# Patient Record
Sex: FEMALE | Race: WHITE | Employment: OTHER | URBAN - METROPOLITAN AREA
[De-identification: names, ages, dates, MRNs, and addresses within clinical notes are randomized per-mention and may not be internally consistent; named-entity substitution may affect disease eponyms.]

---

## 2018-03-15 ENCOUNTER — PROCEDURE VISIT (OUTPATIENT)
Dept: AUDIOLOGY | Age: 73
End: 2018-03-15

## 2018-03-15 DIAGNOSIS — H90.3 SENSORINEURAL HEARING LOSS, BILATERAL: Primary | ICD-10-CM

## 2018-03-15 PROCEDURE — 99024 POSTOP FOLLOW-UP VISIT: CPT | Performed by: AUDIOLOGIST

## 2018-03-15 NOTE — PROGRESS NOTES
Patient seen for hearing aid fitting. Patient was instructed in the use and care of her hearing aids. She was also instructed in the cleaning of the hearing aids. She will return in one week for a hearing aid check.

## 2018-03-23 ENCOUNTER — PROCEDURE VISIT (OUTPATIENT)
Dept: AUDIOLOGY | Age: 73
End: 2018-03-23

## 2018-03-23 DIAGNOSIS — H90.3 SENSORINEURAL HEARING LOSS, BILATERAL: Primary | ICD-10-CM

## 2018-03-23 PROCEDURE — 99024 POSTOP FOLLOW-UP VISIT: CPT | Performed by: AUDIOLOGIST

## 2018-04-26 ENCOUNTER — PROCEDURE VISIT (OUTPATIENT)
Dept: AUDIOLOGY | Age: 73
End: 2018-04-26

## 2018-04-26 DIAGNOSIS — H90.3 SENSORINEURAL HEARING LOSS, BILATERAL: Primary | ICD-10-CM

## 2018-04-26 PROCEDURE — 99024 POSTOP FOLLOW-UP VISIT: CPT | Performed by: AUDIOLOGIST

## 2018-05-01 ENCOUNTER — PROCEDURE VISIT (OUTPATIENT)
Dept: AUDIOLOGY | Age: 73
End: 2018-05-01

## 2018-05-01 DIAGNOSIS — H90.3 SENSORINEURAL HEARING LOSS, BILATERAL: Primary | ICD-10-CM

## 2018-05-01 PROCEDURE — 99024 POSTOP FOLLOW-UP VISIT: CPT | Performed by: AUDIOLOGIST

## 2020-08-13 ENCOUNTER — PROCEDURE VISIT (OUTPATIENT)
Dept: AUDIOLOGY | Age: 75
End: 2020-08-13

## 2020-08-13 PROCEDURE — 99999 PR OFFICE/OUTPT VISIT,PROCEDURE ONLY: CPT | Performed by: AUDIOLOGIST

## 2022-11-02 ENCOUNTER — APPOINTMENT (OUTPATIENT)
Dept: ULTRASOUND IMAGING | Age: 77
DRG: 177 | End: 2022-11-02
Payer: COMMERCIAL

## 2022-11-02 ENCOUNTER — APPOINTMENT (OUTPATIENT)
Dept: GENERAL RADIOLOGY | Age: 77
DRG: 177 | End: 2022-11-02
Payer: COMMERCIAL

## 2022-11-02 ENCOUNTER — HOSPITAL ENCOUNTER (INPATIENT)
Age: 77
LOS: 8 days | Discharge: NURSING, REHAB OR LONG-TERM CARE | DRG: 177 | End: 2022-11-10
Attending: EMERGENCY MEDICINE | Admitting: STUDENT IN AN ORGANIZED HEALTH CARE EDUCATION/TRAINING PROGRAM
Payer: COMMERCIAL

## 2022-11-02 ENCOUNTER — APPOINTMENT (OUTPATIENT)
Dept: CT IMAGING | Age: 77
DRG: 177 | End: 2022-11-02
Payer: COMMERCIAL

## 2022-11-02 DIAGNOSIS — J98.4 CAVITARY LESION OF LUNG: ICD-10-CM

## 2022-11-02 DIAGNOSIS — U07.1 COVID: ICD-10-CM

## 2022-11-02 DIAGNOSIS — R19.00 PELVIC MASS: ICD-10-CM

## 2022-11-02 DIAGNOSIS — U07.1 COVID-19 VIRUS INFECTION: ICD-10-CM

## 2022-11-02 DIAGNOSIS — J96.01 ACUTE RESPIRATORY FAILURE WITH HYPOXIA (HCC): Primary | ICD-10-CM

## 2022-11-02 PROBLEM — J96.00 ACUTE RESPIRATORY FAILURE (HCC): Status: ACTIVE | Noted: 2022-11-02

## 2022-11-02 LAB
ALBUMIN SERPL-MCNC: 3.2 G/DL (ref 3.5–5.2)
ALP BLD-CCNC: 52 U/L (ref 35–104)
ALT SERPL-CCNC: 26 U/L (ref 0–32)
ANION GAP SERPL CALCULATED.3IONS-SCNC: 12 MMOL/L (ref 7–16)
AST SERPL-CCNC: 69 U/L (ref 0–31)
BACTERIA: ABNORMAL /HPF
BASOPHILS ABSOLUTE: 0 E9/L (ref 0–0.2)
BASOPHILS RELATIVE PERCENT: 0.2 % (ref 0–2)
BILIRUB SERPL-MCNC: 0.5 MG/DL (ref 0–1.2)
BILIRUBIN URINE: NEGATIVE
BLOOD, URINE: ABNORMAL
BUN BLDV-MCNC: 21 MG/DL (ref 6–23)
BURR CELLS: ABNORMAL
C-REACTIVE PROTEIN: 10.8 MG/DL (ref 0–0.4)
CALCIUM SERPL-MCNC: 8 MG/DL (ref 8.6–10.2)
CHLORIDE BLD-SCNC: 99 MMOL/L (ref 98–107)
CLARITY: CLEAR
CO2: 21 MMOL/L (ref 22–29)
COLOR: YELLOW
CREAT SERPL-MCNC: 0.7 MG/DL (ref 0.5–1)
EKG ATRIAL RATE: 84 BPM
EKG P AXIS: 74 DEGREES
EKG P-R INTERVAL: 146 MS
EKG Q-T INTERVAL: 372 MS
EKG QRS DURATION: 94 MS
EKG QTC CALCULATION (BAZETT): 439 MS
EKG R AXIS: 73 DEGREES
EKG T AXIS: 65 DEGREES
EKG VENTRICULAR RATE: 84 BPM
EOSINOPHILS ABSOLUTE: 0 E9/L (ref 0.05–0.5)
EOSINOPHILS RELATIVE PERCENT: 0 % (ref 0–6)
GFR SERPL CREATININE-BSD FRML MDRD: >60 ML/MIN/1.73
GLUCOSE BLD-MCNC: 126 MG/DL (ref 74–99)
GLUCOSE URINE: NEGATIVE MG/DL
HCT VFR BLD CALC: 45.7 % (ref 34–48)
HEMOGLOBIN: 15.6 G/DL (ref 11.5–15.5)
INFLUENZA A BY PCR: NOT DETECTED
INFLUENZA B BY PCR: NOT DETECTED
KETONES, URINE: 15 MG/DL
LEUKOCYTE ESTERASE, URINE: ABNORMAL
LYMPHOCYTES ABSOLUTE: 0.29 E9/L (ref 1.5–4)
LYMPHOCYTES RELATIVE PERCENT: 2.6 % (ref 20–42)
MCH RBC QN AUTO: 29.7 PG (ref 26–35)
MCHC RBC AUTO-ENTMCNC: 34.1 % (ref 32–34.5)
MCV RBC AUTO: 87 FL (ref 80–99.9)
MONOCYTES ABSOLUTE: 0.49 E9/L (ref 0.1–0.95)
MONOCYTES RELATIVE PERCENT: 5.2 % (ref 2–12)
NEUTROPHILS ABSOLUTE: 9.02 E9/L (ref 1.8–7.3)
NEUTROPHILS RELATIVE PERCENT: 92.2 % (ref 43–80)
NITRITE, URINE: NEGATIVE
OVALOCYTES: ABNORMAL
PDW BLD-RTO: 14.8 FL (ref 11.5–15)
PH UA: 6 (ref 5–9)
PLATELET # BLD: 111 E9/L (ref 130–450)
PMV BLD AUTO: 11.5 FL (ref 7–12)
POIKILOCYTES: ABNORMAL
POTASSIUM REFLEX MAGNESIUM: 3.8 MMOL/L (ref 3.5–5)
PRO-BNP: 685 PG/ML (ref 0–450)
PROCALCITONIN: 0.2 NG/ML (ref 0–0.08)
PROTEIN UA: 30 MG/DL
RBC # BLD: 5.25 E12/L (ref 3.5–5.5)
RBC UA: ABNORMAL /HPF (ref 0–2)
REASON FOR REJECTION: NORMAL
REJECTED TEST: NORMAL
SARS-COV-2, NAAT: DETECTED
SEDIMENTATION RATE, ERYTHROCYTE: 6 MM/HR (ref 0–20)
SODIUM BLD-SCNC: 132 MMOL/L (ref 132–146)
SPECIFIC GRAVITY UA: >=1.03 (ref 1–1.03)
TOTAL PROTEIN: 6 G/DL (ref 6.4–8.3)
TROPONIN, HIGH SENSITIVITY: 13 NG/L (ref 0–9)
TROPONIN, HIGH SENSITIVITY: 14 NG/L (ref 0–9)
UROBILINOGEN, URINE: 1 E.U./DL
WBC # BLD: 9.8 E9/L (ref 4.5–11.5)
WBC UA: ABNORMAL /HPF (ref 0–5)

## 2022-11-02 PROCEDURE — 6370000000 HC RX 637 (ALT 250 FOR IP): Performed by: STUDENT IN AN ORGANIZED HEALTH CARE EDUCATION/TRAINING PROGRAM

## 2022-11-02 PROCEDURE — 87635 SARS-COV-2 COVID-19 AMP PRB: CPT

## 2022-11-02 PROCEDURE — 6360000002 HC RX W HCPCS: Performed by: STUDENT IN AN ORGANIZED HEALTH CARE EDUCATION/TRAINING PROGRAM

## 2022-11-02 PROCEDURE — 76856 US EXAM PELVIC COMPLETE: CPT

## 2022-11-02 PROCEDURE — 85025 COMPLETE CBC W/AUTO DIFF WBC: CPT

## 2022-11-02 PROCEDURE — 70450 CT HEAD/BRAIN W/O DYE: CPT

## 2022-11-02 PROCEDURE — 6360000004 HC RX CONTRAST MEDICATION: Performed by: RADIOLOGY

## 2022-11-02 PROCEDURE — 99285 EMERGENCY DEPT VISIT HI MDM: CPT

## 2022-11-02 PROCEDURE — 84145 PROCALCITONIN (PCT): CPT

## 2022-11-02 PROCEDURE — 2140000000 HC CCU INTERMEDIATE R&B

## 2022-11-02 PROCEDURE — 81001 URINALYSIS AUTO W/SCOPE: CPT

## 2022-11-02 PROCEDURE — 71275 CT ANGIOGRAPHY CHEST: CPT

## 2022-11-02 PROCEDURE — 74177 CT ABD & PELVIS W/CONTRAST: CPT

## 2022-11-02 PROCEDURE — 87502 INFLUENZA DNA AMP PROBE: CPT

## 2022-11-02 PROCEDURE — 73090 X-RAY EXAM OF FOREARM: CPT

## 2022-11-02 PROCEDURE — 90471 IMMUNIZATION ADMIN: CPT | Performed by: STUDENT IN AN ORGANIZED HEALTH CARE EDUCATION/TRAINING PROGRAM

## 2022-11-02 PROCEDURE — 71045 X-RAY EXAM CHEST 1 VIEW: CPT

## 2022-11-02 PROCEDURE — 72170 X-RAY EXAM OF PELVIS: CPT

## 2022-11-02 PROCEDURE — 72125 CT NECK SPINE W/O DYE: CPT

## 2022-11-02 PROCEDURE — 90714 TD VACC NO PRESV 7 YRS+ IM: CPT | Performed by: STUDENT IN AN ORGANIZED HEALTH CARE EDUCATION/TRAINING PROGRAM

## 2022-11-02 PROCEDURE — 2580000003 HC RX 258: Performed by: RADIOLOGY

## 2022-11-02 PROCEDURE — 93010 ELECTROCARDIOGRAM REPORT: CPT | Performed by: INTERNAL MEDICINE

## 2022-11-02 PROCEDURE — 96372 THER/PROPH/DIAG INJ SC/IM: CPT

## 2022-11-02 PROCEDURE — 84484 ASSAY OF TROPONIN QUANT: CPT

## 2022-11-02 PROCEDURE — 83880 ASSAY OF NATRIURETIC PEPTIDE: CPT

## 2022-11-02 PROCEDURE — 80053 COMPREHEN METABOLIC PANEL: CPT

## 2022-11-02 PROCEDURE — 93005 ELECTROCARDIOGRAM TRACING: CPT | Performed by: STUDENT IN AN ORGANIZED HEALTH CARE EDUCATION/TRAINING PROGRAM

## 2022-11-02 PROCEDURE — 94664 DEMO&/EVAL PT USE INHALER: CPT

## 2022-11-02 PROCEDURE — 85651 RBC SED RATE NONAUTOMATED: CPT

## 2022-11-02 PROCEDURE — 86140 C-REACTIVE PROTEIN: CPT

## 2022-11-02 RX ORDER — BUDESONIDE 0.25 MG/2ML
250 INHALANT ORAL 2 TIMES DAILY
Status: DISCONTINUED | OUTPATIENT
Start: 2022-11-02 | End: 2022-11-10 | Stop reason: HOSPADM

## 2022-11-02 RX ORDER — ONDANSETRON 2 MG/ML
4 INJECTION INTRAMUSCULAR; INTRAVENOUS EVERY 6 HOURS PRN
Status: DISCONTINUED | OUTPATIENT
Start: 2022-11-02 | End: 2022-11-10 | Stop reason: HOSPADM

## 2022-11-02 RX ORDER — IPRATROPIUM BROMIDE AND ALBUTEROL SULFATE 2.5; .5 MG/3ML; MG/3ML
3 SOLUTION RESPIRATORY (INHALATION) ONCE
Status: COMPLETED | OUTPATIENT
Start: 2022-11-02 | End: 2022-11-02

## 2022-11-02 RX ORDER — ENOXAPARIN SODIUM 100 MG/ML
40 INJECTION SUBCUTANEOUS 2 TIMES DAILY
Status: DISCONTINUED | OUTPATIENT
Start: 2022-11-02 | End: 2022-11-10 | Stop reason: HOSPADM

## 2022-11-02 RX ORDER — ARFORMOTEROL TARTRATE 15 UG/2ML
15 SOLUTION RESPIRATORY (INHALATION) 2 TIMES DAILY
Status: DISCONTINUED | OUTPATIENT
Start: 2022-11-02 | End: 2022-11-10 | Stop reason: HOSPADM

## 2022-11-02 RX ORDER — GUAIFENESIN/DEXTROMETHORPHAN 100-10MG/5
5 SYRUP ORAL EVERY 4 HOURS PRN
Status: DISCONTINUED | OUTPATIENT
Start: 2022-11-02 | End: 2022-11-10 | Stop reason: HOSPADM

## 2022-11-02 RX ORDER — SODIUM CHLORIDE 0.9 % (FLUSH) 0.9 %
5-40 SYRINGE (ML) INJECTION EVERY 12 HOURS SCHEDULED
Status: DISCONTINUED | OUTPATIENT
Start: 2022-11-02 | End: 2022-11-10 | Stop reason: HOSPADM

## 2022-11-02 RX ORDER — ACETAMINOPHEN 650 MG/1
650 SUPPOSITORY RECTAL EVERY 6 HOURS PRN
Status: DISCONTINUED | OUTPATIENT
Start: 2022-11-02 | End: 2022-11-03

## 2022-11-02 RX ORDER — ACETAMINOPHEN 325 MG/1
650 TABLET ORAL EVERY 6 HOURS PRN
Status: DISCONTINUED | OUTPATIENT
Start: 2022-11-02 | End: 2022-11-03

## 2022-11-02 RX ORDER — TETANUS AND DIPHTHERIA TOXOIDS ADSORBED 2; 2 [LF]/.5ML; [LF]/.5ML
0.5 INJECTION INTRAMUSCULAR ONCE
Status: COMPLETED | OUTPATIENT
Start: 2022-11-02 | End: 2022-11-02

## 2022-11-02 RX ORDER — ONDANSETRON 4 MG/1
4 TABLET, ORALLY DISINTEGRATING ORAL EVERY 8 HOURS PRN
Status: DISCONTINUED | OUTPATIENT
Start: 2022-11-02 | End: 2022-11-10 | Stop reason: HOSPADM

## 2022-11-02 RX ORDER — DEXAMETHASONE SODIUM PHOSPHATE 10 MG/ML
6 INJECTION INTRAMUSCULAR; INTRAVENOUS ONCE
Status: COMPLETED | OUTPATIENT
Start: 2022-11-02 | End: 2022-11-02

## 2022-11-02 RX ORDER — POLYETHYLENE GLYCOL 3350 17 G/17G
17 POWDER, FOR SOLUTION ORAL DAILY PRN
Status: DISCONTINUED | OUTPATIENT
Start: 2022-11-02 | End: 2022-11-10 | Stop reason: HOSPADM

## 2022-11-02 RX ORDER — DEXAMETHASONE 4 MG/1
6 TABLET ORAL DAILY
Status: DISCONTINUED | OUTPATIENT
Start: 2022-11-03 | End: 2022-11-03

## 2022-11-02 RX ORDER — SODIUM CHLORIDE 0.9 % (FLUSH) 0.9 %
5-40 SYRINGE (ML) INJECTION PRN
Status: DISCONTINUED | OUTPATIENT
Start: 2022-11-02 | End: 2022-11-10 | Stop reason: HOSPADM

## 2022-11-02 RX ORDER — SODIUM CHLORIDE 9 MG/ML
INJECTION, SOLUTION INTRAVENOUS PRN
Status: DISCONTINUED | OUTPATIENT
Start: 2022-11-02 | End: 2022-11-10 | Stop reason: HOSPADM

## 2022-11-02 RX ORDER — FUROSEMIDE 10 MG/ML
20 INJECTION INTRAMUSCULAR; INTRAVENOUS 2 TIMES DAILY
Status: DISCONTINUED | OUTPATIENT
Start: 2022-11-03 | End: 2022-11-05

## 2022-11-02 RX ORDER — FUROSEMIDE 10 MG/ML
20 INJECTION INTRAMUSCULAR; INTRAVENOUS ONCE
Status: COMPLETED | OUTPATIENT
Start: 2022-11-02 | End: 2022-11-02

## 2022-11-02 RX ORDER — SODIUM CHLORIDE 0.9 % (FLUSH) 0.9 %
10 SYRINGE (ML) INJECTION ONCE
Status: COMPLETED | OUTPATIENT
Start: 2022-11-02 | End: 2022-11-02

## 2022-11-02 RX ADMIN — DEXAMETHASONE SODIUM PHOSPHATE 6 MG: 10 INJECTION INTRAMUSCULAR; INTRAVENOUS at 18:11

## 2022-11-02 RX ADMIN — FUROSEMIDE 20 MG: 10 INJECTION, SOLUTION INTRAMUSCULAR; INTRAVENOUS at 13:09

## 2022-11-02 RX ADMIN — BUDESONIDE 250 MCG: 0.25 SUSPENSION RESPIRATORY (INHALATION) at 20:35

## 2022-11-02 RX ADMIN — IOPAMIDOL 90 ML: 755 INJECTION, SOLUTION INTRAVENOUS at 11:31

## 2022-11-02 RX ADMIN — ARFORMOTEROL TARTRATE 15 MCG: 15 SOLUTION RESPIRATORY (INHALATION) at 20:35

## 2022-11-02 RX ADMIN — IPRATROPIUM BROMIDE AND ALBUTEROL SULFATE 3 AMPULE: .5; 3 SOLUTION RESPIRATORY (INHALATION) at 10:02

## 2022-11-02 RX ADMIN — TETANUS AND DIPHTHERIA TOXOIDS ADSORBED 0.5 ML: 2; 2 INJECTION INTRAMUSCULAR at 12:15

## 2022-11-02 RX ADMIN — Medication 10 ML: at 11:31

## 2022-11-02 RX ADMIN — ENOXAPARIN SODIUM 40 MG: 100 INJECTION SUBCUTANEOUS at 21:17

## 2022-11-02 ASSESSMENT — ENCOUNTER SYMPTOMS
CHEST TIGHTNESS: 0
SHORTNESS OF BREATH: 0
BACK PAIN: 0
DIARRHEA: 0
SORE THROAT: 0
ABDOMINAL PAIN: 0
ABDOMINAL DISTENTION: 0
NAUSEA: 0
VOMITING: 0
COUGH: 0

## 2022-11-02 ASSESSMENT — PAIN - FUNCTIONAL ASSESSMENT: PAIN_FUNCTIONAL_ASSESSMENT: NONE - DENIES PAIN

## 2022-11-02 NOTE — LETTER
[address] Medicaid  CERTIFICATION OF NECESSITY  FOR TRANSPORTATION   BY WHEELCHAIR VAN     Individual Information   1. Name: Susi Gisela 2. [place] Medicaid Billing Number:    3. Address: [facility] [address] [address]      Transportation Provider Information   4. Provider Name:    5. [place] Medicaid Provider Number:  National Provider Identifier (NPI):      Certification  7. Criteria:  By signing this document, the practitioner certifies that two statements are true:  A. This individual must be accompanied by a mobility-related assistive device from the point of pick-up to the point of drop-off. B. Transport of this individual by standard passenger vehicle or common carrier is precluded or contraindicated. 8. Period Beginning Date: 11/10/22   9. Length   [x] Not more than 1 day(s)  [] One Year     Additional Information Relevant to Certification   10. Comments or Explanations, If Necessary or Appropriate   Acute respiratory failure ([address] Pelvic mass, Cavitary lesion of lung, Acute respiratory failure with hypoxia ([address] COVID, COVID-19 virus infection, on [facility]     Certifying Practitioner Information   11. Name of Practitioner: Chen Hewitt MD   12. [place] Medicaid Provider Number, If Applicable:  [address] Provider Identifier (NPI):      Signature Information   14. Date of Signature: 11/10/22 15. Name of Person Signing: Anil Soler   12. Signature and Professional Designation: Electronically signed by HEMALATHA Gutierres on 11/10/2022 at 8:36 AM     OD 54408  Rev. 7/2015  [facility] Encounter Date/Time: 11/2/2022 [address] Account: [de-identified]    MRN: [id number]    Patient: Susi Gisela    Contact Serial #: [id number]      ENCOUNTER          Patient Class: I Private Enc?   [facility]   Hospital Service: MED   Encounter DX: Acute respiratory failur*   ADM Provider:     Procedure:     ATT Provider: Chen Hewitt MD   REF Provider:    Admission DX: Acute respiratory failure ([facility] Pelvic mass, Cavitary lesion of lung, Acute respiratory failure with hypoxia ([facility] [address] [facility] COVID, COVID-19 virus infection and DX codes: J96.00, R19.00, J98.4, J96.01, U07.1, U07.1      PATIENT                 Name: Ivett Flores : 1945 (77 yrs)   Address: [place] [address] Sex: Female   City: [place] [address]         Marital Status:    Employer: RETIRED         [employer]: [employer]   Primary Care Provider:           Primary Phone: [phone]   EMERGENCY CONTACT   Contact Name Legal Guardian? Relationship to Patient Home Phone Work Phone   1. John Tavarez  2. aJda Lew No    Grandchild    [phone]              GUARANTOR            Guarantor: Ivett Flores     : 1945   Address: [address] Sex: Female     [place] [address]     Relation to Patient: Self       Home Phone: [phone]   Guarantor ID: [id number]       Work Phone:     Guarantor Employer: RETIRED         Status: RETIRED      COVERAGE        PRIMARY INSURANCE   Payor: Kim Monroe* Plan: Harlan MICHAEL*   Payor Address: [facility]  [facility], [facility]       Group Number[id number] Type: INDEMNITY   Subscriber Name: Winter Dietrich : 1945   Subscriber ID: [id number] Pat. Rel. to Sub: Self   SECONDARY INSURANCE   Payor:   Plan:     Payor Address:  ,           Group Number:   Insurance Type:     Subscriber Name:   Subscriber :     Subscriber ID:   Pat.  Rel. to Sub:           CSN: [id number]

## 2022-11-02 NOTE — ED NOTES
Patient from home via EMS, frequent falls x 1 week, fell 4 days ago and she hit her head on the dresser, laceration lt side forehead and skin tear left forearm. Diarrhea x 4 days. Denies SOB and CP. Spo2 low 80s RA. Placed on 5L NC.       Odalis De RN  11/02/22 3235

## 2022-11-02 NOTE — PROGRESS NOTES
Called er department 2x's to request pt drink for their ultrasound. No one answered the phone. Eugenia Jorgensen  keep trying.

## 2022-11-02 NOTE — ED PROVIDER NOTES
ATTENDING PROVIDER ATTESTATION:     Christopher Jiang presented to the emergency department for evaluation of Fall (Dizziness with several falls over last week, diarrhea, hypoxic, denies sob)   and was initially evaluated by the Medical Resident. See Original ED Note for H&P and ED course above. I have reviewed and discussed the case, including pertinent history (medical, surgical, family and social) and exam findings with the Medical Resident assigned to Christopher Jiang. I have personally performed and/or participated in the history, exam, medical decision making, and procedures and agree with all pertinent clinical information and any additional changes or corrections are noted below in my assessment and plan. I have discussed this patient in detail with the resident, and provided the instruction and education,       I have reviewed my findings and recommendations with the assigned Medical Resident, Christopher Jiang and members of family present at the time of disposition. I have performed a history and physical examination of this patient and directly supervised the resident caring for this patient      History of Present Illness:    Presents to the ED for frequent falls, beginning a few days ago. The complaint has been constant, moderate in severity, and worsened by nothing. Frequent falls for the last 4 days. Getting worse. Confused. Hypoxemic on arrival. She says she fell again today. No fever or chills. No chest pain. No back pain. No neck pain. Review of Systems:   A complete review of systems was performed and pertinent positives and negatives are stated within HPI, all other systems reviewed and are negative.    --------------------------------------------- PAST HISTORY ---------------------------------------------  Past Medical History:  has a past medical history of Fluid retention in legs.     Past Surgical History:  has a past surgical history that includes Tubal ligation; Tonsillectomy; Dental surgery; and Tubal ligation. Social History:  reports that she has been smoking. She has a 17.50 pack-year smoking history. She has never used smokeless tobacco. She reports that she does not drink alcohol and does not use drugs. Family History: family history is not on file. Unless otherwise noted, family history is non contributory    The patients home medications have been reviewed. Allergies: Patient has no known allergies. EKG Interpretation  Interpreted by emergency department physician, Dr. Evette Ortiz     11/2/22  Time: 2920    Rhythm: normal sinus   Rate: normal  Axis: normal  Conduction: normal  ST Segments: no acute change  T Waves: no acute change    Clinical Impression: Sinus rhythm, no acute ischemic changes  Comparison to Old EKG  no old EKG        Physical Exam:  Constitutional/General: Alert and oriented x3  Head: Normocephalic and atraumatic  Eyes: PERRL, EOMI, sclera non icteric  ENT: Oropharynx clear, handling secretions  Neck: Supple, full ROM, no stridor, no meningeal signs  Respiratory: Lungs with rales bilaterally. Decreased BS bilaterally. Not in respiratory distress  Cardiovascular:  Regular rate. Regular rhythm. No murmurs, no gallops, no rubs. 2+ distal pulses. Equal extremity pulses. GI:  Abdomen Soft, Non tender, Non distended. No rebound, guarding, or rigidity. No pulsatile masses. Musculoskeletal: Moves all extremities x 4. Warm and well perfused,  no clubbing, no cyanosis, no edema. Palpable peripheral pulses  Integument: skin warm and dry. Multiple healing bruises   Neurologic: GCS 15, no focal deficits  Psychiatric: Normal Affect      I directly supervised any procedures performed by the resident and was present for the procedure including all critical portions of the procedure      The cardiac monitor revealed sinus rhythm with a heart rate in the 70s as interpreted by me.  The cardiac monitor was ordered secondary to the patient's hypoxemia and to monitor the patient for dysrhythmia. CPT [id number]        Oxygen Saturation Interpretation: 87 % on RA. I, Dr. Winter Sadler, am the primary provider of record      Medical Decision [address] infection  Hypoxemic  Needs admission  Not in distress      Name and Route of medications administered in the ED:  Medications   sodium chloride flush 0.9 % injection 5-40 mL (has no administration in time range)   sodium chloride flush 0.9 % injection 5-40 mL (has no administration in time range)   0.9 % sodium chloride infusion (has no administration in time range)   enoxaparin (LOVENOX) injection 40 mg (has no administration in time range)   ondansetron (ZOFRAN-ODT) disintegrating tablet 4 mg (has no administration in time range)     Or   ondansetron (ZOFRAN) injection 4 mg (has no administration in time range)   polyethylene glycol (GLYCOLAX) packet 17 g (has no administration in time range)   acetaminophen (TYLENOL) tablet 650 mg (has no administration in time range)     Or   acetaminophen (TYLENOL) suppository 650 mg (has no administration in time range)   guaiFENesin-dextromethorphan (ROBITUSSIN DM) 100-10 MG/5ML syrup 5 mL (has no administration in time range)   dexamethasone (DECADRON) injection 6 mg (has no administration in time range)   ipratropium-albuterol (DUONEB) nebulizer solution 3 ampule (3 ampules Inhalation Given 22 1002)   diptheria-tetanus toxoids (DECAVAC) 2-2 LF/0.5ML injection 0.5 mL (0.5 mLs IntraMUSCular Given 22 1215)   sodium chloride flush 0.9 % injection 10 mL (10 mLs IntraVENous Given 22 1131)   iopamidol (ISOVUE-370) 76 % injection 90 mL (90 mLs IntraVENous Given 22 1131)   furosemide (LASIX) injection 20 mg (20 mg IntraVENous Given 22 1309)               Re-Evaluations:  ED Course as of 22 1606   Wed 2022   1033 Patient had monitor for headache urinary normal saline flushes.  [JM]   0421 CT Head WO Contrast [JM]      ED Course User Index  [JM] Ruperto Schneider MD             This patient's ED course included: a personal history and physicial examination, re-evaluation prior to disposition, multiple bedside re-evaluations, IV medications, cardiac monitoring, continuous pulse oximetry, and complex medical decision making and emergency management    This patient has remained hemodynamically stable during their ED course. Consultations:  [facility]        1. Acute respiratory failure with hypoxia (HCC)    2. Pelvic mass    3. Cavitary lesion of lung    4.  COVID    5. COVID-19 virus infection            Logan Humphrey MD  11/02/22 7410

## 2022-11-02 NOTE — ED PROVIDER NOTES
HPI     Patient is a 78 y.o. female presents with a chief complaint of frequents falls  This has been occurring for 5 days. Patient states that it gets better with nothing. Patient states that it gets worse with nothing. Patient states that it is moderate in severity. Patient states it was acute in onset. Patient presents with multiple falls. Patient states that she has no specific complaints at this time. Patient denies any chest pain or shortness of breath. Denies any fevers or chills. Patient is unable to articulate why she fell. Patient denies any changes in urinary or bowel habits. Patient did state that she fell and hit her head on her dresser approximately 5 days ago. Review of Systems   Constitutional:  Negative for activity change, appetite change, chills, fatigue and fever. HENT:  Negative for congestion, drooling and sore throat. Respiratory:  Negative for cough, chest tightness and shortness of breath. Cardiovascular:  Negative for chest pain and palpitations. Gastrointestinal:  Negative for abdominal distention, abdominal pain, diarrhea, nausea and vomiting. Genitourinary:  Negative for decreased urine volume, difficulty urinating, enuresis, flank pain, frequency and hematuria. Musculoskeletal:  Negative for arthralgias, back pain and neck stiffness. Skin:  Positive for wound. Negative for rash. Neurological:  Negative for dizziness, facial asymmetry, light-headedness and headaches. Psychiatric/Behavioral:  Negative for agitation, confusion and decreased concentration. Physical Exam  Vitals and nursing note reviewed. Constitutional:       Appearance: She is well-developed. HENT:      Head: Normocephalic. Mouth/Throat:      Pharynx: No oropharyngeal exudate or posterior oropharyngeal erythema. Eyes:      Extraocular Movements: Extraocular movements intact.       Conjunctiva/sclera: Conjunctivae normal.      Pupils: Pupils are equal, round, and reactive to light. Cardiovascular:      Rate and Rhythm: Normal rate and regular rhythm. Heart sounds: Normal heart sounds. No murmur heard. Pulmonary:      Effort: Pulmonary effort is normal. No respiratory distress. Breath sounds: Wheezing present. No rales. Comments: End expiratory wheezes bilaterally. Abdominal:      General: Bowel sounds are normal.      Palpations: Abdomen is soft. Tenderness: There is no abdominal tenderness. There is no guarding or rebound. Musculoskeletal:      Cervical back: Normal range of motion and neck supple. Skin:     General: Skin is warm and dry. Comments: Laceration over the left thigh. Skin abrasion over the left forearm. No active bleeding. Laceration appears old with granulation tissue around the edges of the wound. Neurological:      Mental Status: She is alert and oriented to person, place, and time. Cranial Nerves: No cranial nerve deficit. Coordination: Coordination normal.      Comments: Alert and oriented. Slow to answer questions. Moving all 4 extremities. No focal neurological deficits. Procedures     MDM       ED Course as of 11/02/22 1245   Wed Nov 02, 2022   1033 Patient had monitor for headache urinary normal saline flushes. [JM]   1246 CT Head WO Contrast [JM]      ED Course User Index  [JM] Brian Johnson MD      Patient is a 78 y.o. female presenting with frequent falls. Patient was quiring 5 L of oxygen. Patient was initially hypoxic. Patient admitted chest x-ray. CT of the chest was also ordered to rule out pulmonary embolism. CT of the chest was concerning for pneumonia versus fluid overload. Patient was given a low-dose of Lasix. Patient did have COVID on test.  Patient was given Decadron. Patient was hemodynamically stable. Patient CT scan of the abdomen showed a mass. Patient was then admitted to internal medicine. Discussed CT results with internal medicine.   Patient was able to admitted to telemetry. Patient was seen and evaluated by myself and my attending Clara Velázquez MD. Assessment and Plan discussed with attending provider, please see attestation for final plan of care. This note was done using dictation software and there may be some grammatical errors associated with this. Kavitha Kahn MD    ED Course as of 11/02/22 1708   Wed Nov 02, 2022   1033 Patient had monitor for headache urinary normal saline flushes. [MANSI]   4354 CT Head WO Contrast []      ED Course User Index  [MANSI] Kavitha Kahn MD       --------------------------------------------- PAST HISTORY ---------------------------------------------  Past Medical History:  has a past medical history of Fluid retention in legs. Past Surgical History:  has a past surgical history that includes Tubal ligation; Tonsillectomy; Dental surgery; and Tubal ligation. Social History:  reports that she has been smoking. She has a 17.50 pack-year smoking history. She has never used smokeless tobacco. She reports that she does not drink alcohol and does not use drugs. Family History: family history is not on file. The patients home medications have been reviewed. Allergies: Patient has no known allergies.     -------------------------------------------------- RESULTS -------------------------------------------------    LABS:  Results for orders placed or performed during the hospital encounter of 11/02/22   COVID-19, Rapid    Specimen: Nasopharyngeal Swab   Result Value Ref Range    SARS-CoV-2, NAAT DETECTED (A) Not Detected   RAPID INFLUENZA A/B ANTIGENS    Specimen: Nasopharyngeal   Result Value Ref Range    Influenza A by PCR Not Detected Not Detected    Influenza B by PCR Not Detected Not Detected   CBC with Auto Differential   Result Value Ref Range    WBC 9.8 4.5 - 11.5 E9/L    RBC 5.25 3.50 - 5.50 E12/L    Hemoglobin 15.6 (H) 11.5 - 15.5 g/dL    Hematocrit 45.7 34.0 - 48.0 %    MCV 87.0 80.0 - 99.9 fL    MCH 29.7 26.0 - 35.0 pg    MCHC 34.1 32.0 - 34.5 %    RDW 14.8 11.5 - 15.0 fL    Platelets 723 (L) 844 - 450 E9/L    MPV 11.5 7.0 - 12.0 fL    Neutrophils % 92.2 (H) 43.0 - 80.0 %    Lymphocytes % 2.6 (L) 20.0 - 42.0 %    Monocytes % 5.2 2.0 - 12.0 %    Eosinophils % 0.0 0.0 - 6.0 %    Basophils % 0.2 0.0 - 2.0 %    Neutrophils Absolute 9.02 (H) 1.80 - 7.30 E9/L    Lymphocytes Absolute 0.29 (L) 1.50 - 4.00 E9/L    Monocytes Absolute 0.49 0.10 - 0.95 E9/L    Eosinophils Absolute 0.00 (L) 0.05 - 0.50 E9/L    Basophils Absolute 0.00 0.00 - 0.20 E9/L    Poikilocytes 1+     Reginaldo Cells 1+     Ovalocytes 1+    Comprehensive Metabolic Panel w/ Reflex to MG   Result Value Ref Range    Sodium 132 132 - 146 mmol/L    Potassium reflex Magnesium 3.8 3.5 - 5.0 mmol/L    Chloride 99 98 - 107 mmol/L    CO2 21 (L) 22 - 29 mmol/L    Anion Gap 12 7 - 16 mmol/L    Glucose 126 (H) 74 - 99 mg/dL    BUN 21 6 - 23 mg/dL    Creatinine 0.7 0.5 - 1.0 mg/dL    Est, Glom Filt Rate >60 >=60 mL/min/1.73    Calcium 8.0 (L) 8.6 - 10.2 mg/dL    Total Protein 6.0 (L) 6.4 - 8.3 g/dL    Albumin 3.2 (L) 3.5 - 5.2 g/dL    Total Bilirubin 0.5 0.0 - 1.2 mg/dL    Alkaline Phosphatase 52 35 - 104 U/L    ALT 26 0 - 32 U/L    AST 69 (H) 0 - 31 U/L   Troponin   Result Value Ref Range    Troponin, High Sensitivity 14 (H) 0 - 9 ng/L   Brain Natriuretic Peptide   Result Value Ref Range    Pro- (H) 0 - 450 pg/mL   Urinalysis with Microscopic   Result Value Ref Range    Color, UA Yellow Straw/Yellow    Clarity, UA Clear Clear    Glucose, Ur Negative Negative mg/dL    Bilirubin Urine Negative Negative    Ketones, Urine 15 (A) Negative mg/dL    Specific Gravity, UA >=1.030 1.005 - 1.030    Blood, Urine SMALL (A) Negative    pH, UA 6.0 5.0 - 9.0    Protein, UA 30 (A) Negative mg/dL    Urobilinogen, Urine 1.0 <2.0 E.U./dL    Nitrite, Urine Negative Negative    Leukocyte Esterase, Urine SMALL (A) Negative    WBC, UA 2-5 0 - 5 /HPF    RBC, UA 2-5 0 - 2 /HPF    Bacteria, UA FEW (A) None Seen /HPF   SPECIMEN REJECTION   Result Value Ref Range    Rejected Test DIMER     Reason for Rejection see below    Troponin   Result Value Ref Range    Troponin, High Sensitivity 13 (H) 0 - 9 ng/L   EKG 12 Lead   Result Value Ref Range    Ventricular Rate 84 BPM    Atrial Rate 84 BPM    P-R Interval 146 ms    QRS Duration 94 ms    Q-T Interval 372 ms    QTc Calculation (Bazett) 439 ms    P Axis 74 degrees    R Axis 73 degrees    T Axis 65 degrees       RADIOLOGY:  CT Head WO Contrast   Final Result   1. No acute intracranial abnormality. 2. Chronic small vessel ischemic disease. CT Cervical Spine WO Contrast   Final Result   1. There is no acute compression fracture or subluxation of the cervical   spine. 2. Multilevel degenerative disc and degenerative joint disease. CTA PULMONARY W CONTRAST   Final Result   1. No PE.      2.  Bilateral widespread airspace opacities compatible pulmonary edema, or   less likely pneumonia. Left atrial enlargement. 3.  Mediastinal and bilateral hilar lymph node enlargement, nonspecific. RECOMMENDATIONS:   Unavailable         CT ABDOMEN PELVIS W IV CONTRAST Additional Contrast? None   Final Result   Enhancing area visualized within the endometrial cavity with surrounding low   attenuation worrisome for a mass would recommend further evaluation with   transvaginal pelvic ultrasound. Focal aneurysmal dilatation of the distal abdominal aorta and common iliac   artery. Abdominal aorta maximal transverse diameters of 3.5 x 3.5 cm. Nodularity visualized in the left adrenal gland, differential diagnosis would   include adrenal adenoma, adrenal hyperplasia less likely adrenal hemorrhage. Extensive degenerative bone changes are seen. Small type 1 hiatus hernia. 3.8 cm cavitary lesion visualized in the inferolateral left lower lobe. Prominent chronic interstitial and bronchovascular lung changes is seen.          XR RADIUS ULNA LEFT (2 VIEWS)   Final Result   No acute osseous abnormality. XR PELVIS (1-2 VIEWS)   Final Result   No acute abnormality of the pelvis. XR CHEST PORTABLE   Final Result   Mild bibasilar infiltrates slightly worse on the left. US NON OB TRANSVAGINAL    (Results Pending)           ------------------------- NURSING NOTES AND VITALS REVIEWED ---------------------------  Date / Time Roomed:  11/2/2022  9:38 AM  ED Bed Assignment:  24/24    The nursing notes within the ED encounter and vital signs as below have been reviewed. Patient Vitals for the past 24 hrs:   BP Temp Pulse Resp SpO2 Weight   11/02/22 1644 (!) 103/54 -- 77 20 93 % --   11/02/22 1240 -- -- -- 20 94 % --   11/02/22 1213 (!) 113/56 -- 76 20 92 % --   11/02/22 1047 102/60 -- 78 16 91 % --   11/02/22 0930 (!) 101/57 98.9 °F (37.2 °C) 90 18 (!) 80 % 235 lb (106.6 kg)       Oxygen Saturation Interpretation: Abnormal and Improved after treatment    ------------------------------------------ PROGRESS NOTES ------------------------------------------  Re-evaluation(s):   Patients symptoms are improving  Repeat physical examination is improved    Counseling:  I have spoken with the patient and discussed todays results, in addition to providing specific details for the plan of care and counseling regarding the diagnosis and prognosis. Their questions are answered at this time and they are agreeable with the plan of admission.    --------------------------------- ADDITIONAL PROVIDER NOTES ---------------------------------  Consultations:  Spoke with Dr. Alla Guerra. Discussed case. They will admit the patient. This patient's ED course included: a personal history and physicial examination, re-evaluation prior to disposition, multiple bedside re-evaluations, IV medications, cardiac monitoring, and continuous pulse oximetry    This patient has remained hemodynamically stable during their ED course. Diagnosis:  1.  Acute respiratory failure with hypoxia (HCC)    2. Pelvic mass    3. Cavitary lesion of lung    4.  COVID    5. COVID-19 virus infection        Disposition:  Patient's disposition: Admit to telemetry  Patient's condition is Stable           Lisa Escobar MD  Resident  11/02/22 7705

## 2022-11-02 NOTE — H&P
Hospitalist History & Physical      PCP: No primary care provider on file. Date of Admission: 11/2/2022    Date of Service: Pt seen/examined on 11/2/2022 and is admitted to Inpatient with expected LOS greater than two midnights due to medical therapy. Chief Complaint:  had concerns including Fall (Dizziness with several falls over last week, diarrhea, hypoxic, denies sob). History Of Present Illness:    Ms. Joseline Bueno, a 78y.o. year old female  who  has a past medical history of Fluid retention in legs. Patient presented to the emergency department for evaluation of frequent falls for about a week. Patient has been feeling weak, she fell 4 days ago and hit her head with the dresser and has a laceration on the left-sided forehead and on the left arm. Patient is chronic smoker. She has no PCP and has not been evaluated by a doctor for long time. Patient is hard of hearing and her son is at bedside assisting with communication. Also talked to her niece over the phone who is in the process to her the POA. Patient denies sob at home, fevers, chills, chest pain, abdominal pain. She urinates frequently. Her niece does not report other issues aside for the falls. ED has documented diarrhea for 4 days. Upon ED arrival patient had significant respiratory distress. Spo2 low 80s RA. Placed on 5L NC.   VS T 98.9 /57 HR 90 RR 18 Sat 80% improved to 92-93 % on %L/min  Labs: Positive COVID-19. Negative influenza. Wbc 9.8 Hb 15.6/ Ht 45.7 CMP NAGMA, bicarb 21, BUN/Cr 21/0.7 proBNP 685  Trop 14>> 13     Imaging: CTA negative for PE. Bilateral widespread airspace opacities compatible pulmonary edema, or less likely pneumonia. Left atrial enlargement.  3. Mediastinal and bilateral hilar lymph node enlargement  CT abdomen and pelvis: Enhancing area visualized within the endometrial cavity with surrounding low attenuation worrisome for a mass would recommend further evaluation with transvaginal pelvic ultrasound. Focal aneurysmal dilation up to 3.5 cm. Possible adrenal adenoma, adrenal hyperplasia less likely adrenal hemorrhage  CT head: no acute intracranial process. CT cervical spine and no acute compression fraction of subluxation. X-ray radius ulna, left no acute osseous abnormality. X-ray pelvis no abnormality  Bedside bladder ultrasound performed by me. The bladder appears distended. Patient had a external female catheter that is apparently not working  Spoke to bedside nurse and asked to give her bedside plan or start a Ball if no urine output    Received: Tetanus vaccine, DuoNebs, Lasix 20 mg once    Patient will be admitted for further management or acute hypoxic respiratory failure, COVID-19 infection, possible heart failure       Past Medical History:   Diagnosis Date    Fluid retention in legs        Past Surgical History:   Procedure Laterality Date    DENTAL SURGERY      full mouth with dentures    TONSILLECTOMY      age 13     TUBAL LIGATION      45 years ago     TUBAL LIGATION         Prior to Admission medications    Not on File         Allergies:  Patient has no known allergies. Social History:    TOBACCO:   reports that she has been smoking. She has a 17.50 pack-year smoking history. She has never used smokeless tobacco.  ETOH:   reports no history of alcohol use. Family History:    Reviewed in detail and negative for DM, CAD, Cancer, CVA. Positive as follows\"  History reviewed. No pertinent family history. REVIEW OF SYSTEMS:   Pertinent positives as noted in the HPI. All other systems reviewed and negative. PHYSICAL EXAM:  BP (!) 113/56   Pulse 76   Temp 98.9 °F (37.2 °C)   Resp 20   Wt 235 lb (106.6 kg)   SpO2 94%   BMI 36.81 kg/m²   General appearance: No apparent distress, appears stated age and cooperative. HEENT: Normal cephalic, without obvious deformity. Left-sided forehead laceration , pupils equal, round, and reactive to light.   Extra ocular muscles intact. Conjunctivae/corneas clear. Neck: Supple, with full range of motion. No jugular venous distention. Trachea midline. Respiratory: Moist cough, on 5 L/min nasal cannula, increased respiratory effort, bilateral crackles up to 2/3   Cardiovascular: RRR, no MGR   Abdomen: globular, soft, scar in lower abdomen (old), suprapubic fullness and tenderness. No other tender areas. No rebound tenderness  Musculoskeletal: No clubbing, cyanosis, 1+ edema of bilateral lower extremities. Brisk capillary refill. Skin: Normal skin color. No rashes or lesions. Neurologic:  Neurovascularly intact without any focal sensory/motor deficits. Cranial nerves: II-XII intact, grossly non-focal.  Except for hard of hearing. All extremities  Psychiatric: Alert and oriented, thought content appropriate, normal insight    Reviewed EKG and CXR personally      CBC:   Recent Labs     11/02/22  1012   WBC 9.8   RBC 5.25   HGB 15.6*   HCT 45.7   MCV 87.0   RDW 14.8   *     BMP:   Recent Labs     11/02/22  1012      K 3.8   CL 99   CO2 21*   BUN 21   CREATININE 0.7     LFT:  Recent Labs     11/02/22  1012   PROT 6.0*   ALKPHOS 52   ALT 26   AST 69*   BILITOT 0.5     CE:  No results for input(s): Otjanessaia Husseinley in the last 72 hours. PT/INR: No results for input(s): INR, APTT in the last 72 hours. BNP: No results for input(s): BNP in the last 72 hours.   ESR: No results found for: SEDRATE  CRP: No results found for: CRP  D Dimer: No results found for: DDIMER   Folate and B12: No results found for: TEXHMGNA13, No results found for: FOLATE  Lactic Acid: No results found for: LACTA  Thyroid Studies:   Lab Results   Component Value Date    TSH 2.040 09/13/2017    U4UUMWM 7.5 09/13/2017       Oupatient labs:  Lab Results   Component Value Date    CHOL 230 (H) 09/13/2017    TRIG 273 (H) 09/13/2017    HDL 37 09/13/2017    LDLCALC 138 (H) 09/13/2017    TSH 2.040 09/13/2017       Urinalysis:    Lab Results   Component Value Date/Time    NITRU Negative 11/02/2022 10:12 AM    WBCUA 2-5 11/02/2022 10:12 AM    BACTERIA FEW 11/02/2022 10:12 AM    RBCUA 2-5 11/02/2022 10:12 AM    BLOODU SMALL 11/02/2022 10:12 AM    SPECGRAV >=1.030 11/02/2022 10:12 AM    GLUCOSEU Negative 11/02/2022 10:12 AM       Imaging:  XR PELVIS (1-2 VIEWS)    Result Date: 11/2/2022  EXAMINATION: ONE XRAY VIEW OF THE PELVIS 11/2/2022 11:15 am COMPARISON: None. HISTORY: ORDERING SYSTEM PROVIDED HISTORY: frequent falls TECHNOLOGIST PROVIDED HISTORY: Reason for exam:->frequent falls What reading provider will be dictating this exam?->CRC FINDINGS: No evidence of pelvic fracture. Bilateral hips demonstrate normal alignment. No focal osseous lesion. SI joints are symmetric. No acute abnormality of the pelvis. XR RADIUS ULNA LEFT (2 VIEWS)    Result Date: 11/2/2022  EXAMINATION: TWO XRAY VIEWS OF THE LEFT FOREARM 11/2/2022 11:16 am COMPARISON: None. HISTORY: ORDERING SYSTEM PROVIDED HISTORY: fall hit arm TECHNOLOGIST PROVIDED HISTORY: Reason for exam:->fall hit arm What reading provider will be dictating this exam?->CRC FINDINGS: There is no evidence of acute fracture. There is normal alignment. No acute joint abnormality. No focal osseous lesion. No focal soft tissue abnormality. No acute osseous abnormality. CT Head WO Contrast    Result Date: 11/2/2022  EXAMINATION: CT OF THE HEAD WITHOUT CONTRAST  11/2/2022 11:31 am TECHNIQUE: CT of the head was performed without the administration of intravenous contrast. Automated exposure control, iterative reconstruction, and/or weight based adjustment of the mA/kV was utilized to reduce the radiation dose to as low as reasonably achievable. COMPARISON: None. HISTORY: ORDERING SYSTEM PROVIDED HISTORY: fall, hit head TECHNOLOGIST PROVIDED HISTORY: Reason for exam:->fall, hit head Has a \"code stroke\" or \"stroke alert\" been called? ->No Decision Support Exception - unselect if not a suspected or confirmed emergency medical condition->Emergency Medical Condition (MA) What reading provider will be dictating this exam?->CRC FINDINGS: There is patchy hypoattenuation within the white matter of the bilateral cerebral hemispheres. There is a hypodensity at the left lentiform nucleus. There is no evidence of mass, mass effect, or midline shift. The ventricles are normal size and configuration. There is mild enlargement of the cerebral sulci. No extra-axial fluid collections or acute hemorrhage. The gray-white differentiation appears preserved without evidence of acute cortical ischemia. The calvarium is intact. There is scattered mucosal thickening within the bilateral ethmoid and left frontal sinuses. There are bilateral mastoid effusions. 1. No acute intracranial abnormality. 2. Chronic small vessel ischemic disease. CT Cervical Spine WO Contrast    Result Date: 11/2/2022  EXAMINATION: CT OF THE CERVICAL SPINE WITHOUT CONTRAST 11/2/2022 11:31 am TECHNIQUE: CT of the cervical spine was performed without the administration of intravenous contrast. Multiplanar reformatted images are provided for review. Automated exposure control, iterative reconstruction, and/or weight based adjustment of the mA/kV was utilized to reduce the radiation dose to as low as reasonably achievable. COMPARISON: None. HISTORY: ORDERING SYSTEM PROVIDED HISTORY: fall hit head TECHNOLOGIST PROVIDED HISTORY: Reason for exam:->fall hit head Decision Support Exception - unselect if not a suspected or confirmed emergency medical condition->Emergency Medical Condition (MA) What reading provider will be dictating this exam?->CRC FINDINGS: The ring of C1 is intact as is the dense. There is no compression fracture of the cervical spine. No jumped or perched facet is noted. Multilevel degenerative disc and degenerative joint disease is noted. The prevertebral soft tissues are unremarkable. The airway is widely patent.  Images through the lung apices are negative for a pneumothorax. 1. There is no acute compression fracture or subluxation of the cervical spine. 2. Multilevel degenerative disc and degenerative joint disease. CT ABDOMEN PELVIS W IV CONTRAST Additional Contrast? None    Result Date: 11/2/2022  EXAMINATION: CT OF THE ABDOMEN AND PELVIS WITH CONTRAST 11/2/2022 11:31 am TECHNIQUE: CT of the abdomen and pelvis was performed with the administration of intravenous contrast. Multiplanar reformatted images are provided for review. Automated exposure control, iterative reconstruction, and/or weight based adjustment of the mA/kV was utilized to reduce the radiation dose to as low as reasonably achievable. COMPARISON: None. HISTORY: ORDERING SYSTEM PROVIDED HISTORY: multiple falls TECHNOLOGIST PROVIDED HISTORY: Reason for exam:->multiple falls Additional Contrast?->None Decision Support Exception - unselect if not a suspected or confirmed emergency medical condition->Emergency Medical Condition (MA) What reading provider will be dictating this exam?->CRC FINDINGS: Lower Chest: Limited evaluation of the lower lung fields demonstrates mild prominence of the interstitial and bronchovascular markings with no evidence of focal consolidations, a 3.5 x 3.8 x 2.6 cm cavitated lesion is visualized along the inferolateral aspect of the left lower lobe with irregular wall thickening visualized but measuring up to 1.1  cm posteriorly this is seen on axial series 03/15 image 23. 1.5 cm right lower lung field pleural based density seen on axial series 315, image 14. No evidence of acute cardiopulmonary disease is seen. Organs: The liver demonstrates unremarkable attenuation, no evidence of masses no evidence of intrahepatic biliary dilatation is seen. The gallbladder is unremarkable, no evidence of gallbladder wall thickening or pericholecystic  stranding. The common bile duct is unremarkable. The pancreas and spleen demonstrate no evidence of masses.  Prominence of the left adrenal gland is visualized with nodularity differential diagnosis would include adrenal adenoma, hyperplasia no evidence of stranding of the adjacent fat planes to suggest adrenal hemorrhage the right adrenal gland demonstrates subtle nodularity. The kidneys demonstrate no evidence of stones no evidence of renal masses. No evidence of hydronephrosis or hydroureter is seen. GI/Bowel: The stomach is unremarkable, no evidence of masses. No significant distention of the small and large bowel loops is visualized. The appendix is visualized and is unremarkable. Abundance of stool is visualized in the large bowel. Scattered diverticular disease visualized but no evidence of acute diverticulitis. Vessels: Atherosclerotic calcifications visualized in the abdominal aorta with focal aneurysmal dilatation visualized in the distal abdominal aorta and in the right common iliac artery with maximal transverse diameters of 3.5 x 3.5 cm and 1.9 by 2 point 3 cm respectively. .  No evidence of arterial dissection is seen. Pelvis: The urinary bladder is not optimally distended. Anteverted uterus demonstrates prominence of the endometrial cavity with the low-attenuation seen and 1.1 cm area of increased attenuation visualized within the fundus of the uterus that could represent a mass would recommend further evaluation with transvaginal pelvic ultrasound. No evidence of free fluid in the pelvis. No evidence of pelvic mass is seen. Peritoneum/Retroperitoneum: No evidence of free fluid or air within the peritoneal cavity. Bones/Soft Tissues: Abdominal wall soft tissues are unremarkable. Extensive degenerative changes of the lumbar spine visualized. Enhancing area visualized within the endometrial cavity with surrounding low attenuation worrisome for a mass would recommend further evaluation with transvaginal pelvic ultrasound. Focal aneurysmal dilatation of the distal abdominal aorta and common iliac artery.   Abdominal aorta maximal transverse diameters of 3.5 x 3.5 cm. Nodularity visualized in the left adrenal gland, differential diagnosis would include adrenal adenoma, adrenal hyperplasia less likely adrenal hemorrhage. Extensive degenerative bone changes are seen. Small type 1 hiatus hernia. 3.8 cm cavitary lesion visualized in the inferolateral left lower lobe. Prominent chronic interstitial and bronchovascular lung changes is seen. XR CHEST PORTABLE    Result Date: 11/2/2022  EXAMINATION: ONE XRAY VIEW OF THE CHEST 11/2/2022 11:14 am COMPARISON: None. HISTORY: ORDERING SYSTEM PROVIDED HISTORY: shortness of breath TECHNOLOGIST PROVIDED HISTORY: Reason for exam:->shortness of breath What reading provider will be dictating this exam?->CRC FINDINGS: Mild hazy opacity in the lower lungs. The heart is not enlarged. No pneumothorax. Minimal blunting of the left costophrenic angle. Mild bibasilar infiltrates slightly worse on the left. CTA PULMONARY W CONTRAST    Result Date: 11/2/2022  EXAMINATION: CTA OF THE CHEST 11/2/2022 11:31 am TECHNIQUE: CTA of the chest was performed after the administration of intravenous contrast.  Multiplanar reformatted images are provided for review. MIP images are provided for review. Automated exposure control, iterative reconstruction, and/or weight based adjustment of the mA/kV was utilized to reduce the radiation dose to as low as reasonably achievable. COMPARISON: None. HISTORY: ORDERING SYSTEM PROVIDED HISTORY: shortness of breath, multiple falls TECHNOLOGIST PROVIDED HISTORY: Reason for exam:->shortness of breath, multiple falls Decision Support Exception - unselect if not a suspected or confirmed emergency medical condition->Emergency Medical Condition (MA) What reading provider will be dictating this exam?->CRC FINDINGS: Pulmonary Arteries: Upper normal in caliber. No PE.  Lungs and pleura: Bilateral lower lobe and perihilar airspace opacities compatible with pulmonary edema, or less likely, pneumonia. Bilateral upper lobe scattered ground-glass opacities. No pleural effusion or pneumothorax. Heart and mediastinum: Borderline cardiomegaly and with mild left atrial enlargement. No pericardial effusion. Clustered subcarinal lymph nodes measuring 1.7 cm in AP dimension. Multiple additional mildly enlarged mediastinal lymph nodes and bilateral hilar nodes. Left axillary 1.9 cm node versus epithelial inclusion cyst.  Normal right axilla. Atherosclerotic thoracic aorta which is normal in caliber. Upper abdomen: Please see separate report Bones: No acute osseous abnormality. 1.  No PE. 2.  Bilateral widespread airspace opacities compatible pulmonary edema, or less likely pneumonia. Left atrial enlargement. 3.  Mediastinal and bilateral hilar lymph node enlargement, nonspecific. RECOMMENDATIONS: Unavailable       ASSESSMENT:    Acute hypoxic respiratory failure in setting of COVID-19 in pneumonia, suspected CHF. Unknown if underlying COPD. Longstanding current smoking  Obesity  Generalized weakness/deconditioning. In the setting of above  Erythrocytosis  Number cytopenia  Elevated troponin in the setting of problem #1. No chest pain. Unknown if underlying CAD  Microscopic hematuria, proteinuria  History of urinary incontinence  Left forehead and left upper extremity laceration. Received tetanus vaccine  Suspected endometrial mass. Seen on CT of pelvis with  Possible adrenal adenoma versus adrenal hyperplasia versus adrenal hemorrhage. 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 Has not have follow-up in few years  Hypoacusia. She has right-sided hearing aid  Patient is full code. Discussed with patient and son at bedside     PLAN:    -Continue cardiac monitoring and pulse oximeter monitoring  -Oxygen supplementation as needed to maintain O2 to sats of 90%.   Wean as able  -COVID-19 isolation-Radha Louis and budesonide inhalers  -Dexamethasone 6 mg daily for 10 doses  -Procalcitonin, D-dimer, Legionella and strep urinary antigens  -Out of bed, ambulate in room with assistance  - Pulmonary consult   - TV US to assess endometrium   - Hormonal adrenal test   - Insert Ball if urinary retention. Monitor I/O  - Lasix 20 mg IV BID and reevaluate   - Lovenox for DVT prophylaxis          Diet: No diet orders on file  Code Status: No Order  Surrogate decision maker confirmed with patient:   Extended Emergency Contact Information  Primary Emergency Contact: Jada Lew  Address: [address] [place] [address] Phone: [phone]  Relation: Brother/Sister    DVT Prophylaxis: []Lovenox []Heparin []PCD [] [address]  []Encouraged ambulation  Disposition: []Med/Surg [] Intermediate [] ICU/CCU  Admit status: [] Observation [] Inpatient     +++++++++++++++++++++++++++++++++++++++++++++++++  MD Edilson Pearce [facility], [address]  +++++++++++++++++++++++++++++++++++++++++++++++++  NOTE: This report was transcribed using voice recognition software. Every effort was made to ensure accuracy; however, inadvertent computerized transcription errors may be present.

## 2022-11-03 PROBLEM — Z91.199 NONCOMPLIANCE: Status: ACTIVE | Noted: 2022-11-03

## 2022-11-03 PROBLEM — Z72.0 TOBACCO ABUSE: Status: ACTIVE | Noted: 2022-11-03

## 2022-11-03 PROBLEM — J12.82 PNEUMONIA DUE TO COVID-19 VIRUS: Status: ACTIVE | Noted: 2022-11-03

## 2022-11-03 PROBLEM — H91.90 HOH (HARD OF HEARING): Status: ACTIVE | Noted: 2022-11-03

## 2022-11-03 PROBLEM — E55.9 VITAMIN D DEFICIENCY: Status: ACTIVE | Noted: 2022-11-03

## 2022-11-03 PROBLEM — U07.1 PNEUMONIA DUE TO COVID-19 VIRUS: Status: ACTIVE | Noted: 2022-11-03

## 2022-11-03 LAB
ALBUMIN SERPL-MCNC: 2.9 G/DL (ref 3.5–5.2)
ALP BLD-CCNC: 48 U/L (ref 35–104)
ALT SERPL-CCNC: 22 U/L (ref 0–32)
ANION GAP SERPL CALCULATED.3IONS-SCNC: 12 MMOL/L (ref 7–16)
AST SERPL-CCNC: 56 U/L (ref 0–31)
BASOPHILS ABSOLUTE: 0.01 E9/L (ref 0–0.2)
BASOPHILS RELATIVE PERCENT: 0.2 % (ref 0–2)
BILIRUB SERPL-MCNC: 0.3 MG/DL (ref 0–1.2)
BUN BLDV-MCNC: 28 MG/DL (ref 6–23)
CALCIUM SERPL-MCNC: 8.1 MG/DL (ref 8.6–10.2)
CHLORIDE BLD-SCNC: 101 MMOL/L (ref 98–107)
CO2: 22 MMOL/L (ref 22–29)
CREAT SERPL-MCNC: 0.7 MG/DL (ref 0.5–1)
D DIMER: 674 NG/ML DDU
EOSINOPHILS ABSOLUTE: 0 E9/L (ref 0.05–0.5)
EOSINOPHILS RELATIVE PERCENT: 0 % (ref 0–6)
GFR SERPL CREATININE-BSD FRML MDRD: >60 ML/MIN/1.73
GLUCOSE BLD-MCNC: 152 MG/DL (ref 74–99)
HCT VFR BLD CALC: 44.8 % (ref 34–48)
HEMOGLOBIN: 14.6 G/DL (ref 11.5–15.5)
IMMATURE GRANULOCYTES #: 0.04 E9/L
IMMATURE GRANULOCYTES %: 0.7 % (ref 0–5)
LYMPHOCYTES ABSOLUTE: 0.69 E9/L (ref 1.5–4)
LYMPHOCYTES RELATIVE PERCENT: 12.6 % (ref 20–42)
MCH RBC QN AUTO: 28.4 PG (ref 26–35)
MCHC RBC AUTO-ENTMCNC: 32.6 % (ref 32–34.5)
MCV RBC AUTO: 87.2 FL (ref 80–99.9)
MONOCYTES ABSOLUTE: 0.36 E9/L (ref 0.1–0.95)
MONOCYTES RELATIVE PERCENT: 6.6 % (ref 2–12)
NEUTROPHILS ABSOLUTE: 4.37 E9/L (ref 1.8–7.3)
NEUTROPHILS RELATIVE PERCENT: 79.9 % (ref 43–80)
PDW BLD-RTO: 14.9 FL (ref 11.5–15)
PLATELET # BLD: 114 E9/L (ref 130–450)
PMV BLD AUTO: 11.5 FL (ref 7–12)
POTASSIUM REFLEX MAGNESIUM: 4.2 MMOL/L (ref 3.5–5)
RBC # BLD: 5.14 E12/L (ref 3.5–5.5)
SODIUM BLD-SCNC: 135 MMOL/L (ref 132–146)
TOTAL PROTEIN: 5.8 G/DL (ref 6.4–8.3)
VITAMIN D 25-HYDROXY: 11 NG/ML (ref 30–100)
WBC # BLD: 5.5 E9/L (ref 4.5–11.5)

## 2022-11-03 PROCEDURE — 36415 COLL VENOUS BLD VENIPUNCTURE: CPT

## 2022-11-03 PROCEDURE — 94640 AIRWAY INHALATION TREATMENT: CPT

## 2022-11-03 PROCEDURE — 85378 FIBRIN DEGRADE SEMIQUANT: CPT

## 2022-11-03 PROCEDURE — 85025 COMPLETE CBC W/AUTO DIFF WBC: CPT

## 2022-11-03 PROCEDURE — 2700000000 HC OXYGEN THERAPY PER DAY

## 2022-11-03 PROCEDURE — 80053 COMPREHEN METABOLIC PANEL: CPT

## 2022-11-03 PROCEDURE — 6370000000 HC RX 637 (ALT 250 FOR IP): Performed by: INTERNAL MEDICINE

## 2022-11-03 PROCEDURE — 6360000002 HC RX W HCPCS: Performed by: STUDENT IN AN ORGANIZED HEALTH CARE EDUCATION/TRAINING PROGRAM

## 2022-11-03 PROCEDURE — 87081 CULTURE SCREEN ONLY: CPT

## 2022-11-03 PROCEDURE — 6360000002 HC RX W HCPCS

## 2022-11-03 PROCEDURE — 82306 VITAMIN D 25 HYDROXY: CPT

## 2022-11-03 PROCEDURE — 97530 THERAPEUTIC ACTIVITIES: CPT

## 2022-11-03 PROCEDURE — 2580000003 HC RX 258: Performed by: STUDENT IN AN ORGANIZED HEALTH CARE EDUCATION/TRAINING PROGRAM

## 2022-11-03 PROCEDURE — 2580000003 HC RX 258

## 2022-11-03 PROCEDURE — 2140000000 HC CCU INTERMEDIATE R&B

## 2022-11-03 PROCEDURE — XW033H5 INTRODUCTION OF TOCILIZUMAB INTO PERIPHERAL VEIN, PERCUTANEOUS APPROACH, NEW TECHNOLOGY GROUP 5: ICD-10-PCS | Performed by: STUDENT IN AN ORGANIZED HEALTH CARE EDUCATION/TRAINING PROGRAM

## 2022-11-03 PROCEDURE — 97161 PT EVAL LOW COMPLEX 20 MIN: CPT

## 2022-11-03 PROCEDURE — 6370000000 HC RX 637 (ALT 250 FOR IP): Performed by: NURSE PRACTITIONER

## 2022-11-03 PROCEDURE — 6360000002 HC RX W HCPCS: Performed by: INTERNAL MEDICINE

## 2022-11-03 RX ORDER — DEXAMETHASONE SODIUM PHOSPHATE 4 MG/ML
10 INJECTION, SOLUTION INTRA-ARTICULAR; INTRALESIONAL; INTRAMUSCULAR; INTRAVENOUS; SOFT TISSUE EVERY 24 HOURS
Status: DISCONTINUED | OUTPATIENT
Start: 2022-11-04 | End: 2022-11-03

## 2022-11-03 RX ORDER — MECOBALAMIN 5000 MCG
5 TABLET,DISINTEGRATING ORAL NIGHTLY
Status: DISCONTINUED | OUTPATIENT
Start: 2022-11-03 | End: 2022-11-10 | Stop reason: HOSPADM

## 2022-11-03 RX ORDER — MECOBALAMIN 5000 MCG
5 TABLET,DISINTEGRATING ORAL NIGHTLY PRN
Status: DISCONTINUED | OUTPATIENT
Start: 2022-11-03 | End: 2022-11-10 | Stop reason: HOSPADM

## 2022-11-03 RX ORDER — ERGOCALCIFEROL 1.25 MG/1
50000 CAPSULE ORAL WEEKLY
Status: DISCONTINUED | OUTPATIENT
Start: 2022-11-03 | End: 2022-11-10 | Stop reason: HOSPADM

## 2022-11-03 RX ORDER — CHOLECALCIFEROL (VITAMIN D3) 50 MCG
2000 TABLET ORAL DAILY
Status: DISCONTINUED | OUTPATIENT
Start: 2022-11-03 | End: 2022-11-10 | Stop reason: HOSPADM

## 2022-11-03 RX ORDER — ZINC SULFATE 50(220)MG
50 CAPSULE ORAL DAILY
Status: DISCONTINUED | OUTPATIENT
Start: 2022-11-03 | End: 2022-11-10 | Stop reason: HOSPADM

## 2022-11-03 RX ORDER — IPRATROPIUM BROMIDE AND ALBUTEROL SULFATE 2.5; .5 MG/3ML; MG/3ML
1 SOLUTION RESPIRATORY (INHALATION)
Status: DISCONTINUED | OUTPATIENT
Start: 2022-11-03 | End: 2022-11-10 | Stop reason: HOSPADM

## 2022-11-03 RX ORDER — DEXAMETHASONE SODIUM PHOSPHATE 4 MG/ML
10 INJECTION, SOLUTION INTRA-ARTICULAR; INTRALESIONAL; INTRAMUSCULAR; INTRAVENOUS; SOFT TISSUE EVERY 12 HOURS
Status: DISCONTINUED | OUTPATIENT
Start: 2022-11-03 | End: 2022-11-06

## 2022-11-03 RX ORDER — IPRATROPIUM BROMIDE AND ALBUTEROL SULFATE 2.5; .5 MG/3ML; MG/3ML
1 SOLUTION RESPIRATORY (INHALATION)
Status: DISCONTINUED | OUTPATIENT
Start: 2022-11-03 | End: 2022-11-03

## 2022-11-03 RX ORDER — ALBUTEROL SULFATE 90 UG/1
2 AEROSOL, METERED RESPIRATORY (INHALATION) EVERY 4 HOURS PRN
Status: DISCONTINUED | OUTPATIENT
Start: 2022-11-03 | End: 2022-11-10 | Stop reason: HOSPADM

## 2022-11-03 RX ORDER — BENZONATATE 100 MG/1
100 CAPSULE ORAL 3 TIMES DAILY
Status: DISCONTINUED | OUTPATIENT
Start: 2022-11-03 | End: 2022-11-10 | Stop reason: HOSPADM

## 2022-11-03 RX ORDER — ACETAMINOPHEN 500 MG
1000 TABLET ORAL EVERY 6 HOURS PRN
Status: DISCONTINUED | OUTPATIENT
Start: 2022-11-03 | End: 2022-11-10 | Stop reason: HOSPADM

## 2022-11-03 RX ORDER — ASCORBIC ACID 500 MG
1000 TABLET ORAL DAILY
Status: DISCONTINUED | OUTPATIENT
Start: 2022-11-03 | End: 2022-11-10 | Stop reason: HOSPADM

## 2022-11-03 RX ORDER — GUAIFENESIN 400 MG/1
400 TABLET ORAL 3 TIMES DAILY
Status: DISCONTINUED | OUTPATIENT
Start: 2022-11-03 | End: 2022-11-10 | Stop reason: HOSPADM

## 2022-11-03 RX ADMIN — ENOXAPARIN SODIUM 40 MG: 100 INJECTION SUBCUTANEOUS at 22:00

## 2022-11-03 RX ADMIN — GUAIFENESIN 400 MG: 400 TABLET ORAL at 15:34

## 2022-11-03 RX ADMIN — BENZONATATE 100 MG: 100 CAPSULE ORAL at 15:33

## 2022-11-03 RX ADMIN — GUAIFENESIN 400 MG: 400 TABLET ORAL at 22:00

## 2022-11-03 RX ADMIN — ERGOCALCIFEROL 50000 UNITS: 1.25 CAPSULE ORAL at 15:34

## 2022-11-03 RX ADMIN — BUDESONIDE 250 MCG: 0.25 SUSPENSION RESPIRATORY (INHALATION) at 20:29

## 2022-11-03 RX ADMIN — OXYCODONE HYDROCHLORIDE AND ACETAMINOPHEN 1000 MG: 500 TABLET ORAL at 15:32

## 2022-11-03 RX ADMIN — ARFORMOTEROL TARTRATE 15 MCG: 15 SOLUTION RESPIRATORY (INHALATION) at 20:27

## 2022-11-03 RX ADMIN — DEXAMETHASONE SODIUM PHOSPHATE 10 MG: 4 INJECTION, SOLUTION INTRAMUSCULAR; INTRAVENOUS at 22:01

## 2022-11-03 RX ADMIN — GUAIFENESIN 400 MG: 400 TABLET ORAL at 09:42

## 2022-11-03 RX ADMIN — IPRATROPIUM BROMIDE AND ALBUTEROL SULFATE 1 AMPULE: .5; 2.5 SOLUTION RESPIRATORY (INHALATION) at 17:08

## 2022-11-03 RX ADMIN — Medication 2000 UNITS: at 09:42

## 2022-11-03 RX ADMIN — IPRATROPIUM BROMIDE AND ALBUTEROL SULFATE 1 AMPULE: .5; 2.5 SOLUTION RESPIRATORY (INHALATION) at 20:28

## 2022-11-03 RX ADMIN — TOCILIZUMAB 600 MG: 20 INJECTION, SOLUTION, CONCENTRATE INTRAVENOUS at 18:48

## 2022-11-03 RX ADMIN — FUROSEMIDE 20 MG: 10 INJECTION, SOLUTION INTRAMUSCULAR; INTRAVENOUS at 09:33

## 2022-11-03 RX ADMIN — DEXAMETHASONE 6 MG: 4 TABLET ORAL at 09:32

## 2022-11-03 RX ADMIN — BENZONATATE 100 MG: 100 CAPSULE ORAL at 22:00

## 2022-11-03 RX ADMIN — FUROSEMIDE 20 MG: 10 INJECTION, SOLUTION INTRAMUSCULAR; INTRAVENOUS at 18:45

## 2022-11-03 RX ADMIN — Medication 5 MG: at 22:00

## 2022-11-03 RX ADMIN — Medication 10 ML: at 09:33

## 2022-11-03 RX ADMIN — ENOXAPARIN SODIUM 40 MG: 100 INJECTION SUBCUTANEOUS at 09:33

## 2022-11-03 RX ADMIN — BENZONATATE 100 MG: 100 CAPSULE ORAL at 09:42

## 2022-11-03 RX ADMIN — Medication 50 MG: at 15:32

## 2022-11-03 RX ADMIN — Medication 10 ML: at 22:21

## 2022-11-03 NOTE — PROGRESS NOTES
Hospitalist Progress Note            Patient: Lauri Mcneil Age: 78 y.o.   DOA: 11/2/2022 Admit Dx / CC: Acute respiratory failure (HCC) [J96.00]  Pelvic mass [R19.00]  Cavitary lesion of lung [J98.4]  Acute respiratory failure with hypoxia ([address] [J96.01]  COVID [U07.1]  COVID-19 virus infection [U07.1]   LOS:  LOS: 1 day      Assessment/ Plan:     Generalized weakness with falls 2/2 Acute hypoxic respiratory failure 2/2 COVID-19 in pneumonia, r/o CHF. Also suspect underlying undiagnosed COPD/Longstanding active smoker  Counseld on smoking cessation  Cont O2 supplement and wean off as tolerated, down to 7 L, Keep SpO2 >=88%  Cont decadron  Nebs  Bronchial hygiene  IV lasix  Echo pend  Recommend OP PFTs  Pulmo consulted  PT/OT    Obesity, BMI 29    Mildly elevated troponin suspect 2/2 demand  No chest pain. Trend flat and not consistent with NSTEMI    Urinary incontinence with retention ? overflow incontinence   Bladder scans    Left forehead and left upper extremity laceration 2/2 fall  Received tetanus vaccine  Wound care    Possible adrenal adenoma versus adrenal hyperplasia versus adrenal hemorrhage by CT scan report  Recommend OP w/u for Cushings syndrome    Very [place]  She has right-sided hearing aid    Abnormally thickened endometrium  OP f/u with GYN    Vitamin D deficiency  Start replacement    DVT ppx:  Lovenox  Code status: Full code    Plan discharge pend coarse    Plan of care discussed with: patient and bedside RN    CT:  Impression   Enhancing area visualized within the endometrial cavity with surrounding low   attenuation worrisome for a mass would recommend further evaluation with   transvaginal pelvic ultrasound. Focal aneurysmal dilatation of the distal abdominal aorta and common iliac   artery. Abdominal aorta maximal transverse diameters of 3.5 x 3.5 cm.        Nodularity visualized in the left adrenal gland, differential diagnosis would   include adrenal adenoma, adrenal hyperplasia less likely adrenal hemorrhage. Extensive degenerative bone changes are seen. Small type 1 hiatus hernia. 3.8 cm cavitary lesion visualized in the inferolateral left lower lobe. Prominent chronic interstitial and bronchovascular lung changes is seen. TV US:  Impression   Abnormally thickened endometrium. The apparent enhancing intracavitary mass,   as seen on the prior CT, is not evident by transabdominal sonography. .  Ob   consultation and histologic sampling are recommended.      Patient Active Problem List   Diagnosis    Acute respiratory failure (HCC)    Pneumonia due to COVID-19 virus    Tobacco abuse    Vitamin D deficiency    [place] (hard of hearing)    Noncompliance        Medications:  Reviewed    Infusion Medications    sodium chloride       Scheduled Medications    guaiFENesin  400 mg Oral TID    benzonatate  100 mg Oral TID    melatonin  5 mg Oral Nightly    vitamin D  50,000 Units Oral Weekly    vitamin D  2,000 Units Oral Daily    [START ON 11/4/2022] dexamethasone  10 mg IntraVENous Q24H    ascorbic acid  1,000 mg Oral Daily    zinc sulfate  50 mg Oral Daily    sodium chloride flush  5-40 mL IntraVENous 2 times per day    enoxaparin  40 mg SubCUTAneous BID    furosemide  20 mg IntraVENous BID    Arformoterol Tartrate  15 mcg Nebulization BID    budesonide  250 mcg Nebulization BID     PRN Meds: acetaminophen **OR** [DISCONTINUED] acetaminophen, melatonin, albuterol sulfate HFA, sodium chloride flush, sodium chloride, ondansetron **OR** ondansetron, polyethylene glycol, guaiFENesin-dextromethorphan    I/O    Intake/Output Summary (Last 24 hours) at 11/3/2022 1221  Last data filed at 11/3/2022 1009  Gross per 24 hour   Intake 300 ml   Output --   Net 300 ml       Labs:   Recent Labs     11/02/22  1012 11/03/22  0536   WBC 9.8 5.5   HGB 15.6* 14.6   HCT 45.7 44.8   * 114*       Recent Labs     11/02/22  1012 11/03/22  0536    135   K 3.8 4.2   CL 99 101 CO2 21* 22   BUN 21 28*   CREATININE 0.7 0.7   CALCIUM 8.0* 8.1*       Recent Labs     11/02/22  1012 11/03/22  0536   PROT 6.0* 5.8*   ALKPHOS 52 48   ALT 26 22   AST 69* 56*   BILITOT 0.5 0.3       No results for input(s): INR in the last 72 hours. No results for input(s): [place] in the last 72 hours. Chronic labs:  Lab Results   Component Value Date    CHOL 230 (H) 09/13/2017    TRIG 273 (H) 09/13/2017    HDL 37 09/13/2017    LDLCALC 138 (H) 09/13/2017    TSH 2.040 09/13/2017       Radiology:  Imaging studies reviewed today. Subjective:     Fozia Hemphill says feeling much better but wants to get out of bed    Objective:     Physical Exam:   /71   Pulse 64   Temp 97.9 °F (36.6 °C) (Oral)   Resp 16   Wt 187 lb 8 oz (85 kg)   SpO2 94%   BMI 29.37 kg/m²     General appearance:in no distress, semisupine  Lungs: diminished in bases bilaterally, no wheezing or crackles   Heart: Regular rate and rhythm, S1, S2 normal   Abdomen: Soft, non-tender and not-distended.  Bowel sounds normal.   Extremities: no edema   Neurologic: Grossly normal and non focal, CN II-XII sig for [facility]      Salvador Kraft MD   Hospitalist Service   11/03/22 12:21 PM

## 2022-11-03 NOTE — CARE COORDINATION
11/3 Care Coordination. Pt was admit from ED yesterday for frequent falls associated with dizziness, diarrhea, and hypoxia. COVID positive. Currently on 12L HFNC. CTA pulmonary negative for PE, but revealed bilateral widespread opacities compatible with pulmonary edema or less likely PNA. US Pelvis completed due to findings on CT. Abnormally thickened endometrium noted, recommended OB consultation. Pulmonology consulted. ECHO pending. PT/OT evals pending. Receiving Decadron IV Q12H, Lasix IV BID, and breathing treatments. Attempted to meet with pt at bedside to discuss transition of care planning but bedside RN was changing pt and when second attempt was made, PT was working with pt. No previous CM/SW documentation to review.     Komal Lea, ABHIJEETN, RN  [facility] Case Management   Cell: [phone]

## 2022-11-03 NOTE — PLAN OF CARE
Patient's chart updated to reflect:      . - HF care plan, HF education points and HF discharge instructions.  -Orders: 2 gram sodium diet, daily weights, I/O.  -PCP and/or Cardiologist appointment to be scheduled within 7 days of hospital discharge.  -History of HF, not primary admission Dx.   Patient admitted for treatment of   Acute hypoxic respiratory failure in setting of COVID-1  Sharon Wolfe RN RN, BSN  Heart Failure Navigator

## 2022-11-03 NOTE — PROGRESS NOTES
The patient is complaining of increased abd pressure and pain, patient bladder scanned at this time after she urinated and bladder scan results showed 680 ml. Ball placed at this time and there was a urinary output of 1350 of clear yellow urine. Patient states she feels so much better and the pain is no longer there.

## 2022-11-03 NOTE — CONSULTS
Marcio Nathan M.D.,[facility]  Shyanne Pacheco D.O., F.A.ESLENA.OMANDY., Yamilka Mckeon M.D. Genoveva Piper M.D. Stephanie Trent D.O. Patient:  Brigida Bradshaw 78 y.o. female MRN: [id number]     Date of Service: 11/3/2022      PULMONARY CONSULTATION    Reason for Consultation: Covid Pneumonia  Referring Physician:  Dr Jb Rutherford MD    Communication with the referring physician will be sent via the electronic medical record. Chief Complaint:  fall    CODE STATUS: Full    SUBJECTIVE:  HPI:  Brigida Bradshaw is a 78 y.o. who we are asked to evaluate for covid pneumonia. No significant past medical history. She has not been seen in the past by a pulmonologist.    She presented to the ED at [facility] on 11/2/2022 after suffering a fall at home. She hit her head on dresser, laceration on her arm. Felt weakness and fatigue. Denies sick contacts at home. No recent travel. + fever, 101 at home. + cough, scant mucus. White, clear in color. No nausea or vomiting. No abdominal pain. No chest pain. No formal diagnosis of copd or asthma. +active smoker since age 17, for 63 yrs up to 1 ppd. Admits to unintentional weight loss 68 lbs over 1 year. Tested positive for covid 19 on admission. Has not received any vaccines. She is now requiring 7 L high flow nasal cannula oxygen, down from 13 L. Radiology reviewed-CTA chest no pulmonary embolism. Bilateral wide airspace with opacities compatible with pulmonary edema. Mediastinal and bilateral hilar lymph node enlargement. Clustered subcarinal lymph nodes measuring 1.7 cm in AP dimension. Left axillary node 1.9 cm.  3.5 x 3.8 x 2.6 cavitary lesion visualized along the inferior lateral aspect of the left lower lobe with irregular wall and thickening. The CT abdomen and pelvis with enhancing area visualized within the endometrial cavity with surrounding low-attenuation worrisome for a mass. Small type I hiatal hernia.     Lab testing-sodium 135, potassium 4.2, CO2 22, BUN 28, creatinine 0.7, total protein 5.8, procalcitonin 0.20, CRP 10.8, high-sensitivity troponin 13, AST 56, proBNP 685 , ALT 22, vitamin D11, WBCs 5.5, hemoglobin 14.6, D-dimer 674, rapid COVID test positive, influenza negative. Strep pneumo Legionella urine antigen-pending. MRSA nasal swab pending. Is lying in bed in no acute distress. Has a moist hacking cough not expectorating. Past Medical History:   Diagnosis Date    Fluid retention in legs        Past Surgical History:   Procedure Laterality Date    DENTAL SURGERY      full mouth with dentures    TONSILLECTOMY      age 13     TUBAL LIGATION      45 years ago     TUBAL LIGATION         History reviewed. No pertinent family history. Father  age 58 cerebral brain hemorrhage  Mother  age 80 CABG x 3 pacemaker, CAD    Social History:   Social History     Socioeconomic History    Marital status:      Spouse name: Not on file    Number of children: Not on file    Years of education: Not on file    Highest education level: Not on file   Occupational History    Not on file   Tobacco Use    Smoking status: Every Day     Packs/day: 0.50     Years: 35.00     Pack years: 17.50     Types: Cigarettes    Smokeless tobacco: Never   Substance and Sexual Activity    Alcohol use: No    Drug use: No    Sexual activity: Not on file   Other Topics Concern    Not on file   Social History Narrative    Not on file     Social Determinants of Health     Financial Resource Strain: Not on file   Food Insecurity: Not on file   Transportation Needs: Not on file   Physical Activity: Not on file   Stress: Not on file   Social Connections: Not on file   Intimate Partner Violence: Not on file   Housing Stability: Not on file     Smoking history: The patient is an active smoker 61 yrs 1 ppd    ETOH:   reports no history of alcohol use. Exposures:  There  are no known exposures to asbestos or TB    Vaccines:     Needs covid and flu vaccines  Immunization History   Administered Date(s) Administered    Td (Adult), 2 Lf Tetanus Toxoid, Pf (Td, Absorbed) 11/02/2022        Home Meds: No medications prior to admission. CURRENT MEDS :  Scheduled Meds:   guaiFENesin  400 mg Oral TID    benzonatate  100 mg Oral TID    melatonin  5 mg Oral Nightly    vitamin D  50,000 Units Oral Weekly    vitamin D  2,000 Units Oral Daily    ascorbic acid  1,000 mg Oral Daily    zinc sulfate  50 mg Oral Daily    dexamethasone  10 mg IntraVENous Q12H    ipratropium-albuterol  1 ampule Inhalation Q4H WA    tocilizumab (ACTEMRA) IVPB  600 mg IntraVENous Once    sodium chloride flush  5-40 mL IntraVENous 2 times per day    enoxaparin  40 mg SubCUTAneous BID    furosemide  20 mg IntraVENous BID    Arformoterol Tartrate  15 mcg Nebulization BID    budesonide  250 mcg Nebulization BID       Continuous Infusions:   sodium chloride         No Known Allergies    REVIEW OF SYSTEMS:  Constitutional: Denies fever, weight loss, night sweats, and fatigue, fevers at home, fatigue  Skin: Denies pigmentation, dark lesions, and rashes   HEENT: Denies hearing loss, tinnitus, ear drainage, epistaxis, sore throat, and hoarseness. Cardiovascular: Denies palpitations, chest pain, and chest pressure. Respiratory: as above.   Hacking cough  Gastrointestinal: Denies nausea, vomiting, poor appetite, diarrhea, heartburn or reflux unintentional weight loss  Genitourinary: Denies dysuria, frequency, urgency or hematuria  Musculoskeletal: Denies myalgias, muscle weakness, and bone pain prior fall at home laceration to arm and forehead  Neurological: Denies dizziness, vertigo, headache, and focal weakness  Psychological: Denies anxiety and depression  Endocrine: Denies heat intolerance and cold intolerance  Hematopoietic/Lymphatic: Denies bleeding problems and blood transfusions    OBJECTIVE:   /71   Pulse 64   Temp 97.9 °F (36.6 °C) (Oral)   Resp 16   Wt 187 lb 8 oz (85 kg)   SpO2 94% BMI 29.37 kg/m²   SpO2 Readings from Last 1 Encounters:   11/03/22 94%        I/O:    Intake/Output Summary (Last 24 hours) at 11/3/2022 1531  Last data filed at 11/3/2022 1009  Gross per 24 hour   Intake 300 ml   Output --   Net 300 ml          Physical Exam:  General: The patient is lying in bed comfortably without any distress. Breathing is not labored  HEENT: Pupils are equal round and reactive to light, there are no oral lesions and no post-nasal drip   Neck: supple without adenopathy  Cardiovascular: regular rate and rhythm without murmur or gallop  Respiratory: Few crackles bilaterally to auscultation bilaterally without wheezing or crackles. Air entry is symmetric  Abdomen: soft, non-tender, non-distended, normal bowel sounds  Extremities: warm, no edema, no clubbing  Skin: no rash or lesion  Neurologic: CN II-XII grossly intact, no focal deficits    Pulmonary Function Testing   Not on file    Imaging personally reviewed:  CTA chest  FINDINGS:   Pulmonary Arteries: Upper normal in caliber. No PE. Lungs and pleura: Bilateral lower lobe and perihilar airspace opacities   compatible with pulmonary edema, or less likely, pneumonia. Bilateral upper   lobe scattered ground-glass opacities. No pleural effusion or pneumothorax. Heart and mediastinum: Borderline cardiomegaly and with mild left atrial   enlargement. No pericardial effusion. Clustered subcarinal lymph nodes   measuring 1.7 cm in AP dimension. Multiple additional mildly enlarged   mediastinal lymph nodes and bilateral hilar nodes. Left axillary 1.9 cm node   versus epithelial inclusion cyst.  Normal right axilla. Atherosclerotic   thoracic aorta which is normal in caliber. Upper abdomen: Please see separate report       Bones: No acute osseous abnormality. Impression   1. No PE.       2.  Bilateral widespread airspace opacities compatible pulmonary edema, or   less likely pneumonia. Left atrial enlargement. 3.  Mediastinal and bilateral hilar lymph node enlargement, nonspecific. RECOMMENDATIONS:   Unavailable               Echo:  Pending     Labs:  Lab Results   Component Value Date/Time    WBC 5.5 11/03/2022 05:36 AM    HGB 14.6 11/03/2022 05:36 AM    HCT 44.8 11/03/2022 05:36 AM    MCV 87.2 11/03/2022 05:36 AM    MCH 28.4 11/03/2022 05:36 AM    MCHC 32.6 11/03/2022 05:36 AM    RDW 14.9 11/03/2022 05:36 AM     11/03/2022 05:36 AM    MPV 11.5 11/03/2022 05:36 AM     Lab Results   Component Value Date/Time     11/03/2022 05:36 AM    K 4.2 11/03/2022 05:36 AM     11/03/2022 05:36 AM    CO2 22 11/03/2022 05:36 AM    BUN 28 11/03/2022 05:36 AM    CREATININE 0.7 11/03/2022 05:36 AM    LABALBU 2.9 11/03/2022 05:36 AM    CALCIUM 8.1 11/03/2022 05:36 AM    GFRAA >60 09/13/2017 03:04 PM    LABGLOM >60 11/03/2022 05:36 AM     No results found for: PROTIME, INR  Recent Labs     11/02/22  1012   PROBNP 685*     No results for input(s): TROPONINI in the last 72 hours. Recent Labs     11/02/22  1800   PROCAL 0.20*     This SmartLink has not been configured with any valid records. Micro:  No results for input(s): CULTRESP in the last 72 hours. No results for input(s): LABGRAM in the last 72 hours. No results for input(s): LEGUR in the last 72 hours. No results for input(s): STREPNEUMAGU in the last 72 hours. No results for input(s): LP1UAG in the last 72 hours.      SARS-COV-2 biomarkers    Recent Labs     11/02/22  1012 11/02/22  1800 11/03/22  0536   CRP  --  10.8*  --    PROCAL  --  0.20*  --    DDIMER  --   --  674   AST 69*  --  56*   ALT 26  --  22      Lab Results   Component Value Date/Time    VITD25 11 (L) 11/03/2022 05:36 AM         Assessment:  COVID-19 pneumonia, unvaccinated  Acute respiratory failure with hypoxia  Community-acquired pneumonia  Pelvic mass  Unintentional weight loss 68 pounds over 1 year  Ongoing nicotine dependence up to 1 pack/day for 63 years  Fall at home hit head, laceration left side forehead and left arm    Plan:  Oxygen therapy 7 L high flow wean to keep greater than 92%  Elevated inflammatory markers, fever at home. We will ask pharmacy to dose tocilizumab. Does not meet criteria for remdesivir. Increase dexamethasone 10 mg IV twice daily. Add vitamins-C, D, and zinc  Bronchodilators via nebulizer with Respirgard to prevent droplet spread. Brovana and budesonide, add duo nebs. Chest imaging reviewed  Incentive spirometer, lung recruitment maneuvers  2D echo pending  Await final cultures, trend inflammatory markers-pending. Monitor off antibiotics. PT/OT  DVT, GI prophylaxis  Tobacco cessation counseling      Thank you for allowing me to participate in the care of Ivett Flores. Please feel free to call with questions. This plan of care was reviewed in collaboration with Dr. Faye Pelayo    Electronically signed by CHRISTINA Baer CNP on 11/3/2022 at 3:31 PM      Note: This report was completed utilizing computer voice recognition software. Every effort has been made to ensure accuracy, however; inadvertent computerized transcription errors may be present        I personally saw, examined and provided care for the patient. Radiographs, labs and medication list were reviewed by me independently. I spoke with bedside nursing, therapists and consultants. The case was discussed in detail and plans for care were established. Review of CNP documentation was conducted and revisions were made as appropriate. I agree with the above documented exam, problem list and plan of care.    Bakari Kahn MD

## 2022-11-03 NOTE — PROGRESS NOTES
Detail Level: Detailed Physical Therapy   Initial Assessment     Name: Tabitha Rivera  : 1945  MRN: [id number]      Date of Service: 11/3/2022    Evaluating PT:  Brice Fernandez. Jenni Fajardo, [facility], [id number]    Room #:  2876/9175-G  Diagnosis:  Acute respiratory failure ([facility] [address] [J96.00]  Pelvic mass [R19.00]  Cavitary lesion of lung [J98.4]  Acute respiratory failure with hypoxia ([facility] [address] [facility] [J96.01]  COVID [U07.1]  COVID-19 virus infection [U07.1]  PMHx/PSHx:     has a past medical history of Fluid retention in legs. has a past surgical history that includes Tubal ligation; Tonsillectomy; Dental surgery; and Tubal ligation. Precautions:  Fall risk, droplet+ (COVID)  Equipment Needs:  TBD, pt would likely benefit from a walker    SUBJECTIVE:    Pt lives with her granddaughter in a 1 story home with 3 stairs to enter and 1 rail. Pt ambulated with no AD PTA. OBJECTIVE:   Initial Evaluation  Date: 11/3/22 Treatment Short Term/ Long Term   Goals   AM-PAC 6 Clicks      Was pt agreeable to Eval/treatment? Yes     Does pt have pain?  Denied     Bed Mobility  Rolling: Elma  Supine to sit: ModA  Sit to supine: SBA  Scooting: MaxA  Modified Independent    Transfers Sit to stand: ModA  Stand to sit: ModA  Stand pivot: NT  SBA    Ambulation    5 feet forward and backward with Elma  >50 feet with AAD with SBA   Stair negotiation: ascended and descended  NT  >4 steps with 1 rail with SBA   BLE ROM WFL     BLE Strength Grossly 4/5     Balance Sitting: Supervision  Standing: Min/ModA  Sitting: Independent   Standing: SBA     Pt is A & O x 4  Sensation:  Denied numbness/tingling  Edema:  None noted in BLE    Therapeutic Exercises:    Ambulation: 5 feet forward, 5 feet laterally each way with no AD with Elma  Standing balance activities: performed static and dynamic standing balance activities with single and BUE support with reaches within and outside YAS with Elma     Patient education  Pt educated on [facility], bed mobility, transfer technique, benefits of Add 43227 Cpt? (Important Note: In 2017 The Use Of 08139 Is Being Tracked By Cms To Determine Future Global Period Reimbursement For Global Periods): yes mobilization. Patient response to education:   Pt verbalized understanding Pt demonstrated skill Pt requires further education in this area   Yes Yes Reinforce     ASSESSMENT:    Conditions Requiring Skilled Therapeutic Intervention:    [x]Decreased strength     []Decreased ROM  [x]Decreased functional mobility  [x]Decreased balance   [x]Decreased endurance   []Decreased posture  []Decreased sensation  []Decreased coordination   []Decreased vision  [x]Decreased safety awareness   []Increased pain       Comments: The pt's SpO2 was 89% at rest on 7 lpm, desaturated at times to 85% on 7 lpm O2 with activity, recovered to 89%-90% within 1 minute of rest after activity, the pt required cues and increased time to perform activity as well as cues to breathe through her nose to benefit from HFNC. The pt was left resting comfortably in supine at the end of the session with call light in reach and all needs met. Treatment:  Patient practiced and was instructed in the following treatment:    Bed mobility training - pt given verbal and tactile cues to facilitate proper sequencing and safety during rolling and supine>sit as well as provided with physical assistance to complete task   Transfer training: verbal cues for hand placement during sit to stand transfer and assistance for anterior weight shift in order to facilitate initiation of the stand. Standing balance activities: performed static and dynamic standing balance activities with single and BUE support in order to progress safety with standing activities as well as improve independence with standing ADLs. Spirometer: cues for technique and education on importance of incentive spirometry to improve the pt's Sutter with functional mobility. Energy conservation: given cues for increased rest phase between activity and allowing for longer periods of time to complete a task.    Gait training: cues to observe energy conservation techniques previously Wound Evaluated By (Optional): Madhu Wade discussed as well as properly manage O2 line to increase independence and safety with task. Pt's/ family goals   1. Return home    Prognosis is good for reaching above PT goals. Patient and or family understand(s) diagnosis, prognosis, and plan of care. Yes    PHYSICAL THERAPY PLAN OF CARE:    PT POC is established based on physician order and patient diagnosis     Referring provider/PT Order:  Mona Rogel MD  Diagnosis:  Acute respiratory failure ([address] [J96.00]  Pelvic mass [R19.00]  Cavitary lesion of lung [J98.4]  Acute respiratory failure with hypoxia ([address] [J96.01]  COVID [U07.1]  COVID-19 virus infection [U07.1]  Specific instructions for next treatment:  Progress ambulation as able    Current Treatment Recommendations:     [x] Strengthening to improve independence with functional mobility   [] ROM to improve independence with functional mobility   [x] Balance Training to improve static/dynamic balance and to reduce fall risk  [x] Endurance Training to improve activity tolerance during functional mobility   [x] Transfer Training to improve safety and independence with all functional transfers   [x] Gait Training to improve gait mechanics, endurance and assess need for appropriate assistive device  [x] Stair Training in preparation for safe discharge home and/or into the community   [] Positioning to prevent skin breakdown and contractures  [x] Safety and Education Training   [] Patient/Caregiver Education   [] HEP  [] Other     PT long term treatment goals are located in above grid    Frequency of treatments: 2-5x/week x 1-2 weeks.     Time in  1535  Time out  1600    Total Treatment Time  10 minutes     Evaluation Time includes thorough review of current medical information, gathering information on past medical history/social history and prior level of function, completion of standardized testing/informal observation of tasks, assessment of data and education on plan of care and Wound Diameter In Cm(Optional): 0 goals.    CPT codes:  [x] Low Complexity PT evaluation 65672  [] Moderate Complexity PT evaluation 43598  [] High Complexity PT evaluation 12034  [] PT Re-evaluation 70506  [] Gait training 60365 0 minutes  [] Manual therapy 46239 0 minutes  [x] Therapeutic activities 99732 10 minutes  [] Therapeutic exercises 96730 0 minutes  [] Neuromuscular reeducation 50727 0 minutes     Rogelio Medina.  [id number] Wound Crusting?: clean Sutures?: intact

## 2022-11-04 LAB
ALBUMIN SERPL-MCNC: 2.9 G/DL (ref 3.5–5.2)
ALP BLD-CCNC: 54 U/L (ref 35–104)
ALT SERPL-CCNC: 21 U/L (ref 0–32)
ANION GAP SERPL CALCULATED.3IONS-SCNC: 9 MMOL/L (ref 7–16)
ANISOCYTOSIS: ABNORMAL
AST SERPL-CCNC: 46 U/L (ref 0–31)
ATYPICAL LYMPHOCYTE RELATIVE PERCENT: 2.6 % (ref 0–4)
BASOPHILS ABSOLUTE: 0 E9/L (ref 0–0.2)
BASOPHILS RELATIVE PERCENT: 0.2 % (ref 0–2)
BILIRUB SERPL-MCNC: 0.3 MG/DL (ref 0–1.2)
BUN BLDV-MCNC: 31 MG/DL (ref 6–23)
BURR CELLS: ABNORMAL
C-REACTIVE PROTEIN: 4.7 MG/DL (ref 0–0.4)
CALCIUM SERPL-MCNC: 8.1 MG/DL (ref 8.6–10.2)
CHLORIDE BLD-SCNC: 100 MMOL/L (ref 98–107)
CO2: 23 MMOL/L (ref 22–29)
CREAT SERPL-MCNC: 0.7 MG/DL (ref 0.5–1)
EOSINOPHILS ABSOLUTE: 0 E9/L (ref 0.05–0.5)
EOSINOPHILS RELATIVE PERCENT: 0 % (ref 0–6)
FOLATE: 17.9 NG/ML (ref 4.8–24.2)
GFR SERPL CREATININE-BSD FRML MDRD: >60 ML/MIN/1.73
GLUCOSE BLD-MCNC: 166 MG/DL (ref 74–99)
HBA1C MFR BLD: 6.7 % (ref 4–5.6)
HCT VFR BLD CALC: 44.1 % (ref 34–48)
HEMOGLOBIN: 14.9 G/DL (ref 11.5–15.5)
HYPOCHROMIA: ABNORMAL
IRON SATURATION: 31 % (ref 15–50)
IRON: 50 MCG/DL (ref 37–145)
LYMPHOCYTES ABSOLUTE: 0.35 E9/L (ref 1.5–4)
LYMPHOCYTES RELATIVE PERCENT: 4.3 % (ref 20–42)
MAGNESIUM: 1.9 MG/DL (ref 1.6–2.6)
MCH RBC QN AUTO: 29.6 PG (ref 26–35)
MCHC RBC AUTO-ENTMCNC: 33.8 % (ref 32–34.5)
MCV RBC AUTO: 87.7 FL (ref 80–99.9)
MONOCYTES ABSOLUTE: 0.3 E9/L (ref 0.1–0.95)
MONOCYTES RELATIVE PERCENT: 6.1 % (ref 2–12)
NEUTROPHILS ABSOLUTE: 4.35 E9/L (ref 1.8–7.3)
NEUTROPHILS RELATIVE PERCENT: 87 % (ref 43–80)
OVALOCYTES: ABNORMAL
PDW BLD-RTO: 14.9 FL (ref 11.5–15)
PLATELET # BLD: 154 E9/L (ref 130–450)
PMV BLD AUTO: 11.6 FL (ref 7–12)
POIKILOCYTES: ABNORMAL
POLYCHROMASIA: ABNORMAL
POTASSIUM SERPL-SCNC: 4 MMOL/L (ref 3.5–5)
PROCALCITONIN: 0.12 NG/ML (ref 0–0.08)
RBC # BLD: 5.03 E12/L (ref 3.5–5.5)
SCHISTOCYTES: ABNORMAL
SODIUM BLD-SCNC: 132 MMOL/L (ref 132–146)
SPHEROCYTES: ABNORMAL
TEAR DROP CELLS: ABNORMAL
TOTAL IRON BINDING CAPACITY: 163 MCG/DL (ref 250–450)
TOTAL PROTEIN: 5.8 G/DL (ref 6.4–8.3)
TSH SERPL DL<=0.05 MIU/L-ACNC: 0.36 UIU/ML (ref 0.27–4.2)
VITAMIN B-12: 371 PG/ML (ref 211–946)
WBC # BLD: 5 E9/L (ref 4.5–11.5)

## 2022-11-04 PROCEDURE — 6360000002 HC RX W HCPCS: Performed by: STUDENT IN AN ORGANIZED HEALTH CARE EDUCATION/TRAINING PROGRAM

## 2022-11-04 PROCEDURE — 6370000000 HC RX 637 (ALT 250 FOR IP): Performed by: NURSE PRACTITIONER

## 2022-11-04 PROCEDURE — 6360000002 HC RX W HCPCS: Performed by: NURSE PRACTITIONER

## 2022-11-04 PROCEDURE — 6360000002 HC RX W HCPCS: Performed by: INTERNAL MEDICINE

## 2022-11-04 PROCEDURE — 2140000000 HC CCU INTERMEDIATE R&B

## 2022-11-04 PROCEDURE — 80053 COMPREHEN METABOLIC PANEL: CPT

## 2022-11-04 PROCEDURE — 36415 COLL VENOUS BLD VENIPUNCTURE: CPT

## 2022-11-04 PROCEDURE — 82607 VITAMIN B-12: CPT

## 2022-11-04 PROCEDURE — 6370000000 HC RX 637 (ALT 250 FOR IP): Performed by: INTERNAL MEDICINE

## 2022-11-04 PROCEDURE — 83540 ASSAY OF IRON: CPT

## 2022-11-04 PROCEDURE — 83735 ASSAY OF MAGNESIUM: CPT

## 2022-11-04 PROCEDURE — 2580000003 HC RX 258: Performed by: STUDENT IN AN ORGANIZED HEALTH CARE EDUCATION/TRAINING PROGRAM

## 2022-11-04 PROCEDURE — 83550 IRON BINDING TEST: CPT

## 2022-11-04 PROCEDURE — 94640 AIRWAY INHALATION TREATMENT: CPT

## 2022-11-04 PROCEDURE — 2700000000 HC OXYGEN THERAPY PER DAY

## 2022-11-04 PROCEDURE — 86140 C-REACTIVE PROTEIN: CPT

## 2022-11-04 PROCEDURE — 83036 HEMOGLOBIN GLYCOSYLATED A1C: CPT

## 2022-11-04 PROCEDURE — 84145 PROCALCITONIN (PCT): CPT

## 2022-11-04 PROCEDURE — 84443 ASSAY THYROID STIM HORMONE: CPT

## 2022-11-04 PROCEDURE — 85025 COMPLETE CBC W/AUTO DIFF WBC: CPT

## 2022-11-04 PROCEDURE — 82746 ASSAY OF FOLIC ACID SERUM: CPT

## 2022-11-04 RX ORDER — LEVOFLOXACIN 5 MG/ML
750 INJECTION, SOLUTION INTRAVENOUS EVERY 24 HOURS
Status: DISCONTINUED | OUTPATIENT
Start: 2022-11-04 | End: 2022-11-07

## 2022-11-04 RX ORDER — MAGNESIUM SULFATE IN WATER 40 MG/ML
2000 INJECTION, SOLUTION INTRAVENOUS ONCE
Status: COMPLETED | OUTPATIENT
Start: 2022-11-04 | End: 2022-11-04

## 2022-11-04 RX ORDER — CYANOCOBALAMIN 1000 UG/ML
1000 INJECTION, SOLUTION INTRAMUSCULAR; SUBCUTANEOUS DAILY
Status: COMPLETED | OUTPATIENT
Start: 2022-11-04 | End: 2022-11-06

## 2022-11-04 RX ADMIN — DEXAMETHASONE SODIUM PHOSPHATE 10 MG: 4 INJECTION, SOLUTION INTRAMUSCULAR; INTRAVENOUS at 08:10

## 2022-11-04 RX ADMIN — BUDESONIDE 250 MCG: 0.25 SUSPENSION RESPIRATORY (INHALATION) at 08:53

## 2022-11-04 RX ADMIN — BENZONATATE 100 MG: 100 CAPSULE ORAL at 13:11

## 2022-11-04 RX ADMIN — GUAIFENESIN 400 MG: 400 TABLET ORAL at 08:10

## 2022-11-04 RX ADMIN — Medication 50 MG: at 08:10

## 2022-11-04 RX ADMIN — IPRATROPIUM BROMIDE AND ALBUTEROL SULFATE 1 AMPULE: .5; 2.5 SOLUTION RESPIRATORY (INHALATION) at 19:58

## 2022-11-04 RX ADMIN — IPRATROPIUM BROMIDE AND ALBUTEROL SULFATE 1 AMPULE: .5; 2.5 SOLUTION RESPIRATORY (INHALATION) at 16:33

## 2022-11-04 RX ADMIN — ENOXAPARIN SODIUM 40 MG: 100 INJECTION SUBCUTANEOUS at 21:00

## 2022-11-04 RX ADMIN — IPRATROPIUM BROMIDE AND ALBUTEROL SULFATE 1 AMPULE: .5; 2.5 SOLUTION RESPIRATORY (INHALATION) at 08:53

## 2022-11-04 RX ADMIN — OXYCODONE HYDROCHLORIDE AND ACETAMINOPHEN 1000 MG: 500 TABLET ORAL at 08:10

## 2022-11-04 RX ADMIN — ENOXAPARIN SODIUM 40 MG: 100 INJECTION SUBCUTANEOUS at 08:09

## 2022-11-04 RX ADMIN — LEVOFLOXACIN 750 MG: 5 INJECTION, SOLUTION INTRAVENOUS at 16:14

## 2022-11-04 RX ADMIN — DEXAMETHASONE SODIUM PHOSPHATE 10 MG: 4 INJECTION, SOLUTION INTRAMUSCULAR; INTRAVENOUS at 21:00

## 2022-11-04 RX ADMIN — Medication 10 ML: at 08:10

## 2022-11-04 RX ADMIN — Medication 10 ML: at 21:37

## 2022-11-04 RX ADMIN — GUAIFENESIN 400 MG: 400 TABLET ORAL at 13:11

## 2022-11-04 RX ADMIN — ARFORMOTEROL TARTRATE 15 MCG: 15 SOLUTION RESPIRATORY (INHALATION) at 19:58

## 2022-11-04 RX ADMIN — ARFORMOTEROL TARTRATE 15 MCG: 15 SOLUTION RESPIRATORY (INHALATION) at 08:53

## 2022-11-04 RX ADMIN — MAGNESIUM SULFATE HEPTAHYDRATE 2000 MG: 40 INJECTION, SOLUTION INTRAVENOUS at 11:01

## 2022-11-04 RX ADMIN — Medication 5 MG: at 20:59

## 2022-11-04 RX ADMIN — BUDESONIDE 250 MCG: 0.25 SUSPENSION RESPIRATORY (INHALATION) at 19:58

## 2022-11-04 RX ADMIN — GUAIFENESIN 400 MG: 400 TABLET ORAL at 20:59

## 2022-11-04 RX ADMIN — CYANOCOBALAMIN 1000 MCG: 1000 INJECTION, SOLUTION INTRAMUSCULAR; SUBCUTANEOUS at 13:11

## 2022-11-04 RX ADMIN — BENZONATATE 100 MG: 100 CAPSULE ORAL at 21:00

## 2022-11-04 RX ADMIN — Medication 2000 UNITS: at 08:10

## 2022-11-04 RX ADMIN — BENZONATATE 100 MG: 100 CAPSULE ORAL at 08:10

## 2022-11-04 RX ADMIN — FUROSEMIDE 20 MG: 10 INJECTION, SOLUTION INTRAMUSCULAR; INTRAVENOUS at 17:14

## 2022-11-04 RX ADMIN — FUROSEMIDE 20 MG: 10 INJECTION, SOLUTION INTRAMUSCULAR; INTRAVENOUS at 08:10

## 2022-11-04 NOTE — PROGRESS NOTES
Heike Coles M.D.,[facility]  Mahsa Bennett D.O., F.A.C.O.I., Rony Moraes M.D. Michael Haywood M.D. Karthikeyan Saini D.O. Daily Pulmonary Progress Note    Patient:  Liz Varelaw 78 y.o. female MRN: [id number]     Date of Service: 11/4/2022      Synopsis     We are following patient for covid pneumonia    \"CC\" fall     Code status: full       Subjective      Patient was seen and examined. feeling ok. Continues with hacking moist cough. Not expectorating much. No fevers. Received tocilizumab yesterday x 1. Oxygen 8 L HF. Review of Systems:  Constitutional: Denies fever, weight loss, night sweats, and fatigue  Skin: Denies pigmentation, dark lesions, and rashes   HEENT: Denies hearing loss, tinnitus, ear drainage, epistaxis, sore throat, and hoarseness. Cardiovascular: Denies palpitations, chest pain, and chest pressure. Respiratory:  hacking cough  Gastrointestinal: Denies nausea, vomiting, poor appetite, diarrhea, heartburn or reflux  Genitourinary: Denies dysuria, frequency, urgency or hematuria  Musculoskeletal: Denies myalgias, muscle weakness, and bone pain  Neurological: Denies dizziness, vertigo, headache, and focal weakness  Psychological: Denies anxiety and depression  Endocrine: Denies heat intolerance and cold intolerance  Hematopoietic/Lymphatic: Denies bleeding problems and blood transfusions    24-hour events:   Toci x 1    Objective   Vitals: /67   Pulse 60   Temp 97.9 °F (36.6 °C) (Oral)   Resp 18   Ht 5' 7\" (1.702 m)   Wt 188 lb 4.8 oz (85.4 kg)   SpO2 93%   BMI 29.49 kg/m²     I/O:    Intake/Output Summary (Last 24 hours) at 11/4/2022 1315  Last data filed at 11/4/2022 5205  Gross per 24 hour   Intake --   Output 1100 ml   Net -1100 ml                        CURRENT MEDS :  Scheduled Meds:   cyanocobalamin  1,000 mcg IntraMUSCular Daily    guaiFENesin  400 mg Oral TID    benzonatate  100 mg Oral TID    melatonin  5 mg Oral Nightly    vitamin D  50,000 Units Oral Weekly    vitamin D  2,000 Units Oral Daily    ascorbic acid  1,000 mg Oral Daily    zinc sulfate  50 mg Oral Daily    dexamethasone  10 mg IntraVENous Q12H    ipratropium-albuterol  1 ampule Inhalation Q4H WA    sodium chloride flush  5-40 mL IntraVENous 2 times per day    enoxaparin  40 mg SubCUTAneous BID    furosemide  20 mg IntraVENous BID    Arformoterol Tartrate  15 mcg Nebulization BID    budesonide  250 mcg Nebulization BID       Physical Exam:  General Appearance: appears comfortable in no acute distress. HEENT: Normocephalic atraumatic without obvious abnormality   Neck: Lips, mucosa, and tongue normal.  Supple, symmetrical, trachea midline, no adenopathy;thyroid:  no enlargement/tenderness/nodules or JVD. Lung: Breath sounds  few crackles bibasilar  Respirations   unlabored. Symmetrical expansion. Heart: RRR, normal S1, S2. No MRG  Abdomen: Soft, NT, ND. BS present x 4 quadrants. No bruit or organomegaly. Extremities: Pedal pulses 2+ symmetric b/l. Extremities normal, no cyanosis, clubbing, or edema. Musculokeletal: No joint swelling, no muscle tenderness. ROM normal in all joints of extremities. Neurologic: Mental status: Alert and Oriented X3 . Pertinent/ New Labs and Imaging Studies     Imaging Personally Reviewed:  11/2/22 cxr      FINDINGS:   Mild hazy opacity in the lower lungs. The heart is not enlarged. No   pneumothorax. Minimal blunting of the left costophrenic angle. Impression   Mild bibasilar infiltrates slightly worse on the left. CTA chest  FINDINGS:   Pulmonary Arteries: Upper normal in caliber. No PE. Lungs and pleura: Bilateral lower lobe and perihilar airspace opacities   compatible with pulmonary edema, or less likely, pneumonia. Bilateral upper   lobe scattered ground-glass opacities. No pleural effusion or pneumothorax. Heart and mediastinum: Borderline cardiomegaly and with mild left atrial   enlargement.   No pericardial effusion. Clustered subcarinal lymph nodes   measuring 1.7 cm in AP dimension. Multiple additional mildly enlarged   mediastinal lymph nodes and bilateral hilar nodes. Left axillary 1.9 cm node   versus epithelial inclusion cyst.  Normal right axilla. Atherosclerotic   thoracic aorta which is normal in caliber. Upper abdomen: Please see separate report       Bones: No acute osseous abnormality. Impression   1. No PE.       2.  Bilateral widespread airspace opacities compatible pulmonary edema, or   less likely pneumonia. Left atrial enlargement. 3.  Mediastinal and bilateral hilar lymph node enlargement, nonspecific. RECOMMENDATIONS:   Unavailable                  Echo:  Pending            Labs:  Lab Results   Component Value Date/Time    WBC 5.0 11/04/2022 05:03 AM    HGB 14.9 11/04/2022 05:03 AM    HCT 44.1 11/04/2022 05:03 AM    MCV 87.7 11/04/2022 05:03 AM    MCH 29.6 11/04/2022 05:03 AM    MCHC 33.8 11/04/2022 05:03 AM    RDW 14.9 11/04/2022 05:03 AM     11/04/2022 05:03 AM    MPV 11.6 11/04/2022 05:03 AM     Lab Results   Component Value Date/Time     11/04/2022 05:03 AM    K 4.0 11/04/2022 05:03 AM    K 4.2 11/03/2022 05:36 AM     11/04/2022 05:03 AM    CO2 23 11/04/2022 05:03 AM    BUN 31 11/04/2022 05:03 AM    CREATININE 0.7 11/04/2022 05:03 AM    LABALBU 2.9 11/04/2022 05:03 AM    CALCIUM 8.1 11/04/2022 05:03 AM    GFRAA >60 09/13/2017 03:04 PM    LABGLOM >60 11/04/2022 05:03 AM     No results found for: PROTIME, INR  Recent Labs     11/02/22  1012   PROBNP 685*     Recent Labs     11/04/22  0503   PROCAL 0.12*     This SmartLink has not been configured with any valid records. Micro:  No results for input(s): CULTRESP in the last 72 hours. No results for input(s): LABGRAM in the last 72 hours. No results for input(s): LEGUR in the last 72 hours. No results for input(s): STREPNEUMAGU in the last 72 hours.   No results for input(s): LP1UAG in the last 72 hours. SARS-COV-2 biomarkers    Recent Labs     11/02/22  1012 11/02/22  1800 11/03/22  0536 11/04/22  0503   CRP  --  10.8*  --  4.7*   PROCAL  --  0.20*  --  0.12*   DDIMER  --   --  674  --    AST 69*  --  56* 46*   ALT 26  --  22 21      Lab Results   Component Value Date/Time    VITD25 11 (L) 11/03/2022 05:36 AM        Assessment:    COVID-19 pneumonia, unvaccinated  Acute respiratory failure with hypoxia  Community-acquired pneumonia  Pelvic mass  Unintentional weight loss 68 pounds over 1 year  Ongoing nicotine dependence up to 1 pack/day for 63 years  Fall at home hit head, laceration left side forehead and left arm      Plan:   Oxygen therapy 8L high flow wean to keep greater than 92%  Elevated inflammatory markers, fever at home. received tocilizumab x 1, does not meet criteria for remdesivir. Increase dexamethasone 10 mg IV twice daily. vitamins-C, D, and zinc  Levaquin 750 mg daily x 7 days for CAP coverage  Bronchodilators via nebulizer with Respirgard to prevent droplet spread. Brovana and budesonide,  duo nebs. Continue guaifenesin TID  Chest imaging reviewed 11/2/22  Incentive spirometer, lung recruitment maneuvers  2D echo pending  Await final cultures, trend inflammatory markers. Monitor off antibiotics. PT/OT  DVT, GI prophylaxis  Tobacco cessation counseling     This plan of care was reviewed in collaboration with Dr. Clance Mcburney  Electronically signed by CHRISTINA Barajas CNP on 11/4/2022 at 1:15 PM    Addendum:      I personally saw, examined and provided care for the patient. Radiographs, labs and medication list were reviewed by me independently. Patient has a productive cough with thick dark yellow phlegm. On exam has bilateral coarse rhonchi currently on 8 L high flow nasal cannula saturating between 88 to 94%. In addition to her current regimen for her COVID I will go ahead and start her on Levaquin for at least 7 days.   Patient is very hard of hearing but was able to communicate an emphasis on the importance of her using the incentive spirometry. I spoke with bedside nursing, therapists and consultants. The case was discussed in detail and plans for care were established. Review of CNP documentation was conducted and revisions were made as appropriate. I agree with the above documented exam, problem list and plan of care.      Anita Sewell MD

## 2022-11-04 NOTE — PROGRESS NOTES
Occupational Therapy  OT SESSION ATTEMPT     Date:2022  Patient Name: Becky Bryant  MRN: [id number]  : 1945  Room: [address] [place] [address]     Attempted OT session this date:    [x] unavailable due to other medical staff currently with pt   [] on hold per nursing staff   [] on hold per nursing staff secondary to lab / radiology results    [] declined Occupational Therapy  this date due to ___. Benefits of participation in therapy reviewed with pt.    [] off unit   [] Other:     Will reattempt OT evaluation at a later time.     Lizeth Simms OTR/L #[id number]

## 2022-11-04 NOTE — PROGRESS NOTES
Hospitalist Progress Note            Patient: Lauri Mcneil Age: 78 y.o.   DOA: 11/2/2022 Admit Dx / CC: Acute respiratory failure (HCC) [J96.00]  Pelvic mass [R19.00]  Cavitary lesion of lung [J98.4]  Acute respiratory failure with hypoxia ([address] [J96.01]  COVID [U07.1]  COVID-19 virus infection [U07.1]   LOS:  LOS: 2 days      Assessment/ Plan:     Generalized weakness/Physical deconditioning with falls 2/2 Acute hypoxic respiratory failure 2/2 COVID-19 in pneumonia, r/o CHF. Also suspect underlying undiagnosed COPD/Longstanding active smoker  Counseld on smoking cessation  Cont O2 supplement and wean off as tolerated, down to 6 L, Keep SpO2 >=88%  Cont IV decadron  S/p dose of Toci 11/3  On IV lasix  Nebs  Bronchial hygiene  IV lasix  Echo pend  Encouraged IS and doing well  Recommend OP PFTs  Pulmo following  PT/OT/Increase acitivty    Obesity, BMI 29    Mildly elevated troponin suspect 2/2 demand  No chest pain. Trend flat and not consistent with NSTEMI    Urinary incontinence with retention ? overflow incontinence   Bladder scans    Left forehead and left upper extremity laceration 2/2 fall  Received tetanus vaccine  Wound care    Possible adrenal adenoma versus adrenal hyperplasia versus adrenal hemorrhage by CT scan report  Recommend OP w/u for Cushings syndrome    Very [place]  She has right-sided hearing aid    Abnormally thickened endometrium  OP f/u with GYN    Vitamin D deficiency  Started replacement    DVT ppx:  Lovenox  Code status: Full code    Plan discharge to SNF pend coarse    Plan of care discussed with: patient and bedside RN, JOSE    CT:  Impression   Enhancing area visualized within the endometrial cavity with surrounding low   attenuation worrisome for a mass would recommend further evaluation with   transvaginal pelvic ultrasound. Focal aneurysmal dilatation of the distal abdominal aorta and common iliac   artery.   Abdominal aorta maximal transverse diameters of 3.5 x 3. 5 cm. Nodularity visualized in the left adrenal gland, differential diagnosis would   include adrenal adenoma, adrenal hyperplasia less likely adrenal hemorrhage. Extensive degenerative bone changes are seen. Small type 1 hiatus hernia. 3.8 cm cavitary lesion visualized in the inferolateral left lower lobe. Prominent chronic interstitial and bronchovascular lung changes is seen. TV US:  Impression   Abnormally thickened endometrium. The apparent enhancing intracavitary mass,   as seen on the prior CT, is not evident by transabdominal sonography. .  Ob   consultation and histologic sampling are recommended.      Patient Active Problem List   Diagnosis    Acute respiratory failure (HCC)    Pneumonia due to COVID-19 virus    Tobacco abuse    Vitamin D deficiency    [place] (hard of hearing)    Noncompliance        Medications:  Reviewed    Infusion Medications    sodium chloride       Scheduled Medications    cyanocobalamin  1,000 mcg IntraMUSCular Daily    magnesium sulfate  2,000 mg IntraVENous Once    guaiFENesin  400 mg Oral TID    benzonatate  100 mg Oral TID    melatonin  5 mg Oral Nightly    vitamin D  50,000 Units Oral Weekly    vitamin D  2,000 Units Oral Daily    ascorbic acid  1,000 mg Oral Daily    zinc sulfate  50 mg Oral Daily    dexamethasone  10 mg IntraVENous Q12H    ipratropium-albuterol  1 ampule Inhalation Q4H WA    sodium chloride flush  5-40 mL IntraVENous 2 times per day    enoxaparin  40 mg SubCUTAneous BID    furosemide  20 mg IntraVENous BID    Arformoterol Tartrate  15 mcg Nebulization BID    budesonide  250 mcg Nebulization BID     PRN Meds: acetaminophen **OR** [DISCONTINUED] acetaminophen, melatonin, albuterol sulfate HFA, sodium chloride flush, sodium chloride, ondansetron **OR** ondansetron, polyethylene glycol, guaiFENesin-dextromethorphan    I/O    Intake/Output Summary (Last 24 hours) at 11/4/2022 1256  Last data filed at 11/4/2022 0508  Gross per 24 hour   Intake --   Output 1100 ml   Net -1100 ml       Labs:   Recent Labs     11/02/22  1012 11/03/22  0536 11/04/22  0503   WBC 9.8 5.5 5.0   HGB 15.6* 14.6 14.9   HCT 45.7 44.8 44.1   * 114* 154       Recent Labs     11/02/22  1012 11/03/22  0536 11/04/22  0503    135 132   K 3.8 4.2 4.0   CL 99 101 100   CO2 21* 22 23   BUN 21 28* 31*   CREATININE 0.7 0.7 0.7   CALCIUM 8.0* 8.1* 8.1*       Recent Labs     11/02/22  1012 11/03/22  0536 11/04/22  0503   PROT 6.0* 5.8* 5.8*   ALKPHOS 52 48 54   ALT 26 22 21   AST 69* 56* 46*   BILITOT 0.5 0.3 0.3       No results for input(s): INR in the last 72 hours. No results for input(s): Bull Gastelum in the last 72 hours. Chronic labs:  Lab Results   Component Value Date    CHOL 230 (H) 09/13/2017    TRIG 273 (H) 09/13/2017    HDL 37 09/13/2017    LDLCALC 138 (H) 09/13/2017    TSH 0.361 11/04/2022    LABA1C 6.7 (H) 11/04/2022       Radiology:  Imaging studies reviewed today. Subjective:     Lauri Safer feeling better, but her bottom hurts from being in bed and wants to get up in chair    Objective:     Physical Exam:   /67   Pulse 60   Temp 97.9 °F (36.6 °C) (Oral)   Resp 18   Ht 5' 7\" (1.702 m)   Wt 188 lb 4.8 oz (85.4 kg)   SpO2 93%   BMI 29.49 kg/m²     General appearance:in no distress, up in bed  Lungs: diminished in bases bilaterally, no wheezing or crackles   Heart: Regular rate and rhythm, S1, S2 normal   Abdomen: Soft, non-tender and not-distended.  Bowel sounds normal.   Extremities: no edema   Neurologic: Grossly normal and non focal, CN II-XII sig for [facility]      Figueroa Berry MD   Hospitalist Service   11/04/22 12:56 PM

## 2022-11-05 ENCOUNTER — APPOINTMENT (OUTPATIENT)
Dept: GENERAL RADIOLOGY | Age: 77
DRG: 177 | End: 2022-11-05
Payer: COMMERCIAL

## 2022-11-05 LAB
ALBUMIN SERPL-MCNC: 3 G/DL (ref 3.5–5.2)
ALP BLD-CCNC: 58 U/L (ref 35–104)
ALT SERPL-CCNC: 21 U/L (ref 0–32)
ANION GAP SERPL CALCULATED.3IONS-SCNC: 13 MMOL/L (ref 7–16)
AST SERPL-CCNC: 37 U/L (ref 0–31)
BASOPHILS ABSOLUTE: 0 E9/L (ref 0–0.2)
BASOPHILS RELATIVE PERCENT: 0.2 % (ref 0–2)
BILIRUB SERPL-MCNC: 0.3 MG/DL (ref 0–1.2)
BUN BLDV-MCNC: 32 MG/DL (ref 6–23)
CALCIUM SERPL-MCNC: 8.4 MG/DL (ref 8.6–10.2)
CHLORIDE BLD-SCNC: 101 MMOL/L (ref 98–107)
CO2: 22 MMOL/L (ref 22–29)
CREAT SERPL-MCNC: 0.7 MG/DL (ref 0.5–1)
EOSINOPHILS ABSOLUTE: 0 E9/L (ref 0.05–0.5)
EOSINOPHILS RELATIVE PERCENT: 0.2 % (ref 0–6)
GFR SERPL CREATININE-BSD FRML MDRD: >60 ML/MIN/1.73
GLUCOSE BLD-MCNC: 169 MG/DL (ref 74–99)
HCT VFR BLD CALC: 45 % (ref 34–48)
HEMOGLOBIN: 14.8 G/DL (ref 11.5–15.5)
LYMPHOCYTES ABSOLUTE: 0.35 E9/L (ref 1.5–4)
LYMPHOCYTES RELATIVE PERCENT: 6.1 % (ref 20–42)
MCH RBC QN AUTO: 29.3 PG (ref 26–35)
MCHC RBC AUTO-ENTMCNC: 32.9 % (ref 32–34.5)
MCV RBC AUTO: 89.1 FL (ref 80–99.9)
MONOCYTES ABSOLUTE: 0.3 E9/L (ref 0.1–0.95)
MONOCYTES RELATIVE PERCENT: 5.2 % (ref 2–12)
MRSA CULTURE ONLY: NORMAL
NEUTROPHILS ABSOLUTE: 5.25 E9/L (ref 1.8–7.3)
NEUTROPHILS RELATIVE PERCENT: 88.7 % (ref 43–80)
OVALOCYTES: ABNORMAL
PDW BLD-RTO: 14.8 FL (ref 11.5–15)
PLATELET # BLD: 182 E9/L (ref 130–450)
PMV BLD AUTO: 11 FL (ref 7–12)
POIKILOCYTES: ABNORMAL
POTASSIUM SERPL-SCNC: 4.2 MMOL/L (ref 3.5–5)
RBC # BLD: 5.05 E12/L (ref 3.5–5.5)
SMUDGE CELLS: ABNORMAL
SODIUM BLD-SCNC: 136 MMOL/L (ref 132–146)
TOTAL PROTEIN: 5.6 G/DL (ref 6.4–8.3)
WBC # BLD: 5.9 E9/L (ref 4.5–11.5)

## 2022-11-05 PROCEDURE — 6360000002 HC RX W HCPCS: Performed by: NURSE PRACTITIONER

## 2022-11-05 PROCEDURE — 6370000000 HC RX 637 (ALT 250 FOR IP): Performed by: NURSE PRACTITIONER

## 2022-11-05 PROCEDURE — 6360000002 HC RX W HCPCS: Performed by: STUDENT IN AN ORGANIZED HEALTH CARE EDUCATION/TRAINING PROGRAM

## 2022-11-05 PROCEDURE — 2580000003 HC RX 258: Performed by: STUDENT IN AN ORGANIZED HEALTH CARE EDUCATION/TRAINING PROGRAM

## 2022-11-05 PROCEDURE — 2700000000 HC OXYGEN THERAPY PER DAY

## 2022-11-05 PROCEDURE — 71045 X-RAY EXAM CHEST 1 VIEW: CPT

## 2022-11-05 PROCEDURE — 6360000002 HC RX W HCPCS: Performed by: INTERNAL MEDICINE

## 2022-11-05 PROCEDURE — 94640 AIRWAY INHALATION TREATMENT: CPT

## 2022-11-05 PROCEDURE — 2140000000 HC CCU INTERMEDIATE R&B

## 2022-11-05 PROCEDURE — 6370000000 HC RX 637 (ALT 250 FOR IP): Performed by: INTERNAL MEDICINE

## 2022-11-05 PROCEDURE — 80053 COMPREHEN METABOLIC PANEL: CPT

## 2022-11-05 PROCEDURE — 85025 COMPLETE CBC W/AUTO DIFF WBC: CPT

## 2022-11-05 PROCEDURE — 36415 COLL VENOUS BLD VENIPUNCTURE: CPT

## 2022-11-05 RX ORDER — FUROSEMIDE 20 MG/1
20 TABLET ORAL DAILY
Status: DISCONTINUED | OUTPATIENT
Start: 2022-11-05 | End: 2022-11-10 | Stop reason: HOSPADM

## 2022-11-05 RX ORDER — MULTIVITAMIN WITH IRON
1 TABLET ORAL DAILY
Status: DISCONTINUED | OUTPATIENT
Start: 2022-11-05 | End: 2022-11-10 | Stop reason: HOSPADM

## 2022-11-05 RX ORDER — LACTOBACILLUS RHAMNOSUS GG 10B CELL
1 CAPSULE ORAL 2 TIMES DAILY
Status: DISCONTINUED | OUTPATIENT
Start: 2022-11-05 | End: 2022-11-05

## 2022-11-05 RX ORDER — LACTOBACILLUS RHAMNOSUS GG 10B CELL
2 CAPSULE ORAL 2 TIMES DAILY
Status: DISCONTINUED | OUTPATIENT
Start: 2022-11-05 | End: 2022-11-10 | Stop reason: HOSPADM

## 2022-11-05 RX ADMIN — LEVOFLOXACIN 750 MG: 5 INJECTION, SOLUTION INTRAVENOUS at 17:18

## 2022-11-05 RX ADMIN — BUDESONIDE 250 MCG: 0.25 SUSPENSION RESPIRATORY (INHALATION) at 21:32

## 2022-11-05 RX ADMIN — FUROSEMIDE 20 MG: 20 TABLET ORAL at 10:56

## 2022-11-05 RX ADMIN — Medication 50 MG: at 10:56

## 2022-11-05 RX ADMIN — GUAIFENESIN 400 MG: 400 TABLET ORAL at 10:57

## 2022-11-05 RX ADMIN — BENZONATATE 100 MG: 100 CAPSULE ORAL at 22:06

## 2022-11-05 RX ADMIN — CYANOCOBALAMIN 1000 MCG: 1000 INJECTION, SOLUTION INTRAMUSCULAR; SUBCUTANEOUS at 10:57

## 2022-11-05 RX ADMIN — Medication 2000 UNITS: at 10:56

## 2022-11-05 RX ADMIN — Medication 10 ML: at 21:00

## 2022-11-05 RX ADMIN — ARFORMOTEROL TARTRATE 15 MCG: 15 SOLUTION RESPIRATORY (INHALATION) at 21:33

## 2022-11-05 RX ADMIN — IPRATROPIUM BROMIDE AND ALBUTEROL SULFATE 1 AMPULE: .5; 2.5 SOLUTION RESPIRATORY (INHALATION) at 21:32

## 2022-11-05 RX ADMIN — MULTIVITAMIN TABLET 1 TABLET: TABLET at 10:57

## 2022-11-05 RX ADMIN — ENOXAPARIN SODIUM 40 MG: 100 INJECTION SUBCUTANEOUS at 10:53

## 2022-11-05 RX ADMIN — OXYCODONE HYDROCHLORIDE AND ACETAMINOPHEN 1000 MG: 500 TABLET ORAL at 10:57

## 2022-11-05 RX ADMIN — ARFORMOTEROL TARTRATE 15 MCG: 15 SOLUTION RESPIRATORY (INHALATION) at 08:48

## 2022-11-05 RX ADMIN — Medication 2 CAPSULE: at 22:06

## 2022-11-05 RX ADMIN — GUAIFENESIN 400 MG: 400 TABLET ORAL at 22:06

## 2022-11-05 RX ADMIN — IPRATROPIUM BROMIDE AND ALBUTEROL SULFATE 1 AMPULE: .5; 2.5 SOLUTION RESPIRATORY (INHALATION) at 12:53

## 2022-11-05 RX ADMIN — DEXAMETHASONE SODIUM PHOSPHATE 10 MG: 4 INJECTION, SOLUTION INTRAMUSCULAR; INTRAVENOUS at 10:54

## 2022-11-05 RX ADMIN — GUAIFENESIN 400 MG: 400 TABLET ORAL at 14:47

## 2022-11-05 RX ADMIN — Medication 5 MG: at 22:05

## 2022-11-05 RX ADMIN — DEXAMETHASONE SODIUM PHOSPHATE 10 MG: 4 INJECTION, SOLUTION INTRAMUSCULAR; INTRAVENOUS at 22:05

## 2022-11-05 RX ADMIN — IPRATROPIUM BROMIDE AND ALBUTEROL SULFATE 1 AMPULE: .5; 2.5 SOLUTION RESPIRATORY (INHALATION) at 08:48

## 2022-11-05 RX ADMIN — ENOXAPARIN SODIUM 40 MG: 100 INJECTION SUBCUTANEOUS at 22:05

## 2022-11-05 RX ADMIN — Medication 10 ML: at 10:57

## 2022-11-05 RX ADMIN — BENZONATATE 100 MG: 100 CAPSULE ORAL at 10:57

## 2022-11-05 RX ADMIN — BUDESONIDE 250 MCG: 0.25 SUSPENSION RESPIRATORY (INHALATION) at 08:48

## 2022-11-05 RX ADMIN — BENZONATATE 100 MG: 100 CAPSULE ORAL at 14:47

## 2022-11-05 NOTE — PROGRESS NOTES
Hospitalist Progress Note            Patient: Liliana Dsouza Age: 78 y.o.   DOA: 11/2/2022 Admit Dx / CC: Acute respiratory failure (HCC) [J96.00]  Pelvic mass [R19.00]  Cavitary lesion of lung [J98.4]  Acute respiratory failure with hypoxia (HCC) [J96.01]  COVID [U07.1]  COVID-19 virus infection [U07.1]   LOS:  LOS: 3 days      Assessment/ Plan:     Ms. Liliana Dsouza, a 78y.o. year old female  who  has a past medical history of Fluid retention in legs. Patient presented to ED for evaluation of frequent falls for about a week, found to have COVID 19 requiring O2 supplementation and is being followed by PCCM. Generalized weakness/Physical deconditioning with falls 2/2 Acute hypoxic respiratory failure 2/2 COVID-19 in pneumonia, r/o CHF. Also suspect underlying undiagnosed COPD/Longstanding active smoker, improving  Counseld on smoking cessation  Cont O2 supplement and wean off as tolerated, down to 4 L, Keep SpO2 >=88% (d/w nursing to f/u and recheck SpO2 after dialing down to 4 L)  Cont IV decadron  Started on IV levoquin  Add probiotic  S/p dose of Toci 11/3  Change IV lasix to 20 mg daily  Nebs  Bronchial hygiene  IV lasix  Echo pend (can be completed as OP)  Encouraged IS and doing well  Recommend OP PFTs  Pulmo following  PT/OT/Increase acitivty    Obesity, BMI 29    Mildly elevated troponin suspect 2/2 demand  No chest pain. Trend flat and not consistent with NSTEMI    Urinary incontinence with retention ? overflow incontinence   Bladder scans    Left forehead and left upper extremity laceration 2/2 fall  Received tetanus vaccine  Wound care    Possible adrenal adenoma versus adrenal hyperplasia versus adrenal hemorrhage by CT scan report  Recommend OP w/u for Cushings syndrome    Very [place]  She has right-sided hearing aid    Abnormally thickened endometrium  OP f/u with GYN    Vitamin D deficiency  Started replacement    Suspect ROSE  SpO2 drops during sleep  Recommend OP sleep study (ordered)    DVT ppx:  Lovenox  Code status: Full code    Plan discharge to SNF in ~2 days    Plan of care discussed with: patient and bedside RNJOSE    CT:  Impression   Enhancing area visualized within the endometrial cavity with surrounding low   attenuation worrisome for a mass would recommend further evaluation with   transvaginal pelvic ultrasound. Focal aneurysmal dilatation of the distal abdominal aorta and common iliac   artery. Abdominal aorta maximal transverse diameters of 3.5 x 3.5 cm. Nodularity visualized in the left adrenal gland, differential diagnosis would   include adrenal adenoma, adrenal hyperplasia less likely adrenal hemorrhage. Extensive degenerative bone changes are seen. Small type 1 hiatus hernia. 3.8 cm cavitary lesion visualized in the inferolateral left lower lobe. Prominent chronic interstitial and bronchovascular lung changes is seen. TV US:  Impression   Abnormally thickened endometrium. The apparent enhancing intracavitary mass,   as seen on the prior CT, is not evident by transabdominal sonography. .  Ob   consultation and histologic sampling are recommended.      Patient Active Problem List   Diagnosis    Acute respiratory failure (HCC)    Pneumonia due to COVID-19 virus    Tobacco abuse    Vitamin D deficiency    [place] (hard of hearing)    Noncompliance        Medications:  Reviewed    Infusion Medications    sodium chloride       Scheduled Medications    furosemide  20 mg Oral Daily    multivitamin  1 tablet Oral Daily    lactobacillus  2 capsule Oral BID    cyanocobalamin  1,000 mcg IntraMUSCular Daily    levofloxacin  750 mg IntraVENous Q24H    guaiFENesin  400 mg Oral TID    benzonatate  100 mg Oral TID    melatonin  5 mg Oral Nightly    vitamin D  50,000 Units Oral Weekly    vitamin D  2,000 Units Oral Daily    ascorbic acid  1,000 mg Oral Daily    zinc sulfate  50 mg Oral Daily    dexamethasone  10 mg IntraVENous Q12H ipratropium-albuterol  1 ampule Inhalation Q4H WA    sodium chloride flush  5-40 mL IntraVENous 2 times per day    enoxaparin  40 mg SubCUTAneous BID    Arformoterol Tartrate  15 mcg Nebulization BID    budesonide  250 mcg Nebulization BID     PRN Meds: acetaminophen **OR** [DISCONTINUED] acetaminophen, melatonin, albuterol sulfate HFA, sodium chloride flush, sodium chloride, ondansetron **OR** ondansetron, polyethylene glycol, guaiFENesin-dextromethorphan    I/O    Intake/Output Summary (Last 24 hours) at 11/5/2022 1431  Last data filed at 11/5/2022 1007  Gross per 24 hour   Intake 240 ml   Output 2750 ml   Net -2510 ml       Labs:   Recent Labs     11/03/22  0536 11/04/22  0503 11/05/22  0548   WBC 5.5 5.0 5.9   HGB 14.6 14.9 14.8   HCT 44.8 44.1 45.0   * 154 182       Recent Labs     11/03/22  0536 11/04/22  0503 11/05/22  0548    132 136   K 4.2 4.0 4.2    100 101   CO2 22 23 22   BUN 28* 31* 32*   CREATININE 0.7 0.7 0.7   CALCIUM 8.1* 8.1* 8.4*       Recent Labs     11/03/22  0536 11/04/22  0503 11/05/22  0548   PROT 5.8* 5.8* 5.6*   ALKPHOS 48 54 58   ALT 22 21 21   AST 56* 46* 37*   BILITOT 0.3 0.3 0.3       No results for input(s): INR in the last 72 hours. No results for input(s): Bull Gastelum in the last 72 hours. Chronic labs:  Lab Results   Component Value Date    CHOL 230 (H) 09/13/2017    TRIG 273 (H) 09/13/2017    HDL 37 09/13/2017    LDLCALC 138 (H) 09/13/2017    TSH 0.361 11/04/2022    LABA1C 6.7 (H) 11/04/2022       Radiology:  Imaging studies reviewed today. Subjective:     Lauri Safer feeling better.  Watched her while sleeping and sats upper 80s low 90s on 8 L and when woke her up SpO2 >97% when dialed down to 4 L    Objective:     Physical Exam:   /61   Pulse 58   Temp 98 °F (36.7 °C)   Resp 18   Ht 5' 7\" (1.702 m)   Wt 187 lb 3.2 oz (84.9 kg)   SpO2 96%   BMI 29.32 kg/m²     General appearance:in no distress, up in bed  Lungs: diminished in bases bilaterally, no wheezing or crackles   Heart: Regular rate and rhythm, S1, S2 normal   Abdomen: Soft, non-tender and not-distended.  Bowel sounds normal.   Extremities: no edema   Neurologic: Grossly normal and non focal, CN II-XII sig for very [facility]      Clearance MD Arvind   Hospitalist Service   11/05/22 2:31 PM

## 2022-11-05 NOTE — PLAN OF CARE
Problem: Discharge Planning  Goal: Discharge to home or other facility with appropriate resources  Outcome: Progressing     Problem: Cardiovascular - Adult  Goal: Maintains optimal cardiac output and hemodynamic stability  Outcome: Progressing     Problem: Metabolic/Fluid and Electrolytes - Adult  Goal: Hemodynamic stability and optimal renal function maintained  Outcome: Progressing     Problem: Skin/Tissue Integrity  Goal: Absence of new skin breakdown  Description: 1. Monitor for areas of redness and/or skin breakdown  2. Assess vascular access sites hourly  3. Every 4-6 hours minimum:  Change oxygen saturation probe site  4. Every 4-6 hours:  If on nasal continuous positive airway pressure, respiratory therapy assess nares and determine need for appliance change or resting period.   Outcome: Progressing     Problem: Safety - Adult  Goal: Free from fall injury  Outcome: Progressing

## 2022-11-06 LAB
ANION GAP SERPL CALCULATED.3IONS-SCNC: 13 MMOL/L (ref 7–16)
BUN BLDV-MCNC: 24 MG/DL (ref 6–23)
C-REACTIVE PROTEIN: 1.1 MG/DL (ref 0–0.4)
CALCIUM SERPL-MCNC: 8.7 MG/DL (ref 8.6–10.2)
CHLORIDE BLD-SCNC: 101 MMOL/L (ref 98–107)
CO2: 25 MMOL/L (ref 22–29)
CREAT SERPL-MCNC: 0.6 MG/DL (ref 0.5–1)
GFR SERPL CREATININE-BSD FRML MDRD: >60 ML/MIN/1.73
GLUCOSE BLD-MCNC: 148 MG/DL (ref 74–99)
MAGNESIUM: 1.9 MG/DL (ref 1.6–2.6)
POTASSIUM SERPL-SCNC: 4.2 MMOL/L (ref 3.5–5)
PROCALCITONIN: 0.06 NG/ML (ref 0–0.08)
SODIUM BLD-SCNC: 139 MMOL/L (ref 132–146)

## 2022-11-06 PROCEDURE — 6360000002 HC RX W HCPCS: Performed by: STUDENT IN AN ORGANIZED HEALTH CARE EDUCATION/TRAINING PROGRAM

## 2022-11-06 PROCEDURE — 94640 AIRWAY INHALATION TREATMENT: CPT

## 2022-11-06 PROCEDURE — 97165 OT EVAL LOW COMPLEX 30 MIN: CPT

## 2022-11-06 PROCEDURE — 6370000000 HC RX 637 (ALT 250 FOR IP): Performed by: INTERNAL MEDICINE

## 2022-11-06 PROCEDURE — 97535 SELF CARE MNGMENT TRAINING: CPT

## 2022-11-06 PROCEDURE — 83735 ASSAY OF MAGNESIUM: CPT

## 2022-11-06 PROCEDURE — 6360000002 HC RX W HCPCS: Performed by: INTERNAL MEDICINE

## 2022-11-06 PROCEDURE — 2140000000 HC CCU INTERMEDIATE R&B

## 2022-11-06 PROCEDURE — 6360000002 HC RX W HCPCS: Performed by: NURSE PRACTITIONER

## 2022-11-06 PROCEDURE — 86140 C-REACTIVE PROTEIN: CPT

## 2022-11-06 PROCEDURE — 2580000003 HC RX 258: Performed by: STUDENT IN AN ORGANIZED HEALTH CARE EDUCATION/TRAINING PROGRAM

## 2022-11-06 PROCEDURE — 6370000000 HC RX 637 (ALT 250 FOR IP): Performed by: NURSE PRACTITIONER

## 2022-11-06 PROCEDURE — 97530 THERAPEUTIC ACTIVITIES: CPT

## 2022-11-06 PROCEDURE — 80048 BASIC METABOLIC PNL TOTAL CA: CPT

## 2022-11-06 PROCEDURE — 84145 PROCALCITONIN (PCT): CPT

## 2022-11-06 PROCEDURE — 36415 COLL VENOUS BLD VENIPUNCTURE: CPT

## 2022-11-06 PROCEDURE — 2700000000 HC OXYGEN THERAPY PER DAY

## 2022-11-06 RX ORDER — DEXAMETHASONE SODIUM PHOSPHATE 4 MG/ML
10 INJECTION, SOLUTION INTRA-ARTICULAR; INTRALESIONAL; INTRAMUSCULAR; INTRAVENOUS; SOFT TISSUE EVERY 12 HOURS
Status: DISCONTINUED | OUTPATIENT
Start: 2022-11-06 | End: 2022-11-07

## 2022-11-06 RX ADMIN — Medication 10 ML: at 22:13

## 2022-11-06 RX ADMIN — IPRATROPIUM BROMIDE AND ALBUTEROL SULFATE 1 AMPULE: .5; 2.5 SOLUTION RESPIRATORY (INHALATION) at 16:57

## 2022-11-06 RX ADMIN — IPRATROPIUM BROMIDE AND ALBUTEROL SULFATE 1 AMPULE: .5; 2.5 SOLUTION RESPIRATORY (INHALATION) at 10:40

## 2022-11-06 RX ADMIN — MULTIVITAMIN TABLET 1 TABLET: TABLET at 10:15

## 2022-11-06 RX ADMIN — Medication 50 MG: at 10:15

## 2022-11-06 RX ADMIN — BUDESONIDE 250 MCG: 0.25 SUSPENSION RESPIRATORY (INHALATION) at 20:36

## 2022-11-06 RX ADMIN — GUAIFENESIN 400 MG: 400 TABLET ORAL at 10:14

## 2022-11-06 RX ADMIN — Medication 10 ML: at 10:17

## 2022-11-06 RX ADMIN — Medication 2000 UNITS: at 10:15

## 2022-11-06 RX ADMIN — DEXAMETHASONE SODIUM PHOSPHATE 10 MG: 4 INJECTION, SOLUTION INTRAMUSCULAR; INTRAVENOUS at 22:12

## 2022-11-06 RX ADMIN — BENZONATATE 100 MG: 100 CAPSULE ORAL at 15:31

## 2022-11-06 RX ADMIN — Medication 2 CAPSULE: at 10:14

## 2022-11-06 RX ADMIN — GUAIFENESIN 400 MG: 400 TABLET ORAL at 22:12

## 2022-11-06 RX ADMIN — BENZONATATE 100 MG: 100 CAPSULE ORAL at 22:12

## 2022-11-06 RX ADMIN — IPRATROPIUM BROMIDE AND ALBUTEROL SULFATE 1 AMPULE: .5; 2.5 SOLUTION RESPIRATORY (INHALATION) at 20:36

## 2022-11-06 RX ADMIN — CYANOCOBALAMIN 1000 MCG: 1000 INJECTION, SOLUTION INTRAMUSCULAR; SUBCUTANEOUS at 10:15

## 2022-11-06 RX ADMIN — GUAIFENESIN 400 MG: 400 TABLET ORAL at 15:31

## 2022-11-06 RX ADMIN — ARFORMOTEROL TARTRATE 15 MCG: 15 SOLUTION RESPIRATORY (INHALATION) at 10:40

## 2022-11-06 RX ADMIN — FUROSEMIDE 20 MG: 20 TABLET ORAL at 10:15

## 2022-11-06 RX ADMIN — Medication 2 CAPSULE: at 22:13

## 2022-11-06 RX ADMIN — ENOXAPARIN SODIUM 40 MG: 100 INJECTION SUBCUTANEOUS at 10:15

## 2022-11-06 RX ADMIN — OXYCODONE HYDROCHLORIDE AND ACETAMINOPHEN 1000 MG: 500 TABLET ORAL at 10:14

## 2022-11-06 RX ADMIN — IPRATROPIUM BROMIDE AND ALBUTEROL SULFATE 1 AMPULE: .5; 2.5 SOLUTION RESPIRATORY (INHALATION) at 13:31

## 2022-11-06 RX ADMIN — ARFORMOTEROL TARTRATE 15 MCG: 15 SOLUTION RESPIRATORY (INHALATION) at 20:36

## 2022-11-06 RX ADMIN — ENOXAPARIN SODIUM 40 MG: 100 INJECTION SUBCUTANEOUS at 22:13

## 2022-11-06 RX ADMIN — BUDESONIDE 250 MCG: 0.25 SUSPENSION RESPIRATORY (INHALATION) at 10:40

## 2022-11-06 RX ADMIN — LEVOFLOXACIN 750 MG: 5 INJECTION, SOLUTION INTRAVENOUS at 15:40

## 2022-11-06 RX ADMIN — BENZONATATE 100 MG: 100 CAPSULE ORAL at 10:15

## 2022-11-06 RX ADMIN — Medication 5 MG: at 22:12

## 2022-11-06 NOTE — PROGRESS NOTES
Roz Solis M.D.,[facility]  Jazmin Thomas D.O., F.A.C.O.I., Duane Macedo M.D. Avery Lenz M.D. Artemio Cooney D.O. Daily Pulmonary Progress Note    Patient:  Desiree Holcomb 78 y.o. female MRN: [id number]     Date of Service: 11/6/2022      Synopsis     We are following patient for covid pneumonia    \"CC\" fall     Code status: full       Subjective      Patient was seen and examined. felling better. Oxygen requirements and cough have improvement,. Review of Systems:  Constitutional: Denies fever, weight loss, night sweats, and fatigue  Skin: Denies pigmentation, dark lesions, and rashes   HEENT: Denies hearing loss, tinnitus, ear drainage, epistaxis, sore throat, and hoarseness. Cardiovascular: Denies palpitations, chest pain, and chest pressure. Respiratory:  hacking cough  Gastrointestinal: Denies nausea, vomiting, poor appetite, diarrhea, heartburn or reflux  Genitourinary: Denies dysuria, frequency, urgency or hematuria  Musculoskeletal: Denies myalgias, muscle weakness, and bone pain  Neurological: Denies dizziness, vertigo, headache, and focal weakness  Psychological: Denies anxiety and depression  Endocrine: Denies heat intolerance and cold intolerance  Hematopoietic/Lymphatic: Denies bleeding problems and blood transfusions    24-hour events:   Toci x 1    Objective   Vitals: BP (!) 140/82   Pulse 92   Temp 97.5 °F (36.4 °C) (Oral)   Resp 20   Ht 5' 7\" (1.702 m)   Wt 180 lb 3.2 oz (81.7 kg)   SpO2 91%   BMI 28.22 kg/m²     I/O:    Intake/Output Summary (Last 24 hours) at 11/6/2022 1426  Last data filed at 11/6/2022 1044  Gross per 24 hour   Intake 360 ml   Output 3000 ml   Net -2640 ml                        CURRENT MEDS :  Scheduled Meds:   dexamethasone  10 mg IntraVENous Q12H    furosemide  20 mg Oral Daily    multivitamin  1 tablet Oral Daily    lactobacillus  2 capsule Oral BID    levofloxacin  750 mg IntraVENous Q24H    guaiFENesin  400 mg Oral TID benzonatate  100 mg Oral TID    melatonin  5 mg Oral Nightly    vitamin D  50,000 Units Oral Weekly    vitamin D  2,000 Units Oral Daily    ascorbic acid  1,000 mg Oral Daily    zinc sulfate  50 mg Oral Daily    ipratropium-albuterol  1 ampule Inhalation Q4H WA    sodium chloride flush  5-40 mL IntraVENous 2 times per day    enoxaparin  40 mg SubCUTAneous BID    Arformoterol Tartrate  15 mcg Nebulization BID    budesonide  250 mcg Nebulization BID       Physical Exam:  General Appearance: appears comfortable in no acute distress. HEENT: Normocephalic atraumatic without obvious abnormality   Neck: Lips, mucosa, and tongue normal.  Supple, symmetrical, trachea midline, no adenopathy;thyroid:  no enlargement/tenderness/nodules or JVD. Lung: Breath sounds  few crackles bibasilar  Respirations   unlabored. Symmetrical expansion. Heart: RRR, normal S1, S2. No MRG  Abdomen: Soft, NT, ND. BS present x 4 quadrants. No bruit or organomegaly. Extremities: Pedal pulses 2+ symmetric b/l. Extremities normal, no cyanosis, clubbing, or edema. Musculokeletal: No joint swelling, no muscle tenderness. ROM normal in all joints of extremities. Neurologic: Mental status: Alert and Oriented X3 . Pertinent/ New Labs and Imaging Studies     Imaging Personally Reviewed:  11/2/22 cxr      FINDINGS:   Mild hazy opacity in the lower lungs. The heart is not enlarged. No   pneumothorax. Minimal blunting of the left costophrenic angle. Impression   Mild bibasilar infiltrates slightly worse on the left. CTA chest  FINDINGS:   Pulmonary Arteries: Upper normal in caliber. No PE. Lungs and pleura: Bilateral lower lobe and perihilar airspace opacities   compatible with pulmonary edema, or less likely, pneumonia. Bilateral upper   lobe scattered ground-glass opacities. No pleural effusion or pneumothorax. Heart and mediastinum: Borderline cardiomegaly and with mild left atrial   enlargement.   No pericardial effusion. Clustered subcarinal lymph nodes   measuring 1.7 cm in AP dimension. Multiple additional mildly enlarged   mediastinal lymph nodes and bilateral hilar nodes. Left axillary 1.9 cm node   versus epithelial inclusion cyst.  Normal right axilla. Atherosclerotic   thoracic aorta which is normal in caliber. Upper abdomen: Please see separate report       Bones: No acute osseous abnormality. Impression   1. No PE.       2.  Bilateral widespread airspace opacities compatible pulmonary edema, or   less likely pneumonia. Left atrial enlargement. 3.  Mediastinal and bilateral hilar lymph node enlargement, nonspecific. RECOMMENDATIONS:   Unavailable                  Echo:  Pending            Labs:  Lab Results   Component Value Date/Time    WBC 5.9 11/05/2022 05:48 AM    HGB 14.8 11/05/2022 05:48 AM    HCT 45.0 11/05/2022 05:48 AM    MCV 89.1 11/05/2022 05:48 AM    MCH 29.3 11/05/2022 05:48 AM    MCHC 32.9 11/05/2022 05:48 AM    RDW 14.8 11/05/2022 05:48 AM     11/05/2022 05:48 AM    MPV 11.0 11/05/2022 05:48 AM     Lab Results   Component Value Date/Time     11/06/2022 06:12 AM    K 4.2 11/06/2022 06:12 AM    K 4.2 11/03/2022 05:36 AM     11/06/2022 06:12 AM    CO2 25 11/06/2022 06:12 AM    BUN 24 11/06/2022 06:12 AM    CREATININE 0.6 11/06/2022 06:12 AM    LABALBU 3.0 11/05/2022 05:48 AM    CALCIUM 8.7 11/06/2022 06:12 AM    GFRAA >60 09/13/2017 03:04 PM    LABGLOM >60 11/06/2022 06:12 AM     No results found for: PROTIME, INR  No results for input(s): PROBNP in the last 72 hours. Recent Labs     11/06/22  0600   PROCAL 0.06     This SmartLink has not been configured with any valid records. Micro:  No results for input(s): CULTRESP in the last 72 hours. No results for input(s): LABGRAM in the last 72 hours. No results for input(s): LEGUR in the last 72 hours. No results for input(s): STREPNEUMAGU in the last 72 hours.   No results for input(s): LP1UAG in the last 72 hours. SARS-COV-2 biomarkers    Recent Labs     11/04/22  0503 11/05/22  0548 11/06/22  0600   CRP 4.7*  --  1.1*   PROCAL 0.12*  --  0.06   AST 46* 37*  --    ALT 21 21  --       Lab Results   Component Value Date/Time    VITD25 11 (L) 11/03/2022 05:36 AM        Assessment:    COVID-19 pneumonia, unvaccinated  Acute respiratory failure with hypoxia  Community-acquired pneumonia  Pelvic mass  Unintentional weight loss 68 pounds over 1 year  Ongoing nicotine dependence up to 1 pack/day for 63 years  Fall at home hit head, laceration left side forehead and left arm      Plan:   Patient oxygenation has improved currently down to 2L/min saturating 92%  Elevated inflammatory markers, fever at home. received tocilizumab x 1, does not meet criteria for remdesivir. Increase dexamethasone 10 mg IV twice daily will decrease down 10 mg daily for 5 more days. vitamins-C, D, and zinc  Levaquin 750 mg daily x 7 days for CAP coverage  Bronchodilators via nebulizer with Respirgard to prevent droplet spread. Brovana and budesonide,  duo nebs. Continue guaifenesin TID  Chest imaging reviewed 11/2/22  Incentive spirometer, lung recruitment maneuvers  2D echo pending  Await final cultures, trend inflammatory markers. Monitor off antibiotics.   PT/OT  DVT, GI prophylaxis  Tobacco cessation counseling       Electronically signed by Manoj Singh MD on 11/6/2022 at 2:26 PM

## 2022-11-06 NOTE — PLAN OF CARE
Problem: Discharge Planning  Goal: Discharge to home or other facility with appropriate resources  11/6/2022 1108 by Byron Jenkins RN  Outcome: Progressing  11/6/2022 1106 by Byron Jenkins RN  Outcome: Progressing     Problem: Cardiovascular - Adult  Goal: Maintains optimal cardiac output and hemodynamic stability  11/6/2022 1108 by Byron Jenkins RN  Outcome: Progressing  11/6/2022 1106 by Byron Jenkins RN  Outcome: Progressing     Problem: Metabolic/Fluid and Electrolytes - Adult  Goal: Hemodynamic stability and optimal renal function maintained  11/6/2022 1108 by Byron Jenkins RN  Outcome: Progressing  11/6/2022 1106 by Byron Jenkins RN  Outcome: Progressing     Problem: Skin/Tissue Integrity  Goal: Absence of new skin breakdown  Description: 1. Monitor for areas of redness and/or skin breakdown  2. Assess vascular access sites hourly  3. Every 4-6 hours minimum:  Change oxygen saturation probe site  4. Every 4-6 hours:  If on nasal continuous positive airway pressure, respiratory therapy assess nares and determine need for appliance change or resting period.   11/6/2022 1108 by Byron Jenkins RN  Outcome: Progressing  11/6/2022 1106 by Byron Jenkins RN  Outcome: Progressing     Problem: Safety - Adult  Goal: Free from fall injury  11/6/2022 1108 by Byron Jenkins RN  Outcome: Progressing  11/6/2022 1106 by Byron Jenkins RN  Outcome: Progressing

## 2022-11-06 NOTE — PROGRESS NOTES
Hospitalist Progress Note            Patient: Peggy Valdes Age: 78 y.o.   DOA: 11/2/2022 Admit Dx / CC: Acute respiratory failure (HCC) [J96.00]  Pelvic mass [R19.00]  Cavitary lesion of lung [J98.4]  Acute respiratory failure with hypoxia (HCC) [J96.01]  COVID [U07.1]  COVID-19 virus infection [U07.1]   LOS:  LOS: 4 days      Assessment/ Plan:     Ms. Peggy Valdes, a 78y.o. year old female  who  has a past medical history of Fluid retention in legs. Patient presented to ED for evaluation of frequent falls for about a week, found to have COVID 19 requiring O2 supplementation and is being followed by PCCM. Generalized weakness/Physical deconditioning with falls 2/2 Acute hypoxic respiratory failure 2/2 COVID-19 in pneumonia, r/o CHF. Also suspect underlying undiagnosed COPD/Longstanding active smoker, improving  Counseld on smoking cessation  Cont O2 supplement and wean off as tolerated, down to 2 L, Keep SpO2 >=88%  Cont IV decadron  Started on IV levoquin  Add probiotic  S/p dose of Toci 11/3  Cont lasix 20 mg daily  Nebs  Bronchial hygiene  Echo pend (can be completed as OP)  Encouraged IS and doing well  Recommend OP PFTs  Pulmo following  PT/OT/Increase acitivty    Obesity, BMI 29    Mildly elevated troponin suspect 2/2 demand  No chest pain. Trend flat and not consistent with NSTEMI    Urinary incontinence with retention ? overflow incontinence   Bladder scans    Left forehead and left upper extremity laceration 2/2 fall  Received tetanus vaccine  Wound care    Possible adrenal adenoma versus adrenal hyperplasia versus adrenal hemorrhage by CT scan report  Recommend OP w/u for Cushings syndrome    Very [place]  She has right-sided hearing aid    Abnormally thickened endometrium  OP f/u with GYN    Vitamin D deficiency  Started replacement    Suspect ROSE  SpO2 drops during sleep  Recommend OP sleep study (ordered)    DVT ppx:  Lovenox  Code status: Full code    Plan discharge to SNF in ~1-2 days    Plan of care discussed with: patient and bedside RN, JOSE    CT:  Impression   Enhancing area visualized within the endometrial cavity with surrounding low   attenuation worrisome for a mass would recommend further evaluation with   transvaginal pelvic ultrasound. Focal aneurysmal dilatation of the distal abdominal aorta and common iliac   artery. Abdominal aorta maximal transverse diameters of 3.5 x 3.5 cm. Nodularity visualized in the left adrenal gland, differential diagnosis would   include adrenal adenoma, adrenal hyperplasia less likely adrenal hemorrhage. Extensive degenerative bone changes are seen. Small type 1 hiatus hernia. 3.8 cm cavitary lesion visualized in the inferolateral left lower lobe. Prominent chronic interstitial and bronchovascular lung changes is seen. TV US:  Impression   Abnormally thickened endometrium. The apparent enhancing intracavitary mass,   as seen on the prior CT, is not evident by transabdominal sonography. .  Ob   consultation and histologic sampling are recommended.      Patient Active Problem List   Diagnosis    Acute respiratory failure (HCC)    Pneumonia due to COVID-19 virus    Tobacco abuse    Vitamin D deficiency    [place] (hard of hearing)    Noncompliance        Medications:  Reviewed    Infusion Medications    sodium chloride       Scheduled Medications    dexamethasone  10 mg IntraVENous Q12H    furosemide  20 mg Oral Daily    multivitamin  1 tablet Oral Daily    lactobacillus  2 capsule Oral BID    levofloxacin  750 mg IntraVENous Q24H    guaiFENesin  400 mg Oral TID    benzonatate  100 mg Oral TID    melatonin  5 mg Oral Nightly    vitamin D  50,000 Units Oral Weekly    vitamin D  2,000 Units Oral Daily    ascorbic acid  1,000 mg Oral Daily    zinc sulfate  50 mg Oral Daily    ipratropium-albuterol  1 ampule Inhalation Q4H WA    sodium chloride flush  5-40 mL IntraVENous 2 times per day    enoxaparin  40 mg SubCUTAneous BID    Arformoterol Tartrate  15 mcg Nebulization BID    budesonide  250 mcg Nebulization BID     PRN Meds: acetaminophen **OR** [DISCONTINUED] acetaminophen, melatonin, albuterol sulfate HFA, sodium chloride flush, sodium chloride, ondansetron **OR** ondansetron, polyethylene glycol, guaiFENesin-dextromethorphan    I/O    Intake/Output Summary (Last 24 hours) at 11/6/2022 1147  Last data filed at 11/6/2022 0538  Gross per 24 hour   Intake --   Output 3000 ml   Net -3000 ml       Labs:   Recent Labs     11/04/22  0503 11/05/22  0548   WBC 5.0 5.9   HGB 14.9 14.8   HCT 44.1 45.0    182       Recent Labs     11/04/22  0503 11/05/22  0548 11/06/22  0612    136 139   K 4.0 4.2 4.2    101 101   CO2 23 22 25   BUN 31* 32* 24*   CREATININE 0.7 0.7 0.6   CALCIUM 8.1* 8.4* 8.7       Recent Labs     11/04/22  0503 11/05/22  0548   PROT 5.8* 5.6*   ALKPHOS 54 58   ALT 21 21   AST 46* 37*   BILITOT 0.3 0.3       No results for input(s): INR in the last 72 hours. No results for input(s): Othelia Nutley in the last 72 hours. Chronic labs:  Lab Results   Component Value Date    CHOL 230 (H) 09/13/2017    TRIG 273 (H) 09/13/2017    HDL 37 09/13/2017    LDLCALC 138 (H) 09/13/2017    TSH 0.361 11/04/2022    LABA1C 6.7 (H) 11/04/2022       Radiology:  Imaging studies reviewed today. Subjective:     Madelyn Osorio is doing much better. Using her IS. Up in chair    Objective:     Physical Exam:   BP (!) 140/82   Pulse 98   Temp 97.5 °F (36.4 °C) (Oral)   Resp 20   Ht 5' 7\" (1.702 m)   Wt 180 lb 3.2 oz (81.7 kg)   SpO2 91%   BMI 28.22 kg/m²     General appearance:in no distress, up in chair  Lungs: diminished in R base, no wheezing or crackles   Heart: Regular rate and rhythm, S1, S2 normal   Abdomen: Soft, non-tender and not-distended.  Bowel sounds normal.   Extremities: no edema   Neurologic: Grossly normal and non focal, CN II-XII sig for very [facility]AY [facility]      Melva Silva MD Hospitalist Service   11/06/22 11:47 AM

## 2022-11-06 NOTE — PROGRESS NOTES
Occupational Therapy  OCCUPATIONAL THERAPY INITIAL EVALUATION    [address] [address] [address]      [id number]                                                Patient Name: Blair Duggan  MRN: [id number]  : 1945  Room: 05/6044-X    Evaluating OT: Man Lam OTR/L #[id number]     Referring Provider: Juany Swift MD  Specific Provider Orders/Date: OT eval and treat 22    Diagnosis: Acute respiratory failure ([address] [J96.00]  Pelvic mass [R19.00]  Cavitary lesion of lung [J98.4]  Acute respiratory failure with hypoxia ([address] [J96.01]  COVID [U07.1]  COVID-19 virus infection [U07.1]   Pt admitted to hospital with SOB    Pertinent Medical History:  has a past medical history of Fluid retention in legs.        Precautions:  Fall Risk, Droplet plus (COVID-19),  10L O2 via HFNC, continuous pulse ox, [place]    Assessment of current deficits    [x] Functional mobility  [x]ADLs  [x] Strength               []Cognition    [x] Functional transfers   [x] IADLs         [x] Safety Awareness   [x]Endurance    [] Fine Coordination              [x] Balance      [] Vision/perception   []Sensation     []Gross Motor Coordination  [] ROM  [] Delirium                   [] Motor Control     OT PLAN OF CARE   OT POC based on physician orders, patient diagnosis and results of clinical assessment    Frequency/Duration 1-3 days/wk for 2 weeks PRN   Specific OT Treatment Interventions to include:   * Instruction/training on adapted ADL techniques and AE recommendations to increase functional independence within precautions       * Training on energy conservation strategies, correct breathing pattern and techniques to improve independence/tolerance for self-care routine  * Functional transfer/mobility training/DME recommendations for increased independence, safety, and fall prevention  * Patient/Family education to increase follow through with safety techniques and functional independence  * Recommendation of environmental modifications for increased safety with functional transfers/mobility and ADLs  * Therapeutic exercise to improve motor endurance, ROM, and functional strength for ADLs/functional transfers  * Therapeutic activities to facilitate/challenge dynamic balance, stand tolerance for increased safety and independence with ADLs  * Therapeutic activities to facilitate gross/fine motor skills for increased independence with ADLs  * Neuro-muscular re-education: facilitation of righting/equilibrium reactions, midline orientation, scapular stability/mobility, normalization of muscle tone, and facilitation of volitional active controled movement      Recommended Adaptive Equipment:  TBD     Home Living: Pt lives with granddaughter in a mobile home with 3 DINESH and 1 hand rail    Bathroom setup: tub/shower combo    Equipment owned: none    Prior Level of Function: Independent with ADLs , Independent  with IADLs; ambulated without AD   Driving: yes   Occupation: enjoys crosswThoora puzzles and cards    Pain Level: Pt denies pain this session    Cognition: A&O: 4/4; Follows 2 step directions   Memory:  good   Sequencing:  good   Problem solving:  fair   Judgement/safety:  fair     Functional Assessment:  AM-PAC Daily Activity Raw Score: 16/24   Initial Eval Status  Date: 11/6/22 Treatment Status  Date: STGs = LTGs  Time frame: 10-14 days   Feeding Independent      Grooming Minimal Assist   Modified [place]    UB Dressing Minimal Assist   Modified [place]    LB Dressing Maximal Assist   Modified [place]    Bathing Moderate Assist  Modified [place]    Toileting Moderate Assist   Modified [place]     Bed Mobility  Supine to sit: Minimal Assist   Sit to supine: Minimal Assist   Modified [place]    Functional Transfers Minimal Assist   Modified [place]    Functional Mobility Minimal Assist   Modified [place]    Balance Sitting: Static:  SBA    Dynamic:min A  Standing: min A at w/w      Activity Tolerance F-  F+   Visual/  Perceptual Glasses: yes  wfl                  Vitals:  10L via HFNC  Rest: O2 sat 94%, HR 90 bpm  Activity: O2 sat 84-90%  Rest: O2 sat  95%  Nursing notified       Hand Dominance right   Strength ROM Additional Info:    RUE   4-/5 wfl good  and wfl FMC/dexterity noted during ADL tasks     LUE 4-/5 wfl good  and wfl FMC/dexterity noted during ADL tasks     Hearing: [place]  Sensation:wfl  Tone: wfl  Edema:none noted     Comments: Upon arrival patient supine in bed and agreeable to OT Session. Therapist educated pt on role of OT. At end of session, patient seated in chair with call light and phone within reach, all lines and tubes intact (nursing present). Overall patient demonstrated decreased independence and safety during completion of ADL/functional transfer/mobility tasks. Pt would benefit from continued skilled OT to increase safety and independence with completion of ADL/IADL tasks for functional independence and quality of life. Treatment: OT treatment provided this date includes: Facilitation of bed mobility, unsupported sitting balance (impacting ADLs; addressing posture, weight shifting, dynamic reaching), functional transfers (various surfaces), standing tolerance tasks (addressing posture, balance and activity tolerance while incorporating light functional reaching; impacting ADLs and functional activity) and functional ambulation tasks with w/w (including to/ from bathroom and in preparation for item retrieval tasks; cuing on posture, w/w management and safety) - skilled cuing on hand placement, posture, body mechanics, energy conservation techniques and safety. Therapist facilitated self-care retraining: UB/LB self-care tasks (gown, socks), toileting hygiene task and standing grooming tasks while educating pt on modified techniques, posture, safety and energy conservation techniques.  Skilled monitoring of HR, O2 sats and pts response to treatment (see above). Rehab Potential: Good for established goals     Patient / Family Goal: return home      Patient and/or family were instructed on functional diagnosis, prognosis/goals and OT plan of care. Demonstrated fair understanding. Eval Complexity: Low    Time In: 930  Time Out: 1010  Total Treatment Time: 25 minutes    Min Units   OT Eval Low 97165  x  1   OT Eval Medium 72803      OT Eval High [id number]      OT Re-Eval [id number]       Therapeutic Ex 09273       Therapeutic Activities 70351  14  1   ADL/Self Care 10923  11  1   Orthotic Management 58576       Manual [id number]     Neuro Re-Ed [id number]       Non-Billable Time          Evaluation Time additionally includes thorough review of current medical information, gathering information on past medical history/social history and prior level of function, interpretation of standardized testing/informal observation of tasks, assessment of data and development of plan of care and goals.           William Scott OTR/L #[id number]

## 2022-11-07 PROBLEM — G47.34 NOCTURNAL OXYGEN DESATURATION: Status: ACTIVE | Noted: 2022-11-07

## 2022-11-07 PROCEDURE — 6370000000 HC RX 637 (ALT 250 FOR IP): Performed by: NURSE PRACTITIONER

## 2022-11-07 PROCEDURE — 6360000002 HC RX W HCPCS: Performed by: STUDENT IN AN ORGANIZED HEALTH CARE EDUCATION/TRAINING PROGRAM

## 2022-11-07 PROCEDURE — 6370000000 HC RX 637 (ALT 250 FOR IP): Performed by: INTERNAL MEDICINE

## 2022-11-07 PROCEDURE — 51798 US URINE CAPACITY MEASURE: CPT

## 2022-11-07 PROCEDURE — 6360000002 HC RX W HCPCS: Performed by: INTERNAL MEDICINE

## 2022-11-07 PROCEDURE — 94640 AIRWAY INHALATION TREATMENT: CPT

## 2022-11-07 PROCEDURE — 2140000000 HC CCU INTERMEDIATE R&B

## 2022-11-07 PROCEDURE — 2700000000 HC OXYGEN THERAPY PER DAY

## 2022-11-07 PROCEDURE — 2580000003 HC RX 258: Performed by: STUDENT IN AN ORGANIZED HEALTH CARE EDUCATION/TRAINING PROGRAM

## 2022-11-07 RX ORDER — LEVOFLOXACIN 750 MG/1
750 TABLET ORAL DAILY
Qty: 4 TABLET | Refills: 0
Start: 2022-11-07 | End: 2022-11-11

## 2022-11-07 RX ORDER — GUAIFENESIN 600 MG/1
1200 TABLET, EXTENDED RELEASE ORAL 2 TIMES DAILY
Qty: 40 TABLET | Refills: 0 | Status: SHIPPED | OUTPATIENT
Start: 2022-11-07 | End: 2022-11-17

## 2022-11-07 RX ORDER — CHOLECALCIFEROL (VITAMIN D3) 50 MCG
2000 TABLET ORAL DAILY
Qty: 30 TABLET | Refills: 0
Start: 2022-11-07

## 2022-11-07 RX ORDER — ACETAMINOPHEN 500 MG
1000 TABLET ORAL EVERY 6 HOURS PRN
Qty: 120 TABLET | Refills: 3
Start: 2022-11-07

## 2022-11-07 RX ORDER — ERGOCALCIFEROL 1.25 MG/1
50000 CAPSULE ORAL WEEKLY
Qty: 12 CAPSULE | Refills: 0
Start: 2022-11-10

## 2022-11-07 RX ORDER — DEXAMETHASONE 4 MG/1
6 TABLET ORAL DAILY
Status: DISCONTINUED | OUTPATIENT
Start: 2022-11-07 | End: 2022-11-10 | Stop reason: HOSPADM

## 2022-11-07 RX ORDER — MULTIVITAMIN WITH IRON
1 TABLET ORAL DAILY
Qty: 1 TABLET | Refills: 0
Start: 2022-11-07

## 2022-11-07 RX ORDER — LACTOBACILLUS RHAMNOSUS GG 10B CELL
2 CAPSULE ORAL 2 TIMES DAILY
Qty: 1 CAPSULE | Refills: 0
Start: 2022-11-07

## 2022-11-07 RX ORDER — LEVOFLOXACIN 750 MG/1
750 TABLET ORAL DAILY
Status: COMPLETED | OUTPATIENT
Start: 2022-11-07 | End: 2022-11-10

## 2022-11-07 RX ORDER — BENZONATATE 100 MG/1
100 CAPSULE ORAL 3 TIMES DAILY
Qty: 21 CAPSULE | Refills: 0
Start: 2022-11-07 | End: 2022-11-14

## 2022-11-07 RX ORDER — ALBUTEROL SULFATE 90 UG/1
2 AEROSOL, METERED RESPIRATORY (INHALATION) EVERY 4 HOURS PRN
Qty: 18 G | Refills: 3 | Status: SHIPPED | OUTPATIENT
Start: 2022-11-07

## 2022-11-07 RX ORDER — FUROSEMIDE 20 MG/1
20 TABLET ORAL DAILY
Qty: 60 TABLET | Refills: 3
Start: 2022-11-07

## 2022-11-07 RX ORDER — DEXAMETHASONE 6 MG/1
6 TABLET ORAL DAILY
Qty: 5 TABLET | Refills: 0
Start: 2022-11-07 | End: 2022-11-12

## 2022-11-07 RX ADMIN — GUAIFENESIN 400 MG: 400 TABLET ORAL at 22:46

## 2022-11-07 RX ADMIN — Medication 10 ML: at 08:51

## 2022-11-07 RX ADMIN — Medication 2 CAPSULE: at 08:50

## 2022-11-07 RX ADMIN — GUAIFENESIN 400 MG: 400 TABLET ORAL at 14:37

## 2022-11-07 RX ADMIN — Medication 2 CAPSULE: at 22:47

## 2022-11-07 RX ADMIN — IPRATROPIUM BROMIDE AND ALBUTEROL SULFATE 1 AMPULE: .5; 2.5 SOLUTION RESPIRATORY (INHALATION) at 10:04

## 2022-11-07 RX ADMIN — IPRATROPIUM BROMIDE AND ALBUTEROL SULFATE 1 AMPULE: .5; 2.5 SOLUTION RESPIRATORY (INHALATION) at 14:26

## 2022-11-07 RX ADMIN — ENOXAPARIN SODIUM 40 MG: 100 INJECTION SUBCUTANEOUS at 22:47

## 2022-11-07 RX ADMIN — BENZONATATE 100 MG: 100 CAPSULE ORAL at 08:50

## 2022-11-07 RX ADMIN — LEVOFLOXACIN 750 MG: 750 TABLET, FILM COATED ORAL at 09:00

## 2022-11-07 RX ADMIN — MULTIVITAMIN TABLET 1 TABLET: TABLET at 08:50

## 2022-11-07 RX ADMIN — DEXAMETHASONE 6 MG: 4 TABLET ORAL at 08:50

## 2022-11-07 RX ADMIN — ENOXAPARIN SODIUM 40 MG: 100 INJECTION SUBCUTANEOUS at 08:50

## 2022-11-07 RX ADMIN — IPRATROPIUM BROMIDE AND ALBUTEROL SULFATE 1 AMPULE: .5; 2.5 SOLUTION RESPIRATORY (INHALATION) at 16:12

## 2022-11-07 RX ADMIN — FUROSEMIDE 20 MG: 20 TABLET ORAL at 08:50

## 2022-11-07 RX ADMIN — OXYCODONE HYDROCHLORIDE AND ACETAMINOPHEN 1000 MG: 500 TABLET ORAL at 08:50

## 2022-11-07 RX ADMIN — Medication 50 MG: at 08:50

## 2022-11-07 RX ADMIN — BENZONATATE 100 MG: 100 CAPSULE ORAL at 14:37

## 2022-11-07 RX ADMIN — Medication 2000 UNITS: at 08:50

## 2022-11-07 RX ADMIN — ARFORMOTEROL TARTRATE 15 MCG: 15 SOLUTION RESPIRATORY (INHALATION) at 10:05

## 2022-11-07 RX ADMIN — Medication 5 MG: at 22:46

## 2022-11-07 RX ADMIN — GUAIFENESIN 400 MG: 400 TABLET ORAL at 08:49

## 2022-11-07 RX ADMIN — BENZONATATE 100 MG: 100 CAPSULE ORAL at 22:46

## 2022-11-07 RX ADMIN — BUDESONIDE 250 MCG: 0.25 SUSPENSION RESPIRATORY (INHALATION) at 10:04

## 2022-11-07 NOTE — PROGRESS NOTES
Patient's fingers are ice cold to the touch r/t bathing. SaO2 was reading 69% to her index finger because of this coldness to her extremities. SaO2 probe was placed on patient's earlobe with the reading being 90% on 1l. With 2L, patient's SaO2 reading 92-93%.

## 2022-11-07 NOTE — PLAN OF CARE
Problem: Discharge Planning  Goal: Discharge to home or other facility with appropriate resources  Outcome: Progressing     Problem: Cardiovascular - Adult  Goal: Maintains optimal cardiac output and hemodynamic stability  Outcome: Progressing     Problem: Metabolic/Fluid and Electrolytes - Adult  Goal: Hemodynamic stability and optimal renal function maintained  Outcome: Progressing     Problem: Skin/Tissue Integrity  Goal: Absence of new skin breakdown  Description: 1. Monitor for areas of redness and/or skin breakdown  2. Assess vascular access sites hourly  3. Every 4-6 hours minimum:  Change oxygen saturation probe site  4. Every 4-6 hours:  If on nasal continuous positive airway pressure, respiratory therapy assess nares and determine need for appliance change or resting period.   Outcome: Progressing     Problem: Safety - Adult  Goal: Free from fall injury  Outcome: Progressing     Problem: Safety - Adult  Goal: Free from fall injury  Outcome: Progressing

## 2022-11-07 NOTE — PROGRESS NOTES
PT RT RN CALL Sujata Lucero M.D.,[facility]  Pina Venegas D.O., F.A.C.O.I., Samira Estrella M.D. Vikki Arreaga M.D. Robyn Trejo D.O. Daily Pulmonary Progress Note    Patient:  Luana Bass 78 y.o. female MRN: [id number]     Date of Service: 11/7/2022      Synopsis     We are following patient for covid pneumonia    \"CC\" fall     Code status: full       Subjective      Patient is feeling better. Oxygen requirements better, O2 almost weaned off. Cough improving. Encouraged to use incentive spirometer. Review of Systems:  Constitutional: Denies fever, weight loss, night sweats, and fatigue  Skin: Denies pigmentation, dark lesions, and rashes   HEENT: Denies hearing loss, tinnitus, ear drainage, epistaxis, sore throat, and hoarseness. Cardiovascular: Denies palpitations, chest pain, and chest pressure.   Respiratory:  hacking cough  Gastrointestinal: Denies nausea, vomiting, poor appetite, diarrhea, heartburn or reflux  Genitourinary: Denies dysuria, frequency, urgency or hematuria  Musculoskeletal: Denies myalgias, muscle weakness, and bone pain  Neurological: Denies dizziness, vertigo, headache, and focal weakness  Psychological: Denies anxiety and depression  Endocrine: Denies heat intolerance and cold intolerance  Hematopoietic/Lymphatic: Denies bleeding problems and blood transfusions    24-hour events:  None    Objective   Vitals: /69   Pulse 65   Temp (!) 96.5 °F (35.8 °C) (Oral)   Resp 19   Ht 5' 7\" (1.702 m)   Wt 178 lb 3.2 oz (80.8 kg)   SpO2 93%   BMI 27.91 kg/m²     I/O:    Intake/Output Summary (Last 24 hours) at 11/7/2022 1429  Last data filed at 11/7/2022 0955  Gross per 24 hour   Intake --   Output 2450 ml   Net -2450 ml                          CURRENT MEDS :  Scheduled Meds:   dexamethasone  6 mg Oral Daily    levoFLOXacin  750 mg Oral Daily    furosemide  20 mg Oral Daily    multivitamin  1 tablet Oral Daily    lactobacillus  2 capsule Oral BID    guaiFENesin  400 mg Oral TID    benzonatate  100 mg Oral TID    melatonin  5 mg Oral Nightly    vitamin D  50,000 Units Oral Weekly    vitamin D  2,000 Units Oral Daily    ascorbic acid  1,000 mg Oral Daily    zinc sulfate  50 mg Oral Daily    ipratropium-albuterol  1 ampule Inhalation Q4H WA    sodium chloride flush  5-40 mL IntraVENous 2 times per day    enoxaparin  40 mg SubCUTAneous BID    Arformoterol Tartrate  15 mcg Nebulization BID    budesonide  250 mcg Nebulization BID       Physical Exam:  Due to the current efforts to prevent transmission of COVID-19 and also the need to preserve PPE for other caregivers, a face-to-face encounter with the patient was not performed. That being said, all relevant records and diagnostic tests were reviewed, including laboratory results and imaging. Please reference any relevant documentation elsewhere. Care will be coordinated with the primary service. Pertinent/ New Labs and Imaging Studies     Imaging Personally Reviewed:  11/5/22 cxr    FINDINGS:   The cardiac silhouette is normal in size. There are bilateral pulmonary   infiltrates. This finding has increased on the left since the prior exam.   No pneumothorax or definite pleural effusion is seen. Impression   Bilateral pulmonary infiltrates, increased on the left since 11/02/2022. CTA chest  FINDINGS:   Pulmonary Arteries: Upper normal in caliber. No PE. Lungs and pleura: Bilateral lower lobe and perihilar airspace opacities   compatible with pulmonary edema, or less likely, pneumonia. Bilateral upper   lobe scattered ground-glass opacities. No pleural effusion or pneumothorax. Heart and mediastinum: Borderline cardiomegaly and with mild left atrial   enlargement. No pericardial effusion. Clustered subcarinal lymph nodes   measuring 1.7 cm in AP dimension. Multiple additional mildly enlarged   mediastinal lymph nodes and bilateral hilar nodes.   Left axillary 1.9 cm node   versus epithelial inclusion cyst.  Normal right axilla. Atherosclerotic   thoracic aorta which is normal in caliber. Upper abdomen: Please see separate report       Bones: No acute osseous abnormality. Impression   1. No PE.       2.  Bilateral widespread airspace opacities compatible pulmonary edema, or   less likely pneumonia. Left atrial enlargement. 3.  Mediastinal and bilateral hilar lymph node enlargement, nonspecific. RECOMMENDATIONS:   Unavailable                  Echo:  Pending            Labs:  Lab Results   Component Value Date/Time    WBC 5.9 11/05/2022 05:48 AM    HGB 14.8 11/05/2022 05:48 AM    HCT 45.0 11/05/2022 05:48 AM    MCV 89.1 11/05/2022 05:48 AM    MCH 29.3 11/05/2022 05:48 AM    MCHC 32.9 11/05/2022 05:48 AM    RDW 14.8 11/05/2022 05:48 AM     11/05/2022 05:48 AM    MPV 11.0 11/05/2022 05:48 AM     Lab Results   Component Value Date/Time     11/06/2022 06:12 AM    K 4.2 11/06/2022 06:12 AM    K 4.2 11/03/2022 05:36 AM     11/06/2022 06:12 AM    CO2 25 11/06/2022 06:12 AM    BUN 24 11/06/2022 06:12 AM    CREATININE 0.6 11/06/2022 06:12 AM    LABALBU 3.0 11/05/2022 05:48 AM    CALCIUM 8.7 11/06/2022 06:12 AM    GFRAA >60 09/13/2017 03:04 PM    LABGLOM >60 11/06/2022 06:12 AM     No results found for: PROTIME, INR  No results for input(s): PROBNP in the last 72 hours. Recent Labs     11/06/22  0600   PROCAL 0.06       This SmartLink has not been configured with any valid records. Micro:  No results for input(s): CULTRESP in the last 72 hours. No results for input(s): LABGRAM in the last 72 hours. No results for input(s): LEGUR in the last 72 hours. No results for input(s): STREPNEUMAGU in the last 72 hours. No results for input(s): LP1UAG in the last 72 hours.      SARS-COV-2 biomarkers    Recent Labs     11/05/22  0548 11/06/22  0600   CRP  --  1.1*   PROCAL  --  0.06   AST 37*  --    ALT 21  --         Lab Results   Component Value Date/Time VITD25 11 (L) 11/03/2022 05:36 AM        Assessment:    COVID-19 pneumonia, unvaccinated  Acute respiratory failure with hypoxia  Community-acquired pneumonia  Pelvic mass  Unintentional weight loss 68 pounds over 1 year  Ongoing nicotine dependence up to 1 pack/day for 63 years  Fall at home hit head, laceration left side forehead and left arm      Plan:   Patient oxygenation has improved currently down to 1 L/min saturating 92%  Elevated inflammatory markers, fever at home. received tocilizumab x 1, does not meet criteria for remdesivir. Increase dexamethasone decreased down 10 mg daily for 5 more days. vitamins-C, D, and zinc  Levaquin 750 mg daily x 7 days for CAP coverage-last dose 11/10/2022  Bronchodilators via nebulizer with Respirgard to prevent droplet spread. Brovana and budesonide,  duo nebs. Continue guaifenesin TID  Chest imaging reviewed 11/5/22  Incentive spirometer, lung recruitment maneuvers  2D echo pending can be as OP once recovered from COVID-19  PT/OT  DVT, GI prophylaxis  Tobacco cessation counseling  This plan of care was reviewed in collaboration with Dr. Clance Mcburney  Electronically signed by CHRISTINA Barajas CNP on 11/7/2022 at 2:29 PM      I personally saw, examined and provided care for the patient. Radiographs, labs and medication list were reviewed by me independently. I spoke with bedside nursing, therapists and consultants. The case was discussed in detail and plans for care were established. Review of CNP documentation was conducted and revisions were made as appropriate. I agree with the above documented exam, problem list and plan of care.    Iván Sherman MD

## 2022-11-07 NOTE — PROGRESS NOTES
Hospitalist Progress Note            Patient: Christopher Jiang Age: 78 y.o.   DOA: 11/2/2022 Admit Dx / CC: Acute respiratory failure (HCC) [J96.00]  Pelvic mass [R19.00]  Cavitary lesion of lung [J98.4]  Acute respiratory failure with hypoxia (HCC) [J96.01]  COVID [U07.1]  COVID-19 virus infection [U07.1]   LOS:  LOS: 5 days      Assessment/ Plan:     Ms. Christopher Jiang, a 78y.o. year old female  who  has a past medical history of Fluid retention in legs. Patient presented to ED for evaluation of frequent falls for about a week, found to have COVID 19 requiring O2 supplementation and is being followed by PCCM. Generalized weakness/Physical deconditioning with falls 2/2 Acute hypoxic respiratory failure 2/2 COVID-19 in pneumonia, r/o CHF. Also suspect underlying undiagnosed COPD/Longstanding active smoker, improving  Counseld on smoking cessation  Cont O2 supplement and wean off as tolerated, down to 2=1 L, Keep SpO2 >=88% (pt desats at night)  Change IV decadron to PO  change IV levoquin to PO  C/w probiotic  S/p dose of Toci 11/3  Cont lasix 20 mg daily  Nebs  Bronchial hygiene  Echo pend (can be completed as OP)  Encouraged IS and doing well  Recommend OP PFTs  Recommend OP sleep study (ordered)  Pulmo following  PT/OT/Increase acitivty    Obesity, BMI 29    Mildly elevated troponin suspect 2/2 demand  No chest pain. Trend flat and not consistent with NSTEMI    Urinary incontinence with retention ? overflow incontinence   Bladder scans    Left forehead and left upper extremity laceration 2/2 fall  Received tetanus vaccine  Wound care    Possible adrenal adenoma versus adrenal hyperplasia versus adrenal hemorrhage by CT scan report  Recommend OP w/u for Cushings syndrome    Very [place]  She has right-sided hearing aid    Abnormally thickened endometrium  OP f/u with GYN    Vitamin D deficiency  Started replacement    Suspect ROSE/Nocturnal oxygen desaturation  Recommend OP sleep study (ordered)    DVT ppx:  Lovenox  Code status: Full code    Medically stable for discharge to SNF pend precert    Plan of care discussed with: patient and bedside RNJOSE    CT:  Impression   Enhancing area visualized within the endometrial cavity with surrounding low   attenuation worrisome for a mass would recommend further evaluation with   transvaginal pelvic ultrasound. Focal aneurysmal dilatation of the distal abdominal aorta and common iliac   artery. Abdominal aorta maximal transverse diameters of 3.5 x 3.5 cm. Nodularity visualized in the left adrenal gland, differential diagnosis would   include adrenal adenoma, adrenal hyperplasia less likely adrenal hemorrhage. Extensive degenerative bone changes are seen. Small type 1 hiatus hernia. 3.8 cm cavitary lesion visualized in the inferolateral left lower lobe. Prominent chronic interstitial and bronchovascular lung changes is seen. TV US:  Impression   Abnormally thickened endometrium. The apparent enhancing intracavitary mass,   as seen on the prior CT, is not evident by transabdominal sonography. .  Ob   consultation and histologic sampling are recommended.      Patient Active Problem List   Diagnosis    Acute respiratory failure (HCC)    Pneumonia due to COVID-19 virus    Tobacco abuse    Vitamin D deficiency    [place] (hard of hearing)    Noncompliance    Nocturnal oxygen desaturation        Medications:  Reviewed    Infusion Medications    sodium chloride       Scheduled Medications    dexamethasone  6 mg Oral Daily    levoFLOXacin  750 mg Oral Daily    furosemide  20 mg Oral Daily    multivitamin  1 tablet Oral Daily    lactobacillus  2 capsule Oral BID    guaiFENesin  400 mg Oral TID    benzonatate  100 mg Oral TID    melatonin  5 mg Oral Nightly    vitamin D  50,000 Units Oral Weekly    vitamin D  2,000 Units Oral Daily    ascorbic acid  1,000 mg Oral Daily    zinc sulfate  50 mg Oral Daily    ipratropium-albuterol  1 ampule Inhalation Q4H WA    sodium chloride flush  5-40 mL IntraVENous 2 times per day    enoxaparin  40 mg SubCUTAneous BID    Arformoterol Tartrate  15 mcg Nebulization BID    budesonide  250 mcg Nebulization BID     PRN Meds: acetaminophen **OR** [DISCONTINUED] acetaminophen, melatonin, albuterol sulfate HFA, sodium chloride flush, sodium chloride, ondansetron **OR** ondansetron, polyethylene glycol, guaiFENesin-dextromethorphan    I/O    Intake/Output Summary (Last 24 hours) at 11/7/2022 1038  Last data filed at 11/7/2022 0955  Gross per 24 hour   Intake 360 ml   Output 2450 ml   Net -2090 ml       Labs:   Recent Labs     11/05/22 0548   WBC 5.9   HGB 14.8   HCT 45.0          Recent Labs     11/05/22 0548 11/06/22  0612    139   K 4.2 4.2    101   CO2 22 25   BUN 32* 24*   CREATININE 0.7 0.6   CALCIUM 8.4* 8.7       Recent Labs     11/05/22 0548   PROT 5.6*   ALKPHOS 58   ALT 21   AST 37*   BILITOT 0.3       No results for input(s): INR in the last 72 hours. No results for input(s): [place] in the last 72 hours. Chronic labs:  Lab Results   Component Value Date    CHOL 230 (H) 09/13/2017    TRIG 273 (H) 09/13/2017    HDL 37 09/13/2017    LDLCALC 138 (H) 09/13/2017    TSH 0.361 11/04/2022    LABA1C 6.7 (H) 11/04/2022       Radiology:  Imaging studies reviewed today. Subjective:     Fozia Hemphill doing well and using her IS    Objective:     Physical Exam:   /69   Pulse 65   Temp (!) 96.5 °F (35.8 °C) (Oral)   Resp 19   Ht 5' 7\" (1.702 m)   Wt 178 lb 3.2 oz (80.8 kg)   SpO2 95%   BMI 27.91 kg/m²     General appearance:in no distress, up in chair  Lungs: diminished in R base, no wheezing or crackles   Heart: Regular rate and rhythm, S1, S2 normal   Abdomen: Soft, non-tender and not-distended.  Bowel sounds normal.   Extremities: no edema   Neurologic: Grossly normal and non focal, CN II-XII sig for very [facility]      Salvador Kraft MD   Hospitalist Service 11/07/22 10:38 AM

## 2022-11-08 ENCOUNTER — APPOINTMENT (OUTPATIENT)
Dept: GENERAL RADIOLOGY | Age: 77
DRG: 177 | End: 2022-11-08
Payer: COMMERCIAL

## 2022-11-08 LAB
C-REACTIVE PROTEIN: 0.3 MG/DL (ref 0–0.4)
PROCALCITONIN: 0.05 NG/ML (ref 0–0.08)

## 2022-11-08 PROCEDURE — 2140000000 HC CCU INTERMEDIATE R&B

## 2022-11-08 PROCEDURE — 71045 X-RAY EXAM CHEST 1 VIEW: CPT

## 2022-11-08 PROCEDURE — 6370000000 HC RX 637 (ALT 250 FOR IP): Performed by: INTERNAL MEDICINE

## 2022-11-08 PROCEDURE — 2580000003 HC RX 258: Performed by: STUDENT IN AN ORGANIZED HEALTH CARE EDUCATION/TRAINING PROGRAM

## 2022-11-08 PROCEDURE — 6360000002 HC RX W HCPCS: Performed by: STUDENT IN AN ORGANIZED HEALTH CARE EDUCATION/TRAINING PROGRAM

## 2022-11-08 PROCEDURE — 36415 COLL VENOUS BLD VENIPUNCTURE: CPT

## 2022-11-08 PROCEDURE — 94640 AIRWAY INHALATION TREATMENT: CPT

## 2022-11-08 PROCEDURE — 86140 C-REACTIVE PROTEIN: CPT

## 2022-11-08 PROCEDURE — 6370000000 HC RX 637 (ALT 250 FOR IP): Performed by: NURSE PRACTITIONER

## 2022-11-08 PROCEDURE — 97535 SELF CARE MNGMENT TRAINING: CPT

## 2022-11-08 PROCEDURE — 2700000000 HC OXYGEN THERAPY PER DAY

## 2022-11-08 PROCEDURE — 97530 THERAPEUTIC ACTIVITIES: CPT

## 2022-11-08 PROCEDURE — 6360000002 HC RX W HCPCS: Performed by: INTERNAL MEDICINE

## 2022-11-08 PROCEDURE — 84145 PROCALCITONIN (PCT): CPT

## 2022-11-08 RX ADMIN — ONDANSETRON 4 MG: 2 INJECTION INTRAMUSCULAR; INTRAVENOUS at 12:43

## 2022-11-08 RX ADMIN — Medication 50 MG: at 09:33

## 2022-11-08 RX ADMIN — ENOXAPARIN SODIUM 40 MG: 100 INJECTION SUBCUTANEOUS at 09:32

## 2022-11-08 RX ADMIN — BENZONATATE 100 MG: 100 CAPSULE ORAL at 16:01

## 2022-11-08 RX ADMIN — LEVOFLOXACIN 750 MG: 750 TABLET, FILM COATED ORAL at 12:43

## 2022-11-08 RX ADMIN — Medication 10 ML: at 09:34

## 2022-11-08 RX ADMIN — ENOXAPARIN SODIUM 40 MG: 100 INJECTION SUBCUTANEOUS at 22:09

## 2022-11-08 RX ADMIN — ARFORMOTEROL TARTRATE 15 MCG: 15 SOLUTION RESPIRATORY (INHALATION) at 08:53

## 2022-11-08 RX ADMIN — IPRATROPIUM BROMIDE AND ALBUTEROL SULFATE 1 AMPULE: .5; 2.5 SOLUTION RESPIRATORY (INHALATION) at 15:53

## 2022-11-08 RX ADMIN — BUDESONIDE 250 MCG: 0.25 SUSPENSION RESPIRATORY (INHALATION) at 08:53

## 2022-11-08 RX ADMIN — Medication 5 MG: at 22:09

## 2022-11-08 RX ADMIN — GUAIFENESIN 400 MG: 400 TABLET ORAL at 16:01

## 2022-11-08 RX ADMIN — IPRATROPIUM BROMIDE AND ALBUTEROL SULFATE 1 AMPULE: .5; 2.5 SOLUTION RESPIRATORY (INHALATION) at 08:53

## 2022-11-08 RX ADMIN — DEXAMETHASONE 6 MG: 4 TABLET ORAL at 09:32

## 2022-11-08 RX ADMIN — BENZONATATE 100 MG: 100 CAPSULE ORAL at 09:33

## 2022-11-08 RX ADMIN — IPRATROPIUM BROMIDE AND ALBUTEROL SULFATE 1 AMPULE: .5; 2.5 SOLUTION RESPIRATORY (INHALATION) at 12:15

## 2022-11-08 RX ADMIN — GUAIFENESIN 400 MG: 400 TABLET ORAL at 22:09

## 2022-11-08 RX ADMIN — OXYCODONE HYDROCHLORIDE AND ACETAMINOPHEN 1000 MG: 500 TABLET ORAL at 09:32

## 2022-11-08 RX ADMIN — IPRATROPIUM BROMIDE AND ALBUTEROL SULFATE 1 AMPULE: .5; 2.5 SOLUTION RESPIRATORY (INHALATION) at 19:46

## 2022-11-08 RX ADMIN — Medication 2 CAPSULE: at 22:09

## 2022-11-08 RX ADMIN — FUROSEMIDE 20 MG: 20 TABLET ORAL at 09:33

## 2022-11-08 RX ADMIN — ARFORMOTEROL TARTRATE 15 MCG: 15 SOLUTION RESPIRATORY (INHALATION) at 19:46

## 2022-11-08 RX ADMIN — BENZONATATE 100 MG: 100 CAPSULE ORAL at 22:09

## 2022-11-08 RX ADMIN — MULTIVITAMIN TABLET 1 TABLET: TABLET at 12:43

## 2022-11-08 RX ADMIN — GUAIFENESIN 400 MG: 400 TABLET ORAL at 09:32

## 2022-11-08 RX ADMIN — Medication 2000 UNITS: at 09:33

## 2022-11-08 RX ADMIN — Medication 2 CAPSULE: at 09:33

## 2022-11-08 RX ADMIN — BUDESONIDE 250 MCG: 0.25 SUSPENSION RESPIRATORY (INHALATION) at 19:46

## 2022-11-08 NOTE — CARE COORDINATION
Call made to Deyanira Blue at [facility] to check bed availability. She has beds, referral was provided. She will review patient's chart and follow-up regarding acceptance. Patient will require pre-cert. Call made to therapy department for patient to be seen for updated notes to start pre-cert; left message. Ambulance form, 7000 and envelope will need to be completed. 12:30p  Received a message from the liaison, requesting SW check with family regarding the facility location. Call made to patient's granddaughter, Edita Kimble and they requested [facility] because she works there. Updated Deyanira Blue at [facility], she is reviewing referral and will follow-up regarding acceptance.       Oscar Edwards, MSW, LSW [phone]

## 2022-11-08 NOTE — PROGRESS NOTES
Occupational Therapy  OCCUPATIONAL THERAPY TREATMENT SESSION     [address]  [address] [facility], [facility], [address] TREATMENT NOTE      Date:2022  Patient Name: Isabella Sarabia  MRN: [id number]  : 1945  Room: [address] [place] [address]     Per OT Eval:      Evaluating OT: Azalea Marks OTR/L #[id number]      Referring Provider: Edward Andersen MD  Specific Provider Orders/Date: OT eval and treat 22     Diagnosis: Acute respiratory failure ([address] [J96.00]  Pelvic mass [R19.00]  Cavitary lesion of lung [J98.4]  Acute respiratory failure with hypoxia ([address] [J96.01]  COVID [U07.1]  COVID-19 virus infection [U07.1]   Pt admitted to hospital with SOB     Pertinent Medical History:  has a past medical history of Fluid retention in legs.          Precautions:  Fall Risk, Droplet plus (COVID-19),   O2 via HFNC, continuous pulse ox, [place]     Assessment of current deficits    [x] Functional mobility          [x]ADLs           [x] Strength                  []Cognition    [x] Functional transfers        [x] IADLs         [x] Safety Awareness   [x]Endurance    [] Fine Coordination                        [x] Balance      [] Vision/perception   []Sensation      []Gross Motor Coordination            [] ROM           [] Delirium                   [] Motor Control      OT PLAN OF CARE   OT POC based on physician orders, patient diagnosis and results of clinical assessment     Frequency/Duration 1-3 days/wk for 2 weeks PRN   Specific OT Treatment Interventions to include:   * Instruction/training on adapted ADL techniques and AE recommendations to increase functional independence within precautions       * Training on energy conservation strategies, correct breathing pattern and techniques to improve independence/tolerance for self-care routine  * Functional transfer/mobility training/DME recommendations for increased independence, safety, and fall prevention  * Patient/Family education to increase follow through with safety techniques and functional independence  * Recommendation of environmental modifications for increased safety with functional transfers/mobility and ADLs  * Therapeutic exercise to improve motor endurance, ROM, and functional strength for ADLs/functional transfers  * Therapeutic activities to facilitate/challenge dynamic balance, stand tolerance for increased safety and independence with ADLs  * Therapeutic activities to facilitate gross/fine motor skills for increased independence with ADLs  * Neuro-muscular re-education: facilitation of righting/equilibrium reactions, midline orientation, scapular stability/mobility, normalization of muscle tone, and facilitation of volitional active controled movement        Recommended Adaptive Equipment:  TBD      Home Living: Pt lives with granddaughter in a mobile home with 3 DINESH and 1 hand rail    Bathroom setup: tub/shower combo    Equipment owned: none     Prior Level of Function: Independent with ADLs , Independent  with IADLs; ambulated without AD   Driving: yes   Occupation: enjoys crossword puzzles and cards     Pain Level: Pt denies pain this session     Cognition: A&O: 4/4; Follows 1-2 step directions-very [place]             Memory:  good             Sequencing:  good             Problem solving:  fair             Judgement/safety:  fair               Functional Assessment:  AM-PAC Daily Activity Raw Score: 15/24    Initial Eval Status  Date: 11/6/22 Treatment Status  Date:11/8/22 STGs = LTGs  Time frame: 10-14 days   Feeding Independent   Supervision     Grooming Minimal Assist   Min A  Standing at sink Modified [place]    UB Dressing Minimal Assist   Min A Modified [place]    LB Dressing Maximal Assist  Mod A  Socks  Modified [place]    Bathing Moderate Assist   Modified [place]    Toileting Moderate Assist   Mod A  Including hygiene task Modified [place]     Bed Mobility  Supine to sit: Minimal Assist   Sit to supine: Minimal Assist   Supine to sit: SBA   Sit to supine: NT Modified [place]    Functional Transfers Minimal Assist   Min A  Various surfaces Modified [place]    Functional Mobility Minimal Assist   Min A w/ w/w  EOB>bathroom>chair Modified [place]    Balance Sitting:     Static:  SBA    Dynamic:min A  Standing: min A at w/w   Sitting:     Static:  SBA    Dynamic:min A  Standing: min A at w/w      Activity Tolerance F-  Fair- F+   Visual/  Perceptual Glasses: yes  wfl                          Vitals:  3L via HFNC  Rest: O2 sat=^92%  Post Activity: O2 sat 83-90% (+desaturation w/ coughing spell. Quick recovery to 90% once coughing subsided and w/ rest/pursed lip breathing)  Rest: O2 sat  94%    Comments: Upon arrival pt lying in bed. At end of session pt seated in chair all lines and tubes intact, call light within reach. Treatment: Therapist facilitated bed mobility (education/cues for body mechanics), unsupported sitting balance (education/cues for safety, posture, weight shifting, dynamic reaching to prep for ADL's), functional transfers (various surfaces w/ education/cues for safety/hand placement, attention to tasks), standing tolerance tasks (addressing posture, balance and activity tolerance while incorporating light functional reaching impacting ADLs and functional activity) and functional ambulation tasks with w/w (including to/ from bathroom and in preparation for item retrieval tasks w/ education/cuing on posture, w/w management, energy conservation techniques and safety). Therapist also facilitated self-care tasks including: toileting task (increased time required) and standing grooming tasks while educating/cuing pt on modified strategies, posture, safety and energy conservation techniques. Patient demonstrated decreased independence and safety during completion of ADL/functional transfer/mobility tasks.   Pt would benefit from continued skilled OT to increase safety and independence with ADL/IADL tasks for functional independence and quality of life. Pt has made fair progress towards set goals.        Treatment Time In:11:25            Treatment Time Out: 11:50             Treatment Charges: Mins Units   Ther Ex  [id number]     Manual Therapy Fartun Canchola [phone] [id number]   ADL/Home Mgt [id number]   Neuro Re-ed [id number]     Group Therapy      Orthotic manage/training  [id number]     Non-Billable Time     Total Timed Treatment 25 45 Jackelyn Singh, OTR/L #[id number]

## 2022-11-08 NOTE — PROGRESS NOTES
Physical Therapy   Treatment Note    Name: Tyra Thomas  : 1945  MRN: [id number]      Date of Service: 2022    Evaluating PT:  Ellen Molina. Glenn, [address] [place] [address], [id number]    Room #:  5322/0544-A  Diagnosis:  Acute respiratory failure ([facility] [address] [address] [J96.00]  Pelvic mass [R19.00]  Cavitary lesion of lung [J98.4]  Acute respiratory failure with hypoxia ([facility] [address] [J96.01]  COVID [U07.1]  COVID-19 virus infection [U07.1]  PMHx/PSHx:     has a past medical history of Fluid retention in legs. has a past surgical history that includes Tubal ligation; Tonsillectomy; Dental surgery; and Tubal ligation. Precautions:  Fall risk, droplet+ (COVID), 3 L O2  Equipment Needs:  [employer]    SUBJECTIVE:    Pt lives with her granddaughter in a 1 story home with 3 stairs to enter and 1 rail. Pt ambulated with no AD PTA. OBJECTIVE:   Initial Evaluation  Date: 11/3/22 Treatment Date: 22 Short Term/ Long Term   Goals   AM-PAC 6 Clicks  65/46    Was pt agreeable to Eval/treatment? Yes yes    Does pt have pain?  Denied No c/o pain    Bed Mobility  Rolling: Elma  Supine to sit: ModA  Sit to supine: SBA  Scooting: MaxA Rolling: SBA  Supine to sit: SBA  Sit to supine: NT  Scooting: SBA Modified Independent    Transfers Sit to stand: ModA  Stand to sit: ModA  Stand pivot: NT Sit to stand: Elma  Stand to sit: Elma  Stand pivot: Elma SBA    Ambulation    5 feet forward and backward with Elma 20', 20' Elma WW  Spo2 83% on 3 L recovered with seated rest.  >50 feet with AAD with SBA   Stair negotiation: ascended and descended  NT NT >4 steps with 1 rail with SBA   BLE ROM [facility]    BLE Strength Grossly 4/5     Balance Sitting: Supervision  Standing: Min/ModA Sitting: Supervision  Standing: Elma Sitting: Independent   Standing: SBA     Pt is A & O x 4  Sensation:  Denied numbness/tingling  Edema:  None noted in BLE    Therapeutic Exercises:    Ambulation:Functional mobility    Patient education  [facility], bed mobility, transfer technique, benefits of mobilization. Patient response to education:   Pt verbalized understanding Pt demonstrated skill Pt requires further education in this area   Yes Yes Reinforce     ASSESSMENT:    Conditions Requiring Skilled Therapeutic Intervention:    [x]Decreased strength     []Decreased ROM  [x]Decreased functional mobility  [x]Decreased balance   [x]Decreased endurance   []Decreased posture  []Decreased sensation  []Decreased coordination   []Decreased vision  [x]Decreased safety awareness   []Increased pain       Comments:  Pt agreeable to PT. Pt spo2 94% on 3 L at rest. Pt with occassional coughing spells causing Spo2 desaturations during mobility. Pt is very [place]. Gait pattern unsteady with cues for [employer] approximation. Pt transferred to toilet to void. Patient would benefit from continued skilled PT to maximize functional mobility independence. Treatment:  Patient practiced and was instructed in the following treatment:    Bed mobility- verbal cues to facilitate independence  Functional transfers-Verbal cues for proper positioning and sequencing to perform transfers safely with maximum independence. Gait training- Verbal cues for proper positioning and sequencing using assistive device to maximize functional mobility independence. Pt's/ family goals   1. Return home    Prognosis is good for reaching above PT goals. Patient and or family understand(s) diagnosis, prognosis, and plan of care.   Yes    PHYSICAL THERAPY PLAN OF CARE:    PT POC is established based on physician order and patient diagnosis     Referring provider/PT Order:  Gloria Carty MD  Diagnosis:  Acute respiratory failure ([address] [J96.00]  Pelvic mass [R19.00]  Cavitary lesion of lung [J98.4]  Acute respiratory failure with hypoxia ([address] [J96.01]  COVID [U07.1]  COVID-19 virus infection [U07.1]  Specific instructions for next treatment:  Progress ambulation as able    Current Treatment Recommendations:     [x] Strengthening to improve independence with functional mobility   [] ROM to improve independence with functional mobility   [x] Balance Training to improve static/dynamic balance and to reduce fall risk  [x] Endurance Training to improve activity tolerance during functional mobility   [x] Transfer Training to improve safety and independence with all functional transfers   [x] Gait Training to improve gait mechanics, endurance and assess need for appropriate assistive device  [x] Stair Training in preparation for safe discharge home and/or into the community   [] Positioning to prevent skin breakdown and contractures  [x] Safety and Education Training   [] Patient/Caregiver Education   [] HEP  [] Other     PT long term treatment goals are located in above grid    Frequency of treatments: 2-5x/week x 1-2 weeks. Time in  1125  Time out  1150    Total Treatment Time  25 minutes     Evaluation Time includes thorough review of current medical information, gathering information on past medical history/social history and prior level of function, completion of standardized testing/informal observation of tasks, assessment of data and education on plan of care and goals.     CPT codes:  [] Low Complexity PT evaluation 12342  [] Moderate Complexity PT evaluation 90401  [] High Complexity PT evaluation 59219  [] PT Re-evaluation 14338  [] Gait training 43275 0 minutes  [] Manual therapy 38791 0 minutes  [x] Therapeutic activities 71858 10 minutes  [] Therapeutic exercises 21626 0 minutes  [] Neuromuscular reeducation 85556 0 minutes       Shai Pruitt PT, DPT  [id number]

## 2022-11-08 NOTE — PROGRESS NOTES
Dk Hernandez M.D.,[facility]  Minta Najjar, D.O., F.A.C.O.I., Martha Bradshaw M.D. Maikol Olmos M.D. Marc Cedeño D.O. Daily Pulmonary Progress Note    Patient:  Ena Marshall 78 y.o. female MRN: [id number]     Date of Service: 11/8/2022      Synopsis     We are following patient for covid pneumonia    \"CC\" fall     Code status: full       Subjective      Patient is feeling better. Sitting up in chair. Oxygen requirements better, O2 almost weaned off. Now on 2 liters. Cough improving. Encouraged to use incentive spirometer. Review of Systems:  Constitutional: Denies fever, weight loss, night sweats, and fatigue  Skin: Denies pigmentation, dark lesions, and rashes   HEENT: Denies hearing loss, tinnitus, ear drainage, epistaxis, sore throat, and hoarseness. Cardiovascular: Denies palpitations, chest pain, and chest pressure.   Respiratory:  cough is improved  Gastrointestinal: Denies nausea, vomiting, poor appetite, diarrhea, heartburn or reflux  Genitourinary: Denies dysuria, frequency, urgency or hematuria  Musculoskeletal: Denies myalgias, muscle weakness, and bone pain  Neurological: Denies dizziness, vertigo, headache, and focal weakness  Psychological: Denies anxiety and depression  Endocrine: Denies heat intolerance and cold intolerance  Hematopoietic/Lymphatic: Denies bleeding problems and blood transfusions    24-hour events:  None    Objective   Vitals: BP (!) 140/83   Pulse 83   Temp 97.6 °F (36.4 °C) (Oral)   Resp 20   Ht 5' 7\" (1.702 m)   Wt 178 lb 3.2 oz (80.8 kg)   SpO2 93%   BMI 27.91 kg/m²     I/O:    Intake/Output Summary (Last 24 hours) at 11/8/2022 1313  Last data filed at 11/8/2022 1302  Gross per 24 hour   Intake 240 ml   Output --   Net 240 ml                          CURRENT MEDS :  Scheduled Meds:   dexamethasone  6 mg Oral Daily    levoFLOXacin  750 mg Oral Daily    furosemide  20 mg Oral Daily    multivitamin  1 tablet Oral Daily    lactobacillus  2 capsule Oral BID    guaiFENesin  400 mg Oral TID    benzonatate  100 mg Oral TID    melatonin  5 mg Oral Nightly    vitamin D  50,000 Units Oral Weekly    vitamin D  2,000 Units Oral Daily    ascorbic acid  1,000 mg Oral Daily    zinc sulfate  50 mg Oral Daily    ipratropium-albuterol  1 ampule Inhalation Q4H WA    sodium chloride flush  5-40 mL IntraVENous 2 times per day    enoxaparin  40 mg SubCUTAneous BID    Arformoterol Tartrate  15 mcg Nebulization BID    budesonide  250 mcg Nebulization BID       Physical Exam:   General: The patient is lying in bed comfortably without any distress. Breathing is not labored  HEENT: Pupils are equal round and reactive to light, there are no oral lesions and no post-nasal drip   Neck: supple without adenopathy  Cardiovascular: regular rate and rhythm without murmur or gallop  Respiratory: Few crackles bilaterally to auscultation bilaterally without wheezing or crackles. Air entry is symmetric  Abdomen: soft, non-tender, non-distended, normal bowel sounds  Extremities: warm, no edema, no clubbing  Skin: no rash or lesion  Neurologic: CN II-XII grossly intact, no focal deficits    Pertinent/ New Labs and Imaging Studies     Imaging Personally Reviewed:  11/5/22 cxr    FINDINGS:   The cardiac silhouette is normal in size. There are bilateral pulmonary   infiltrates. This finding has increased on the left since the prior exam.   No pneumothorax or definite pleural effusion is seen. Impression   Bilateral pulmonary infiltrates, increased on the left since 11/02/2022. CTA chest  FINDINGS:   Pulmonary Arteries: Upper normal in caliber. No PE. Lungs and pleura: Bilateral lower lobe and perihilar airspace opacities   compatible with pulmonary edema, or less likely, pneumonia. Bilateral upper   lobe scattered ground-glass opacities. No pleural effusion or pneumothorax.        Heart and mediastinum: Borderline cardiomegaly and with mild left atrial enlargement. No pericardial effusion. Clustered subcarinal lymph nodes   measuring 1.7 cm in AP dimension. Multiple additional mildly enlarged   mediastinal lymph nodes and bilateral hilar nodes. Left axillary 1.9 cm node   versus epithelial inclusion cyst.  Normal right axilla. Atherosclerotic   thoracic aorta which is normal in caliber. Upper abdomen: Please see separate report       Bones: No acute osseous abnormality. Impression   1. No PE.       2.  Bilateral widespread airspace opacities compatible pulmonary edema, or   less likely pneumonia. Left atrial enlargement. 3.  Mediastinal and bilateral hilar lymph node enlargement, nonspecific. RECOMMENDATIONS:   Unavailable                  Echo:  Pending            Labs:  Lab Results   Component Value Date/Time    WBC 5.9 11/05/2022 05:48 AM    HGB 14.8 11/05/2022 05:48 AM    HCT 45.0 11/05/2022 05:48 AM    MCV 89.1 11/05/2022 05:48 AM    MCH 29.3 11/05/2022 05:48 AM    MCHC 32.9 11/05/2022 05:48 AM    RDW 14.8 11/05/2022 05:48 AM     11/05/2022 05:48 AM    MPV 11.0 11/05/2022 05:48 AM     Lab Results   Component Value Date/Time     11/06/2022 06:12 AM    K 4.2 11/06/2022 06:12 AM    K 4.2 11/03/2022 05:36 AM     11/06/2022 06:12 AM    CO2 25 11/06/2022 06:12 AM    BUN 24 11/06/2022 06:12 AM    CREATININE 0.6 11/06/2022 06:12 AM    LABALBU 3.0 11/05/2022 05:48 AM    CALCIUM 8.7 11/06/2022 06:12 AM    GFRAA >60 09/13/2017 03:04 PM    LABGLOM >60 11/06/2022 06:12 AM     No results found for: PROTIME, INR  No results for input(s): PROBNP in the last 72 hours. Recent Labs     11/06/22  0600   PROCAL 0.06       This SmartLink has not been configured with any valid records. Micro:  No results for input(s): CULTRESP in the last 72 hours. No results for input(s): LABGRAM in the last 72 hours. No results for input(s): LEGUR in the last 72 hours.   No results for input(s): STREPNEUMAGU in the last 72 hours. No results for input(s): LP1UAG in the last 72 hours. SARS-COV-2 biomarkers    Recent Labs     11/06/22  0600   CRP 1.1*   PROCAL 0.06        Lab Results   Component Value Date/Time    VITD25 11 (L) 11/03/2022 05:36 AM     Ultrasound 11/2/22   Abnormally thickened endometrium. The apparent enhancing intracavitary mass,   as seen on the prior CT, is not evident by transabdominal sonography. .  Ob   consultation and histologic sampling are recommended. Assessment:    COVID-19 pneumonia, unvaccinated  Acute respiratory failure with hypoxia  Community-acquired pneumonia  Unintentional weight loss 68 pounds over 1 year  Ongoing nicotine dependence up to 1 pack/day for 63 years  Fall at home hit head, laceration left side forehead and left arm      Plan:   Patient oxygenation has improved currently 2 L/min saturating 92%  Elevated inflammatory markers, fever at home. received tocilizumab x 1, does not meet criteria for remdesivir. Increase dexamethasone decreased down 10 mg daily for 5 more days. vitamins-C, D, and zinc  Levaquin 750 mg daily x 7 days for CAP coverage-last dose 11/10/2022  Bronchodilators via nebulizer with Respirgard to prevent droplet spread. Brovana and budesonide,  duo nebs. Continue guaifenesin TID  Chest imaging reviewed 11/5/22  Incentive spirometer, lung recruitment maneuvers  2D echo pending can be as OP once recovered from COVID-19  PT/OT  DVT, GI prophylaxis  Tobacco cessation counseling  Ok to dc from a pulmonary perspective. Will arrange OP follow up CT chest 6-8 wks, message routed. This plan of care was reviewed in collaboration with Dr. Sonia Lozano  Electronically signed by CHRISTINA Saunders CNP on 11/8/2022 at 1:13 PM       I personally saw, examined and provided care for the patient. Radiographs, labs and medication list were reviewed by me independently. I spoke with bedside nursing, therapists and consultants.   The case was discussed in detail and plans for care were established. Review of CNP documentation was conducted and revisions were made as appropriate. I agree with the above documented exam, problem list and plan of care.    Marta Madrigal MD

## 2022-11-08 NOTE — PROGRESS NOTES
Hospitalist Progress Note      PCP: No primary care provider on file. Date of Admission: 11/2/2022  Days in the hospital: [address] [address] Course:   Ms. Elvin Chaparro, a 78y.o. year old female  who  has a past medical history of Fluid retention in legs. Patient presented to ED for evaluation of frequent falls for about a week, found to have COVID 19 requiring O2 supplementation. Patient also seen by pulmonary. She also was noted to have pneumonia for which she was placed on empiric antibiotics. Patient was started on dexamethasone and she is deemed not a candidate for remdesivir. Initially she was on 12 L oxygen and slowly titrated down to 2 L. There was concern for endometrial mass but pelvic ultrasound did not show any evidence of mass and recommended follow-up with gynecology as outpatient. There was also concern for cavitary lung lesion and I did discuss with pulmonary and imaging studies reviewed with our pulmonologist and no evidence of cavitary lesion per them and recommended outpatient follow-up. Subjective  Patient seen and examined at bedside. Denies any headache, dizziness, chest pain. Afebrile. Chart reviewed, overnight events reviewed. Exam:    BP (!) 140/83   Pulse 89   Temp 97.6 °F (36.4 °C) (Oral)   Resp 16   Ht 5' 7\" (1.702 m)   Wt 178 lb 3.2 oz (80.8 kg)   SpO2 90%   BMI 27.91 kg/m²     HEENT: No pallor, no icterus. Respiratory:  CTA, good air entry. Cardiovascular: RRR, no murmur. Abdomen: Soft, non-tender, BS noted. Musculoskeletal: No joint pains or joint swelling noted.    Neurologic: awake, alert and following commands       Assessment/Plan:    COVID 19 pneumonia, unvaccinated, currently on 2 L oxygen, continue with dexamethasone, monitor inflammatory markers, received tocilizumab x1, not a candidate for remdesivir    Acute respiratory failure, continue O2 and titrate as tolerated    Pneumonia, on Levaquin until 11/10/2022, continue bronchodilators, repeat CT chest to be done in 6 to 8 weeks per pulmonary    Endometrial thickening and intracavitary mass on CT not appreciated on pelvic ultrasound, recommended outpatient follow-up with gynecology    Possible adrenal adenoma versus hyperplasia, outpatient work-up for Cushing syndrome recommended    Awaiting pre-CERT for placement      Labs:   No results for input(s): WBC, HGB, HCT, PLT in the last 72 hours. Recent Labs     11/06/22  0612      K 4.2      CO2 25   BUN 24*   CREATININE 0.6   CALCIUM 8.7     No results for input(s): AST, ALT, BILIDIR, BILITOT, ALKPHOS in the last 72 hours. No results for input(s): INR in the last 72 hours. No results for input(s): Paty [place] in the last 72 hours. Medications:  Reviewed    Infusion Medications    sodium chloride       Scheduled Medications    dexamethasone  6 mg Oral Daily    levoFLOXacin  750 mg Oral Daily    furosemide  20 mg Oral Daily    multivitamin  1 tablet Oral Daily    lactobacillus  2 capsule Oral BID    guaiFENesin  400 mg Oral TID    benzonatate  100 mg Oral TID    melatonin  5 mg Oral Nightly    vitamin D  50,000 Units Oral Weekly    vitamin D  2,000 Units Oral Daily    ascorbic acid  1,000 mg Oral Daily    zinc sulfate  50 mg Oral Daily    ipratropium-albuterol  1 ampule Inhalation Q4H WA    sodium chloride flush  5-40 mL IntraVENous 2 times per day    enoxaparin  40 mg SubCUTAneous BID    Arformoterol Tartrate  15 mcg Nebulization BID    budesonide  250 mcg Nebulization BID     PRN Meds: acetaminophen **OR** [DISCONTINUED] acetaminophen, melatonin, albuterol sulfate HFA, sodium chloride flush, sodium chloride, ondansetron **OR** ondansetron, polyethylene glycol, guaiFENesin-dextromethorphan    No intake or output data in the 24 hours ending 11/08/22 1044  Body mass index is 27.91 kg/m². Diet  ADULT DIET; Regular;  Low Sodium (2 gm); 1500 ml    Code Status  Full Code       Electronically signed by Fernando Conn MD on 11/8/2022 at 10:44 AM  [employer]   Please contact me through perfect serve    NOTE: This report was transcribed using voice recognition software. Every effort was made to ensure accuracy; however, inadvertent computerized transcription errors may be present.

## 2022-11-09 LAB — METER GLUCOSE: 122 MG/DL (ref 74–99)

## 2022-11-09 PROCEDURE — 94640 AIRWAY INHALATION TREATMENT: CPT

## 2022-11-09 PROCEDURE — 2140000000 HC CCU INTERMEDIATE R&B

## 2022-11-09 PROCEDURE — 2580000003 HC RX 258: Performed by: STUDENT IN AN ORGANIZED HEALTH CARE EDUCATION/TRAINING PROGRAM

## 2022-11-09 PROCEDURE — 6370000000 HC RX 637 (ALT 250 FOR IP): Performed by: NURSE PRACTITIONER

## 2022-11-09 PROCEDURE — 2700000000 HC OXYGEN THERAPY PER DAY

## 2022-11-09 PROCEDURE — 6370000000 HC RX 637 (ALT 250 FOR IP): Performed by: INTERNAL MEDICINE

## 2022-11-09 PROCEDURE — 6360000002 HC RX W HCPCS: Performed by: STUDENT IN AN ORGANIZED HEALTH CARE EDUCATION/TRAINING PROGRAM

## 2022-11-09 PROCEDURE — 82962 GLUCOSE BLOOD TEST: CPT

## 2022-11-09 PROCEDURE — 6360000002 HC RX W HCPCS: Performed by: INTERNAL MEDICINE

## 2022-11-09 RX ADMIN — ENOXAPARIN SODIUM 40 MG: 100 INJECTION SUBCUTANEOUS at 09:56

## 2022-11-09 RX ADMIN — GUAIFENESIN 400 MG: 400 TABLET ORAL at 21:56

## 2022-11-09 RX ADMIN — BENZONATATE 100 MG: 100 CAPSULE ORAL at 14:35

## 2022-11-09 RX ADMIN — FUROSEMIDE 20 MG: 20 TABLET ORAL at 09:56

## 2022-11-09 RX ADMIN — MULTIVITAMIN TABLET 1 TABLET: TABLET at 09:56

## 2022-11-09 RX ADMIN — BENZONATATE 100 MG: 100 CAPSULE ORAL at 09:56

## 2022-11-09 RX ADMIN — Medication 2000 UNITS: at 10:15

## 2022-11-09 RX ADMIN — ENOXAPARIN SODIUM 40 MG: 100 INJECTION SUBCUTANEOUS at 21:56

## 2022-11-09 RX ADMIN — BENZONATATE 100 MG: 100 CAPSULE ORAL at 21:57

## 2022-11-09 RX ADMIN — BUDESONIDE 250 MCG: 0.25 SUSPENSION RESPIRATORY (INHALATION) at 21:01

## 2022-11-09 RX ADMIN — Medication 10 ML: at 21:58

## 2022-11-09 RX ADMIN — Medication 50 MG: at 10:15

## 2022-11-09 RX ADMIN — Medication 10 ML: at 09:57

## 2022-11-09 RX ADMIN — IPRATROPIUM BROMIDE AND ALBUTEROL SULFATE 1 AMPULE: .5; 2.5 SOLUTION RESPIRATORY (INHALATION) at 17:24

## 2022-11-09 RX ADMIN — ARFORMOTEROL TARTRATE 15 MCG: 15 SOLUTION RESPIRATORY (INHALATION) at 09:03

## 2022-11-09 RX ADMIN — GUAIFENESIN 400 MG: 400 TABLET ORAL at 14:35

## 2022-11-09 RX ADMIN — IPRATROPIUM BROMIDE AND ALBUTEROL SULFATE 1 AMPULE: .5; 2.5 SOLUTION RESPIRATORY (INHALATION) at 12:31

## 2022-11-09 RX ADMIN — LEVOFLOXACIN 750 MG: 750 TABLET, FILM COATED ORAL at 09:56

## 2022-11-09 RX ADMIN — ARFORMOTEROL TARTRATE 15 MCG: 15 SOLUTION RESPIRATORY (INHALATION) at 21:01

## 2022-11-09 RX ADMIN — BUDESONIDE 250 MCG: 0.25 SUSPENSION RESPIRATORY (INHALATION) at 09:03

## 2022-11-09 RX ADMIN — IPRATROPIUM BROMIDE AND ALBUTEROL SULFATE 1 AMPULE: .5; 2.5 SOLUTION RESPIRATORY (INHALATION) at 09:03

## 2022-11-09 RX ADMIN — IPRATROPIUM BROMIDE AND ALBUTEROL SULFATE 1 AMPULE: .5; 2.5 SOLUTION RESPIRATORY (INHALATION) at 21:01

## 2022-11-09 RX ADMIN — OXYCODONE HYDROCHLORIDE AND ACETAMINOPHEN 1000 MG: 500 TABLET ORAL at 09:56

## 2022-11-09 RX ADMIN — Medication 2 CAPSULE: at 09:56

## 2022-11-09 RX ADMIN — Medication 5 MG: at 21:56

## 2022-11-09 RX ADMIN — DEXAMETHASONE 6 MG: 4 TABLET ORAL at 09:57

## 2022-11-09 NOTE — PROGRESS NOTES
Hospitalist Progress Note      PCP: No primary care provider on file. Date of Admission: 11/2/2022  Days in the hospital: 7    Hospital Course:   Ms. Isaura Reyna, a 78y.o. year old female  who  has a past medical history of Fluid retention in legs. Patient presented to ED for evaluation of frequent falls for about a week, found to have COVID 19 requiring O2 supplementation. Patient also seen by pulmonary. She also was noted to have pneumonia for which she was placed on empiric antibiotics. Patient was started on dexamethasone and she is deemed not a candidate for remdesivir. Initially she was on 12 L oxygen and slowly titrated down to 2 L. There was concern for endometrial mass but pelvic ultrasound did not show any evidence of mass and recommended follow-up with gynecology as outpatient. There was also concern for cavitary lung lesion and I did discuss with pulmonary and imaging studies reviewed with our pulmonologist and no evidence of cavitary lesion per them and recommended outpatient follow-up. Subjective  Patient seen and examined at bedside. Denies any headache, dizziness, chest pain. Afebrile. Chart reviewed, overnight events reviewed. Awaiting pre-CERT for placement. Exam:    BP (!) 138/95   Pulse 74   Temp 97.7 °F (36.5 °C) (Oral)   Resp 18   Ht 5' 7\" (1.702 m)   Wt 181 lb 12.8 oz (82.5 kg)   SpO2 94%   BMI 28.47 kg/m²     HEENT: No pallor, no icterus. Respiratory:  CTA, good air entry. Cardiovascular: RRR, no murmur. Abdomen: Soft, non-tender, BS noted. Musculoskeletal: No joint pains or joint swelling noted.    Neurologic: awake, alert and following commands         Assessment/Plan:   COVID 19 pneumonia, unvaccinated, currently on 2-3 L oxygen, continue with dexamethasone, monitor inflammatory markers, received tocilizumab x1, not a candidate for remdesivir    Acute respiratory failure, continue O2 and titrate as tolerated    Pneumonia, on Levaquin until 11/10/2022, continue bronchodilators, repeat CT chest to be done in 6 to 8 weeks per pulmonary    Endometrial thickening and intracavitary mass on CT not appreciated on pelvic ultrasound, recommended outpatient follow-up with gynecology    Possible adrenal adenoma versus hyperplasia, outpatient work-up for Cushing syndrome recommended    Awaiting pre-CERT for placement      Labs:   No results for input(s): WBC, HGB, HCT, PLT in the last 72 hours. No results for input(s): NA, K, CL, CO2, BUN, CREATININE, CALCIUM, PHOS in the last 72 hours. Invalid input(s): MAGNES  No results for input(s): AST, ALT, BILIDIR, BILITOT, ALKPHOS in the last 72 hours. No results for input(s): INR in the last 72 hours. No results for input(s): [place] in the last 72 hours.     Medications:  Reviewed    Infusion Medications    sodium chloride       Scheduled Medications    dexamethasone  6 mg Oral Daily    levoFLOXacin  750 mg Oral Daily    furosemide  20 mg Oral Daily    multivitamin  1 tablet Oral Daily    lactobacillus  2 capsule Oral BID    guaiFENesin  400 mg Oral TID    benzonatate  100 mg Oral TID    melatonin  5 mg Oral Nightly    vitamin D  50,000 Units Oral Weekly    vitamin D  2,000 Units Oral Daily    ascorbic acid  1,000 mg Oral Daily    zinc sulfate  50 mg Oral Daily    ipratropium-albuterol  1 ampule Inhalation Q4H WA    sodium chloride flush  5-40 mL IntraVENous 2 times per day    enoxaparin  40 mg SubCUTAneous BID    Arformoterol Tartrate  15 mcg Nebulization BID    budesonide  250 mcg Nebulization BID     PRN Meds: acetaminophen **OR** [DISCONTINUED] acetaminophen, melatonin, albuterol sulfate HFA, sodium chloride flush, sodium chloride, ondansetron **OR** ondansetron, polyethylene glycol, guaiFENesin-dextromethorphan      Intake/Output Summary (Last 24 hours) at 11/9/2022 0950  Last data filed at 11/9/2022 0920  Gross per 24 hour   Intake 920 ml   Output --   Net 920 ml     Body mass index is 28.47 kg/m². Diet  ADULT DIET; Regular; Low Sodium (2 gm); 1500 ml    Code Status  Full Code       Electronically signed by Mohit Fishman MD on 11/9/2022 at 9:50 AM  [employer]   Please contact me through perfect serve    NOTE: This report was transcribed using voice recognition software. Every effort was made to ensure accuracy; however, inadvertent computerized transcription errors may be present.

## 2022-11-09 NOTE — PROGRESS NOTES
Rory Hutton M.D.,[facility]  Mary Marti D.O., F.A.C.O.I., Vinny Christopher M.D. Alejandro Zimmer M.D. Sheree Gonzales D.O. Daily Pulmonary Progress Note    Patient:  Tabitha Rivera 78 y.o. female MRN: [id number]     Date of Service: 11/9/2022      Synopsis     We are following patient for covid pneumonia    \"CC\" fall     Code status: full       Subjective      Patient is feeling better. Sitting up in chair. Oxygen requirements better, O2 almost weaned off. Now on 2 liters. Cough improving. Encouraged to use incentive spirometer. Review of Systems:  Constitutional: Denies fever, weight loss, night sweats, and fatigue  Skin: Denies pigmentation, dark lesions, and rashes   HEENT: Denies hearing loss, tinnitus, ear drainage, epistaxis, sore throat, and hoarseness. Cardiovascular: Denies palpitations, chest pain, and chest pressure.   Respiratory:  cough is improved  Gastrointestinal: Denies nausea, vomiting, poor appetite, diarrhea, heartburn or reflux  Genitourinary: Denies dysuria, frequency, urgency or hematuria  Musculoskeletal: Denies myalgias, muscle weakness, and bone pain  Neurological: Denies dizziness, vertigo, headache, and focal weakness  Psychological: Denies anxiety and depression  Endocrine: Denies heat intolerance and cold intolerance  Hematopoietic/Lymphatic: Denies bleeding problems and blood transfusions    24-hour events:  None    Objective   Vitals: BP (!) 138/95   Pulse 74   Temp 97.7 °F (36.5 °C) (Oral)   Resp 18   Ht 5' 7\" (1.702 m)   Wt 181 lb 12.8 oz (82.5 kg)   SpO2 94%   BMI 28.47 kg/m²     I/O:    Intake/Output Summary (Last 24 hours) at 11/9/2022 3161  Last data filed at 11/9/2022 0920  Gross per 24 hour   Intake 920 ml   Output --   Net 920 ml                          CURRENT MEDS :  Scheduled Meds:   dexamethasone  6 mg Oral Daily    levoFLOXacin  750 mg Oral Daily    furosemide  20 mg Oral Daily    multivitamin  1 tablet Oral Daily    lactobacillus 2 capsule Oral BID    guaiFENesin  400 mg Oral TID    benzonatate  100 mg Oral TID    melatonin  5 mg Oral Nightly    vitamin D  50,000 Units Oral Weekly    vitamin D  2,000 Units Oral Daily    ascorbic acid  1,000 mg Oral Daily    zinc sulfate  50 mg Oral Daily    ipratropium-albuterol  1 ampule Inhalation Q4H WA    sodium chloride flush  5-40 mL IntraVENous 2 times per day    enoxaparin  40 mg SubCUTAneous BID    Arformoterol Tartrate  15 mcg Nebulization BID    budesonide  250 mcg Nebulization BID       Physical Exam:   General: The patient is lying in bed comfortably without any distress. Breathing is not labored  HEENT: Pupils are equal round and reactive to light, there are no oral lesions and no post-nasal drip   Neck: supple without adenopathy  Cardiovascular: regular rate and rhythm without murmur or gallop  Respiratory: Few crackles bilaterally to auscultation bilaterally without wheezing or crackles. Air entry is symmetric  Abdomen: soft, non-tender, non-distended, normal bowel sounds  Extremities: warm, no edema, no clubbing  Skin: no rash or lesion  Neurologic: CN II-XII grossly intact, no focal deficits    Pertinent/ New Labs and Imaging Studies     Imaging Personally Reviewed:  11/8/22 cxr     FINDINGS:   Compared to the radiograph from 11/05/2022 is mild decrease in the bilateral   pulmonary infiltrates. Bibasilar pulmonary infiltrates persists. The   cardiomediastinal contour is unremarkable. No pneumothorax or pleural   effusion is seen. Impression   Improvement in the bilateral pulmonary infiltrates since 11/05/2022. CTA chest  FINDINGS:   Pulmonary Arteries: Upper normal in caliber. No PE. Lungs and pleura: Bilateral lower lobe and perihilar airspace opacities   compatible with pulmonary edema, or less likely, pneumonia. Bilateral upper   lobe scattered ground-glass opacities. No pleural effusion or pneumothorax.        Heart and mediastinum: Borderline cardiomegaly STREPNEUMAGU in the last 72 hours. No results for input(s): LP1UAG in the last 72 hours. SARS-COV-2 biomarkers    Recent Labs     11/08/22  1024   CRP 0.3   PROCAL 0.05        Lab Results   Component Value Date/Time    VITD25 11 (L) 11/03/2022 05:36 AM     Ultrasound 11/2/22   Abnormally thickened endometrium. The apparent enhancing intracavitary mass,   as seen on the prior CT, is not evident by transabdominal sonography. .  Ob   consultation and histologic sampling are recommended. Assessment:    COVID-19 pneumonia, unvaccinated  Acute respiratory failure with hypoxia  Community-acquired pneumonia  Unintentional weight loss 68 pounds over 1 year  Ongoing nicotine dependence up to 1 pack/day for 63 years  Fall at home hit head, laceration left side forehead and left arm      Plan:   Patient oxygenation has improved currently 2 L/min saturating 92%  Elevated inflammatory markers, fever at home. received tocilizumab x 1, does not meet criteria for remdesivir. Increase dexamethasone decreased down 10 mg daily for 5 more days. vitamins-C, D, and zinc  Levaquin 750 mg daily x 7 days for CAP coverage-last dose 11/10/2022  Bronchodilators via nebulizer with Respirgard to prevent droplet spread. Brovana and budesonide,  duo nebs. Continue guaifenesin TID  Chest imaging reviewed 11/5/22  Incentive spirometer, lung recruitment maneuvers  2D echo pending can be as OP once recovered from COVID-19  PT/OT  DVT, GI prophylaxis  Tobacco cessation counseling  Ok to dc from a pulmonary perspective. Will arrange OP follow up CT chest 6-8 wks, message routed. This plan of care was reviewed in collaboration with Dr. Michelle Whaley  Electronically signed by CHRISTINA Thompson CNP on 11/9/2022 at 9:52 AM         I personally saw, examined and provided care for the patient. Radiographs, labs and medication list were reviewed by me independently. I spoke with bedside nursing, therapists and consultants.   The case was discussed in detail and plans for care were established. Review of CNP documentation was conducted and revisions were made as appropriate. I agree with the above documented exam, problem list and plan of care.    Saintclair Allan, MD

## 2022-11-10 VITALS
HEIGHT: 67 IN | OXYGEN SATURATION: 96 % | RESPIRATION RATE: 17 BRPM | WEIGHT: 181 LBS | HEART RATE: 67 BPM | SYSTOLIC BLOOD PRESSURE: 117 MMHG | TEMPERATURE: 98.1 F | DIASTOLIC BLOOD PRESSURE: 69 MMHG | BODY MASS INDEX: 28.41 KG/M2

## 2022-11-10 PROCEDURE — 2700000000 HC OXYGEN THERAPY PER DAY

## 2022-11-10 PROCEDURE — 6370000000 HC RX 637 (ALT 250 FOR IP): Performed by: NURSE PRACTITIONER

## 2022-11-10 PROCEDURE — 2580000003 HC RX 258: Performed by: STUDENT IN AN ORGANIZED HEALTH CARE EDUCATION/TRAINING PROGRAM

## 2022-11-10 PROCEDURE — 6360000002 HC RX W HCPCS: Performed by: STUDENT IN AN ORGANIZED HEALTH CARE EDUCATION/TRAINING PROGRAM

## 2022-11-10 PROCEDURE — 6360000002 HC RX W HCPCS: Performed by: INTERNAL MEDICINE

## 2022-11-10 PROCEDURE — 6370000000 HC RX 637 (ALT 250 FOR IP): Performed by: INTERNAL MEDICINE

## 2022-11-10 RX ADMIN — Medication 2000 UNITS: at 08:29

## 2022-11-10 RX ADMIN — ENOXAPARIN SODIUM 40 MG: 100 INJECTION SUBCUTANEOUS at 08:30

## 2022-11-10 RX ADMIN — ERGOCALCIFEROL 50000 UNITS: 1.25 CAPSULE ORAL at 09:01

## 2022-11-10 RX ADMIN — GUAIFENESIN 400 MG: 400 TABLET ORAL at 08:29

## 2022-11-10 RX ADMIN — Medication 2 CAPSULE: at 08:30

## 2022-11-10 RX ADMIN — Medication 50 MG: at 08:30

## 2022-11-10 RX ADMIN — BENZONATATE 100 MG: 100 CAPSULE ORAL at 08:30

## 2022-11-10 RX ADMIN — OXYCODONE HYDROCHLORIDE AND ACETAMINOPHEN 1000 MG: 500 TABLET ORAL at 08:29

## 2022-11-10 RX ADMIN — Medication 10 ML: at 08:30

## 2022-11-10 RX ADMIN — LEVOFLOXACIN 750 MG: 750 TABLET, FILM COATED ORAL at 09:01

## 2022-11-10 RX ADMIN — DEXAMETHASONE 6 MG: 4 TABLET ORAL at 08:29

## 2022-11-10 RX ADMIN — FUROSEMIDE 20 MG: 20 TABLET ORAL at 08:30

## 2022-11-10 RX ADMIN — MULTIVITAMIN TABLET 1 TABLET: TABLET at 09:01

## 2022-11-10 NOTE — DISCHARGE SUMMARY
Hospital Medicine Discharge Summary    Patient ID: Becky Bryant      Patient's PCP: No primary care provider on file. Admit Date: 11/2/2022     Discharge Date:  11/10/2022       Discharge Physician: Fernando Conn MD      Condition at discharge: Stable    Disposition: [address]    Discharge Diagnoses:  COVID-19 pneumonia  Acute hypoxic respiratory failure  Possible bacterial pneumonia  Possible adrenal adenoma     The patient was seen and examined on day of discharge and this discharge summary is in conjunction with any daily progress note from day of discharge. Hospital Course:   Ms. Becky Bryant, a 78y.o. year old female  who  has a past medical history of Fluid retention in legs. Patient presented to ED for evaluation of frequent falls for about a week, found to have COVID 19 requiring O2 supplementation. Patient also seen by pulmonary. She also was noted to have pneumonia for which she was placed on empiric antibiotics. Patient was started on dexamethasone and she is deemed not a candidate for remdesivir. Initially she was on 12 L oxygen and slowly titrated down to 2 L. There was concern for endometrial mass but pelvic ultrasound did not show any evidence of mass and recommended follow-up with gynecology as outpatient. There was also concern for cavitary lung lesion and I did discuss with pulmonary and imaging studies reviewed with our pulmonologist and no evidence of cavitary lesion per them and recommended outpatient follow-up. Patient has bed available at nursing home today. She overall is feeling better and she is agreeable with the plan. She was recommended to follow-up with OB/GYN as outpatient. She was recommended to follow-up with her primary care physician regarding evaluation of possible adrenal adenoma and she is agreeable. Patient's family is aware and they are in agreement with the treatment plan.     Consults:     IP CONSULT TO INTERNAL MEDICINE  IP CONSULT TO CASE MANAGEMENT  IP CONSULT TO PULMONOLOGY  IP CONSULT TO PHARMACY  IP CONSULT TO PHARMACY    Significant Diagnostic Studies:  As above      Discharge Instructions/Follow-up:  As above       Activity: activity as tolerated    Physical Exam:  Vitals:    11/10/22 0815   BP: 117/69   Pulse: 67   Resp: 17   Temp: 98.1 °F (36.7 °C)   SpO2:        General appearance: No apparent distress, appears stated age and cooperative. Respiratory:  Clear to auscultation, good air entry. Cardiovascular: RRR, no murmur. Abdomen: Soft, non-tender, non-distended with normal bowel sounds. Neurologic: awake, alert and following commands     Labs:  For convenience and continuity at follow-up the following most recent labs are provided:      CBC:    Lab Results   Component Value Date/Time    WBC 5.9 11/05/2022 05:48 AM    HGB 14.8 11/05/2022 05:48 AM    HCT 45.0 11/05/2022 05:48 AM     11/05/2022 05:48 AM       Renal:    Lab Results   Component Value Date/Time     11/06/2022 06:12 AM    K 4.2 11/06/2022 06:12 AM    K 4.2 11/03/2022 05:36 AM     11/06/2022 06:12 AM    CO2 25 11/06/2022 06:12 AM    BUN 24 11/06/2022 06:12 AM    CREATININE 0.6 11/06/2022 06:12 AM    CALCIUM 8.7 11/06/2022 06:12 AM         Discharge Medications:     Discharge Medication List as of 11/10/2022 10:37 AM             Details   acetaminophen (TYLENOL) 500 MG tablet Take 2 tablets by mouth every 6 hours as needed for Fever or Pain, Disp-120 tablet, R-3NO PRINT      albuterol sulfate HFA (PROVENTIL;VENTOLIN;PROAIR) 108 (90 Base) MCG/ACT inhaler Inhale 2 puffs into the lungs every 4 hours as needed for Wheezing or Shortness of Breath, Disp-18 g, R-3Normal      fluticasone-vilanterol (BREO ELLIPTA) 100-25 MCG/INH AEPB inhaler Inhale 2 puffs into the lungs daily, Disp-1 each, R-0Normal      lactobacillus (CULTURELLE) CAPS capsule Take 2 capsules by mouth 2 times daily, Disp-1 capsule, R-0NO PRINT      dexamethasone (DECADRON) 6 MG tablet Take 1 tablet by mouth daily for 5 doses, Disp-5 tablet, R-0NO PRINT      benzonatate (TESSALON) 100 MG capsule Take 1 capsule by mouth 3 times daily for 7 days, Disp-21 capsule, R-0NO PRINT      guaiFENesin (MUCINEX) 600 MG extended release tablet Take 2 tablets by mouth 2 times daily for 10 days, Disp-40 tablet, R-0Normal      furosemide (LASIX) 20 MG tablet Take 1 tablet by mouth daily, Disp-60 tablet, R-3NO PRINT      levoFLOXacin (LEVAQUIN) 750 MG tablet Take 1 tablet by mouth daily for 4 doses, Disp-4 tablet, R-0NO PRINT      Multiple Vitamin (MULTIVITAMIN) TABS tablet Take 1 tablet by mouth daily, Disp-1 tablet, R-0NO PRINT      vitamin D (CHOLECALCIFEROL) 50 MCG (2000 UT) TABS tablet Take 1 tablet by mouth daily, Disp-30 tablet, R-0NO PRINT      Ergocalciferol (VITAMIN D) 11654 units CAPS Take 50,000 Units by mouth once a week, Disp-12 capsule, R-0NO PRINT             Time Spent on discharge is more than 31 min in the examination, evaluation, counseling and review of medications and discharge plan. Signed:    Salinas Sheriff MD   11/10/2022      Thank you No primary care provider on file. for the opportunity to be involved in this patient's care. If you have any questions or concerns please feel free to contact me. NOTE: This report was transcribed using voice recognition software. Every effort was made to ensure accuracy; however, inadvertent computerized transcription errors may be present.

## 2022-11-10 NOTE — DISCHARGE INSTR - COC
Continuity of Care Form    Patient Name: Liz Garcia   :  1945  MRN:  [id number]    Admit date:  2022  Discharge date:  11/10/2022    Code Status Order: Full Code   Advance Directives:     Admitting Physician:  No admitting provider for patient encounter. PCP: No primary care provider on file. Discharging Nurse: [facility] Unit/Room#: 2810/1434-D  Discharging Unit Phone Number: [phone]    Emergency Contact:   Extended Emergency Contact Information  Primary Emergency Contact: Matt Espinosa Host)  Home Phone: [phone]  Relation: Grandchild  Preferred language: English   needed?  No  Secondary Emergency Contact: Jada Lew  Address: [address] [place] [address] Phone: [phone]  Relation: None    Past Surgical History:  Past Surgical History:   Procedure Laterality Date    DENTAL SURGERY      full mouth with dentures    TONSILLECTOMY      age 13     TUBAL LIGATION      45 years ago     TUBAL LIGATION         Immunization History:   Immunization History   Administered Date(s) Administered    Td (Adult), 2 Lf Tetanus Toxoid, Pf (Td, Absorbed) 2022       Active Problems:  Patient Active Problem List   Diagnosis Code    Acute respiratory failure ([facility] [address] J96.00    Pneumonia due to COVID-19 virus U07.1, J12.82    Tobacco abuse Z72.0    Vitamin D deficiency E55.9    [place] (hard of hearing) H91.90    Noncompliance Z91.199    Nocturnal oxygen desaturation G47.34       Isolation/Infection:   Isolation            Droplet Plus          Patient Infection Status       Infection Onset Added Last Indicated Last Indicated By Review Planned Expiration Resolved Resolved By    COVID-19 22 COVID-19, Rapid 22      Resolved    COVID-19 (Rule Out) 22 COVID-19, Rapid (Ordered)   22 Rule-Out Test Resulted            Nurse Assessment:  Last Vital Signs: /69   Pulse 67   Temp 98.1 °F (36.7 °C) (Oral)   Resp 17   Ht 5' 7\" (1.702 m)   Wt 181 lb (82.1 kg)   SpO2 96%   BMI 28.35 kg/m²     Last documented pain score (0-10 scale):    Last Weight:   Wt Readings from Last 1 Encounters:   11/10/22 181 lb (82.1 kg)     Mental Status:  oriented and alert    IV Access:  - None    Nursing Mobility/ADLs:  Walking   Assisted  Transfer  Assisted  Bathing  Independent  Dressing  Assisted  Toileting  Assisted  Feeding  Independent  Med Admin  Assisted  Med Delivery   whole    Wound Care Documentation and Therapy:        Elimination:  Continence: Bowel: Yes  Bladder: Yes  Urinary Catheter: None   Colostomy/Ileostomy/Ileal Conduit: No       Date of Last BM: 11/8/22    Intake/Output Summary (Last 24 hours) at 11/10/2022 0955  Last data filed at 11/10/2022 0620  Gross per 24 hour   Intake 490 ml   Output 680 ml   Net -190 ml     I/O last 3 completed shifts: In: 1636 [P.O.:1140; I.V.:30]  Out: 680 [Urine:680]    Safety Concerns: At Risk for Falls    Impairments/Disabilities:      Hearing    Nutrition Therapy:  Current Nutrition Therapy:   - Oral Diet:  General    Routes of Feeding: Oral  Liquids: Thin Liquids  Daily Fluid Restriction: no  Last Modified Barium Swallow with Video (Video Swallowing Test): not done    Treatments at the Time of Hospital Discharge:   Respiratory Treatments: ***  Oxygen Therapy:  is on oxygen at 2 L/min per nasal cannula.   Ventilator:    - No ventilator support    Rehab Therapies: Physical Therapy and Occupational Therapy  Weight Bearing Status/Restrictions: No weight bearing restrictions  Other Medical Equipment (for information only, NOT a DME order):  walker  Other Treatments: ***    Patient's personal belongings (please select all that are sent with patient):  {[facility] DME Belongings:[id number]}    RN SIGNATURE:  Electronically signed by Meseret Sandy RN on 11/10/22 at 10:26 AM EST    CASE MANAGEMENT/SOCIAL WORK SECTION    Inpatient Status Date: ***    Readmission Risk Assessment Score:  Readmission Risk              Risk of Unplanned Readmission:  13           Discharging to Facility/ Agency   Name:   Address:  Phone:  Fax:    Dialysis Facility (if applicable)   Name:  Address:  Dialysis Schedule:  Phone:  Fax:    / signature: {Esignature:[id number]}    PHYSICIAN SECTION    Prognosis: Fair    Condition at Discharge: Stable    Rehab Potential (if transferring to Rehab): Fair    Recommended Labs or Other Treatments After Discharge: ***    Physician Certification: I certify the above information and transfer of Shilpa Alonzo  is necessary for the continuing treatment of the diagnosis listed and that she requires {Admit to Appropriate Level of Care:57404} for {GREATER/LESS:969916941} 30 days.      Update Admission H&P: {[facility] DME [place] [facility][id number]}    PHYSICIAN SIGNATURE:  Electronically signed by Suellyn Rubinstein, MD on 11/10/22 at 9:55 AM EST

## 2022-11-10 NOTE — PLAN OF CARE
Problem: Discharge Planning  Goal: Discharge to home or other facility with appropriate resources  11/10/2022 1016 by Santhosh Junior RN  Outcome: Completed  11/10/2022 1015 by Santhosh Junior RN  Outcome: Adequate for Discharge     Problem: Cardiovascular - Adult  Goal: Maintains optimal cardiac output and hemodynamic stability  11/10/2022 1016 by Santhosh Junior RN  Outcome: Completed  11/10/2022 1015 by Santhosh Junior RN  Outcome: Adequate for Discharge     Problem: Metabolic/Fluid and Electrolytes - Adult  Goal: Hemodynamic stability and optimal renal function maintained  11/10/2022 1016 by Santhosh Junior RN  Outcome: Completed  11/10/2022 1015 by Santhosh Junior RN  Outcome: Adequate for Discharge     Problem: Skin/Tissue Integrity  Goal: Absence of new skin breakdown  Description: 1. Monitor for areas of redness and/or skin breakdown  2. Assess vascular access sites hourly  3. Every 4-6 hours minimum:  Change oxygen saturation probe site  4. Every 4-6 hours:  If on nasal continuous positive airway pressure, respiratory therapy assess nares and determine need for appliance change or resting period.   11/10/2022 1016 by Santhosh Junior RN  Outcome: Completed  11/10/2022 1015 by Santhosh Junior RN  Outcome: Adequate for Discharge     Problem: Safety - Adult  Goal: Free from fall injury  11/10/2022 1016 by Santhosh Junior RN  Outcome: Completed  11/10/2022 1015 by Snathosh Junior RN  Outcome: Adequate for Discharge     Problem: Nutrition Deficit:  Goal: Optimize nutritional status  11/10/2022 1016 by Santhosh Junior RN  Outcome: Completed  11/10/2022 1015 by Santhosh Junior RN  Outcome: Adequate for Discharge

## 2022-11-10 NOTE — CARE COORDINATION
Received a call from liaison yesterday and pre-cert was obtained. Nurse informed and checking for discharge. Ambulette transport arranged via [facility], ambulette transport set up for 10:30a-11:30a via [facility]. Ambulette form and 7000 completed, envelope placed on the soft chart. Family notified.     PAMELA Valencia, LSW [phone]

## 2022-11-10 NOTE — PLAN OF CARE
Problem: Discharge Planning  Goal: Discharge to home or other facility with appropriate resources  Outcome: Adequate for Discharge     Problem: Cardiovascular - Adult  Goal: Maintains optimal cardiac output and hemodynamic stability  Outcome: Adequate for Discharge     Problem: Metabolic/Fluid and Electrolytes - Adult  Goal: Hemodynamic stability and optimal renal function maintained  Outcome: Adequate for Discharge     Problem: Skin/Tissue Integrity  Goal: Absence of new skin breakdown  Description: 1. Monitor for areas of redness and/or skin breakdown  2. Assess vascular access sites hourly  3. Every 4-6 hours minimum:  Change oxygen saturation probe site  4. Every 4-6 hours:  If on nasal continuous positive airway pressure, respiratory therapy assess nares and determine need for appliance change or resting period.   Outcome: Adequate for Discharge     Problem: Safety - Adult  Goal: Free from fall injury  Outcome: Adequate for Discharge     Problem: Nutrition Deficit:  Goal: Optimize nutritional status  Outcome: Adequate for Discharge

## 2023-03-01 ENCOUNTER — APPOINTMENT (OUTPATIENT)
Dept: GENERAL RADIOLOGY | Age: 78
DRG: 481 | End: 2023-03-01
Payer: COMMERCIAL

## 2023-03-01 ENCOUNTER — HOSPITAL ENCOUNTER (INPATIENT)
Age: 78
LOS: 5 days | Discharge: NURSING, REHAB OR LONG-TERM CARE | DRG: 481 | End: 2023-03-06
Attending: EMERGENCY MEDICINE | Admitting: INTERNAL MEDICINE
Payer: COMMERCIAL

## 2023-03-01 ENCOUNTER — APPOINTMENT (OUTPATIENT)
Dept: CT IMAGING | Age: 78
DRG: 481 | End: 2023-03-01
Payer: COMMERCIAL

## 2023-03-01 DIAGNOSIS — S72.141A CLOSED DISPLACED INTERTROCHANTERIC FRACTURE OF RIGHT FEMUR, INITIAL ENCOUNTER (HCC): Primary | ICD-10-CM

## 2023-03-01 PROBLEM — Z87.81 HISTORY OF FEMUR FRACTURE: Status: ACTIVE | Noted: 2023-03-01

## 2023-03-01 LAB
ALBUMIN SERPL-MCNC: 3.9 G/DL (ref 3.5–5.2)
ALP BLD-CCNC: 78 U/L (ref 35–104)
ALT SERPL-CCNC: 11 U/L (ref 0–32)
ANION GAP SERPL CALCULATED.3IONS-SCNC: 10 MMOL/L (ref 7–16)
APTT: 29.4 SEC (ref 24.5–35.1)
AST SERPL-CCNC: 13 U/L (ref 0–31)
BASOPHILS ABSOLUTE: 0.06 E9/L (ref 0–0.2)
BASOPHILS RELATIVE PERCENT: 0.4 % (ref 0–2)
BILIRUB SERPL-MCNC: 0.3 MG/DL (ref 0–1.2)
BUN BLDV-MCNC: 13 MG/DL (ref 6–23)
CALCIUM SERPL-MCNC: 9.2 MG/DL (ref 8.6–10.2)
CHLORIDE BLD-SCNC: 105 MMOL/L (ref 98–107)
CO2: 26 MMOL/L (ref 22–29)
CREAT SERPL-MCNC: 0.6 MG/DL (ref 0.5–1)
EOSINOPHILS ABSOLUTE: 0.04 E9/L (ref 0.05–0.5)
EOSINOPHILS RELATIVE PERCENT: 0.3 % (ref 0–6)
GFR SERPL CREATININE-BSD FRML MDRD: >60 ML/MIN/1.73
GLUCOSE BLD-MCNC: 144 MG/DL (ref 74–99)
HCT VFR BLD CALC: 42.3 % (ref 34–48)
HEMOGLOBIN: 13.7 G/DL (ref 11.5–15.5)
IMMATURE GRANULOCYTES #: 0.1 E9/L
IMMATURE GRANULOCYTES %: 0.7 % (ref 0–5)
INR BLD: 1
LYMPHOCYTES ABSOLUTE: 1.06 E9/L (ref 1.5–4)
LYMPHOCYTES RELATIVE PERCENT: 7.1 % (ref 20–42)
MCH RBC QN AUTO: 28.6 PG (ref 26–35)
MCHC RBC AUTO-ENTMCNC: 32.4 % (ref 32–34.5)
MCV RBC AUTO: 88.3 FL (ref 80–99.9)
MONOCYTES ABSOLUTE: 0.55 E9/L (ref 0.1–0.95)
MONOCYTES RELATIVE PERCENT: 3.7 % (ref 2–12)
NEUTROPHILS ABSOLUTE: 13.1 E9/L (ref 1.8–7.3)
NEUTROPHILS RELATIVE PERCENT: 87.8 % (ref 43–80)
PDW BLD-RTO: 14.3 FL (ref 11.5–15)
PLATELET # BLD: 219 E9/L (ref 130–450)
PMV BLD AUTO: 9.4 FL (ref 7–12)
POTASSIUM SERPL-SCNC: 4.1 MMOL/L (ref 3.5–5)
PROTHROMBIN TIME: 11.2 SEC (ref 9.3–12.4)
RBC # BLD: 4.79 E12/L (ref 3.5–5.5)
SODIUM BLD-SCNC: 141 MMOL/L (ref 132–146)
TOTAL PROTEIN: 6.8 G/DL (ref 6.4–8.3)
WBC # BLD: 14.9 E9/L (ref 4.5–11.5)

## 2023-03-01 PROCEDURE — 85025 COMPLETE CBC W/AUTO DIFF WBC: CPT

## 2023-03-01 PROCEDURE — 73110 X-RAY EXAM OF WRIST: CPT

## 2023-03-01 PROCEDURE — 85730 THROMBOPLASTIN TIME PARTIAL: CPT

## 2023-03-01 PROCEDURE — 99285 EMERGENCY DEPT VISIT HI MDM: CPT

## 2023-03-01 PROCEDURE — 73552 X-RAY EXAM OF FEMUR 2/>: CPT

## 2023-03-01 PROCEDURE — 73130 X-RAY EXAM OF HAND: CPT

## 2023-03-01 PROCEDURE — 71045 X-RAY EXAM CHEST 1 VIEW: CPT

## 2023-03-01 PROCEDURE — 6360000002 HC RX W HCPCS: Performed by: STUDENT IN AN ORGANIZED HEALTH CARE EDUCATION/TRAINING PROGRAM

## 2023-03-01 PROCEDURE — 96374 THER/PROPH/DIAG INJ IV PUSH: CPT

## 2023-03-01 PROCEDURE — 93005 ELECTROCARDIOGRAM TRACING: CPT | Performed by: STUDENT IN AN ORGANIZED HEALTH CARE EDUCATION/TRAINING PROGRAM

## 2023-03-01 PROCEDURE — 2580000003 HC RX 258: Performed by: INTERNAL MEDICINE

## 2023-03-01 PROCEDURE — 73502 X-RAY EXAM HIP UNI 2-3 VIEWS: CPT

## 2023-03-01 PROCEDURE — 85610 PROTHROMBIN TIME: CPT

## 2023-03-01 PROCEDURE — 1200000000 HC SEMI PRIVATE

## 2023-03-01 PROCEDURE — 94640 AIRWAY INHALATION TREATMENT: CPT

## 2023-03-01 PROCEDURE — 6360000002 HC RX W HCPCS: Performed by: INTERNAL MEDICINE

## 2023-03-01 PROCEDURE — 2700000000 HC OXYGEN THERAPY PER DAY

## 2023-03-01 PROCEDURE — 6370000000 HC RX 637 (ALT 250 FOR IP): Performed by: FAMILY MEDICINE

## 2023-03-01 PROCEDURE — 72125 CT NECK SPINE W/O DYE: CPT

## 2023-03-01 PROCEDURE — 80053 COMPREHEN METABOLIC PANEL: CPT

## 2023-03-01 PROCEDURE — 94664 DEMO&/EVAL PT USE INHALER: CPT

## 2023-03-01 PROCEDURE — 70450 CT HEAD/BRAIN W/O DYE: CPT

## 2023-03-01 RX ORDER — MORPHINE SULFATE 4 MG/ML
4 INJECTION, SOLUTION INTRAMUSCULAR; INTRAVENOUS ONCE
Status: COMPLETED | OUTPATIENT
Start: 2023-03-01 | End: 2023-03-01

## 2023-03-01 RX ORDER — BUDESONIDE 0.25 MG/2ML
250 INHALANT ORAL 2 TIMES DAILY
Status: DISCONTINUED | OUTPATIENT
Start: 2023-03-01 | End: 2023-03-06 | Stop reason: HOSPADM

## 2023-03-01 RX ORDER — ACETAMINOPHEN 325 MG/1
650 TABLET ORAL EVERY 6 HOURS PRN
Status: DISCONTINUED | OUTPATIENT
Start: 2023-03-01 | End: 2023-03-06 | Stop reason: HOSPADM

## 2023-03-01 RX ORDER — OXYCODONE HYDROCHLORIDE 5 MG/1
5 TABLET ORAL EVERY 4 HOURS PRN
Status: DISCONTINUED | OUTPATIENT
Start: 2023-03-01 | End: 2023-03-06 | Stop reason: HOSPADM

## 2023-03-01 RX ORDER — HYDRALAZINE HYDROCHLORIDE 20 MG/ML
10 INJECTION INTRAMUSCULAR; INTRAVENOUS EVERY 4 HOURS PRN
Status: DISCONTINUED | OUTPATIENT
Start: 2023-03-01 | End: 2023-03-03

## 2023-03-01 RX ORDER — OXYCODONE HYDROCHLORIDE 5 MG/1
5 TABLET ORAL EVERY 4 HOURS PRN
Status: DISCONTINUED | OUTPATIENT
Start: 2023-03-01 | End: 2023-03-01

## 2023-03-01 RX ORDER — SODIUM CHLORIDE 9 MG/ML
INJECTION, SOLUTION INTRAVENOUS PRN
Status: DISCONTINUED | OUTPATIENT
Start: 2023-03-01 | End: 2023-03-06 | Stop reason: HOSPADM

## 2023-03-01 RX ORDER — SODIUM CHLORIDE 0.9 % (FLUSH) 0.9 %
10 SYRINGE (ML) INJECTION PRN
Status: DISCONTINUED | OUTPATIENT
Start: 2023-03-01 | End: 2023-03-06 | Stop reason: HOSPADM

## 2023-03-01 RX ORDER — MORPHINE SULFATE 2 MG/ML
2 INJECTION, SOLUTION INTRAMUSCULAR; INTRAVENOUS
Status: DISCONTINUED | OUTPATIENT
Start: 2023-03-01 | End: 2023-03-06 | Stop reason: HOSPADM

## 2023-03-01 RX ORDER — HYDRALAZINE HYDROCHLORIDE 20 MG/ML
20 INJECTION INTRAMUSCULAR; INTRAVENOUS EVERY 4 HOURS PRN
Status: DISCONTINUED | OUTPATIENT
Start: 2023-03-01 | End: 2023-03-03

## 2023-03-01 RX ORDER — FENTANYL CITRATE 50 UG/ML
25 INJECTION, SOLUTION INTRAMUSCULAR; INTRAVENOUS ONCE
Status: COMPLETED | OUTPATIENT
Start: 2023-03-01 | End: 2023-03-01

## 2023-03-01 RX ORDER — ONDANSETRON 2 MG/ML
4 INJECTION INTRAMUSCULAR; INTRAVENOUS EVERY 6 HOURS PRN
Status: DISCONTINUED | OUTPATIENT
Start: 2023-03-01 | End: 2023-03-06 | Stop reason: HOSPADM

## 2023-03-01 RX ORDER — ARFORMOTEROL TARTRATE 15 UG/2ML
15 SOLUTION RESPIRATORY (INHALATION) 2 TIMES DAILY
Status: DISCONTINUED | OUTPATIENT
Start: 2023-03-01 | End: 2023-03-06 | Stop reason: HOSPADM

## 2023-03-01 RX ORDER — ONDANSETRON 4 MG/1
4 TABLET, ORALLY DISINTEGRATING ORAL EVERY 8 HOURS PRN
Status: DISCONTINUED | OUTPATIENT
Start: 2023-03-01 | End: 2023-03-06 | Stop reason: HOSPADM

## 2023-03-01 RX ORDER — OXYCODONE HYDROCHLORIDE 10 MG/1
10 TABLET ORAL EVERY 4 HOURS PRN
Status: DISCONTINUED | OUTPATIENT
Start: 2023-03-01 | End: 2023-03-06 | Stop reason: HOSPADM

## 2023-03-01 RX ORDER — ALBUTEROL SULFATE 2.5 MG/3ML
2.5 SOLUTION RESPIRATORY (INHALATION) EVERY 4 HOURS PRN
Status: DISCONTINUED | OUTPATIENT
Start: 2023-03-01 | End: 2023-03-06 | Stop reason: HOSPADM

## 2023-03-01 RX ORDER — SODIUM CHLORIDE 0.9 % (FLUSH) 0.9 %
10 SYRINGE (ML) INJECTION EVERY 12 HOURS SCHEDULED
Status: DISCONTINUED | OUTPATIENT
Start: 2023-03-01 | End: 2023-03-06 | Stop reason: HOSPADM

## 2023-03-01 RX ORDER — ACETAMINOPHEN 325 MG/1
650 TABLET ORAL EVERY 6 HOURS SCHEDULED
Status: DISCONTINUED | OUTPATIENT
Start: 2023-03-01 | End: 2023-03-06 | Stop reason: HOSPADM

## 2023-03-01 RX ORDER — ACETAMINOPHEN 650 MG/1
650 SUPPOSITORY RECTAL EVERY 6 HOURS PRN
Status: DISCONTINUED | OUTPATIENT
Start: 2023-03-01 | End: 2023-03-06 | Stop reason: HOSPADM

## 2023-03-01 RX ADMIN — MORPHINE SULFATE 4 MG: 4 INJECTION, SOLUTION INTRAMUSCULAR; INTRAVENOUS at 13:19

## 2023-03-01 RX ADMIN — SODIUM CHLORIDE, PRESERVATIVE FREE 10 ML: 5 INJECTION INTRAVENOUS at 21:02

## 2023-03-01 RX ADMIN — OXYCODONE HYDROCHLORIDE 10 MG: 10 TABLET ORAL at 18:40

## 2023-03-01 RX ADMIN — ACETAMINOPHEN 650 MG: 325 TABLET ORAL at 18:40

## 2023-03-01 RX ADMIN — FENTANYL CITRATE 25 MCG: 50 INJECTION, SOLUTION INTRAMUSCULAR; INTRAVENOUS at 10:47

## 2023-03-01 RX ADMIN — ARFORMOTEROL TARTRATE 15 MCG: 15 SOLUTION RESPIRATORY (INHALATION) at 20:09

## 2023-03-01 RX ADMIN — BUDESONIDE 250 MCG: 0.25 SUSPENSION RESPIRATORY (INHALATION) at 20:10

## 2023-03-01 ASSESSMENT — PAIN SCALES - GENERAL
PAINLEVEL_OUTOF10: 8
PAINLEVEL_OUTOF10: 9
PAINLEVEL_OUTOF10: 0

## 2023-03-01 ASSESSMENT — PAIN DESCRIPTION - DESCRIPTORS
DESCRIPTORS: ACHING
DESCRIPTORS: ACHING
DESCRIPTORS: ACHING;CRAMPING;DISCOMFORT
DESCRIPTORS: ACHING;CRAMPING;DISCOMFORT

## 2023-03-01 ASSESSMENT — PAIN DESCRIPTION - ORIENTATION
ORIENTATION: RIGHT

## 2023-03-01 ASSESSMENT — PAIN DESCRIPTION - LOCATION
LOCATION: LEG;HIP
LOCATION: LEG
LOCATION: HIP
LOCATION: LEG

## 2023-03-01 ASSESSMENT — LIFESTYLE VARIABLES
HOW OFTEN DO YOU HAVE A DRINK CONTAINING ALCOHOL: NEVER
HOW MANY STANDARD DRINKS CONTAINING ALCOHOL DO YOU HAVE ON A TYPICAL DAY: PATIENT DOES NOT DRINK

## 2023-03-01 ASSESSMENT — PAIN - FUNCTIONAL ASSESSMENT
PAIN_FUNCTIONAL_ASSESSMENT: 0-10
PAIN_FUNCTIONAL_ASSESSMENT: PREVENTS OR INTERFERES WITH ALL ACTIVE AND SOME PASSIVE ACTIVITIES

## 2023-03-01 NOTE — ED PROVIDER NOTES
[facility]        Pt Name: Gualberto Mata  MRN: [id number]  [place] 1945  Date of evaluation: 3/1/2023  Provider: Odell Florian DO  PCP: No primary care provider on file. Note Started: 10:33 AM EST 3/1/23    CHIEF COMPLAINT       Chief Complaint   Patient presents with    Fall     Pt had a fall pain to right leg. EMS gave Dilaudid and zofran pta       HISTORY OF PRESENT ILLNESS: 1 or more Elements   History From: Patient    Limitations to history : None    Gualberto Mata is a 78 y.o. female with past medical history of chronic respiratory failure on chronic home oxygen at 1.5 L ATC and hard of hearing who presents to the emergency department status post mechanical fall. Patient states that she was walking in her kitchen when she tripped over a unfinished ledge. Patient states that she fell forward onto her right side. She tried to brace her fall with her hand and fell onto her right leg as well. Patient use life alert in order to call the ambulance to bring her into the ED for further evaluation. Patient denies any head trauma or loss of consciousness. She is not on any blood thinners. Patient endorses pain in her right thigh and hip region but denies any pain anywhere else. She states that she did bruise her hand and hurt her finger on the right side as well but does not have significant pain there. She denies any pain or injury anywhere else. Patient has no other symptoms including chest pain, shortness of breath, nausea, vomiting, dizziness, lightheadedness, fever, chills, cough, congestion, sore throat, urinary symptoms, abdominal pain, constipation or diarrhea. She denies any prodromal symptoms before fall including tunnel vision, dizziness, other vision changes or headache. Initial evaluation, patient is well-appearing and in no acute distress. Patient was given IV Dilaudid and Zofran in route to the emergency department.   She still states that she is in pain in her right thigh however. Right lower extremity is in the flexed position and slightly shortened. No other obvious injuries deformities to the head, neck, chest, trunk or extremities seen. Nursing Notes were all reviewed and agreed with or any disagreements were addressed in the HPI.    ROS:   Pertinent positives and negatives are stated within HPI, all other systems reviewed and are negative.    --------------------------------------------- PAST HISTORY ---------------------------------------------  Past Medical History:  has a past medical history of Fluid retention in legs. Past Surgical History:  has a past surgical history that includes Tubal ligation; Tonsillectomy; Dental surgery; and Tubal ligation. Social History:  reports that she has been smoking. She has a 17.50 pack-year smoking history. She has never used smokeless tobacco. She reports that she does not drink alcohol and does not use drugs. Family History: family history is not on file. The patients home medications have been reviewed. Allergies: Patient has no known allergies. ---------------------------------------------------PHYSICAL EXAM--------------------------------------    Constitutional/General: Alert and oriented x3, well appearing, non toxic in NAD  Head: Normocephalic and atraumatic  Mouth: Oropharynx clear, handling secretions, no trismus  Neck: Supple, full ROM,  Pulmonary: Lungs clear to auscultation bilaterally, no wheezes, rales, or rhonchi. Not in respiratory distress  Cardiovascular:  Regular rate. Regular rhythm. No murmurs  Chest: no chest wall tenderness  Abdomen: Soft. Non tender. Non distended. No rebound, guarding, or rigidity. No pulsatile masses appreciated. Musculoskeletal: Moves all extremities x 4. Warm and well perfused, no clubbing, cyanosis, or edema. Capillary refill <3 seconds. Bilateral lower extremities neurovascular intact with good DP/TP pulses palpated. No sensory deficits noted. Right lower extremity is in the flexed position with mild shortening as well. Tenderness to palpation over the right hip and thigh with no obvious step-off or deformities or crepitus on palpation. Compartments of the lower extremities are soft and compressible. Range of motion of the bilateral wrists and fingers are normal.  Skin: warm and dry. No rashes. Varicose veins present in the lower extremities including the feet. Ecchymoses over the right hand in the palmar region. Neurologic: GCS 15, no gross focal neurologic deficits  Psych: Normal Affect    -------------------------------------------------- RESULTS -------------------------------------------------  I have personally reviewed all laboratory and imaging results for this patient. Results are listed below.      LABS:  Results for orders placed or performed during the hospital encounter of 03/01/23   CBC with Auto Differential   Result Value Ref Range    WBC 14.9 (H) 4.5 - 11.5 E9/L    RBC 4.79 3.50 - 5.50 E12/L    Hemoglobin 13.7 11.5 - 15.5 g/dL    Hematocrit 42.3 34.0 - 48.0 %    MCV 88.3 80.0 - 99.9 fL    MCH 28.6 26.0 - 35.0 pg    MCHC 32.4 32.0 - 34.5 %    RDW 14.3 11.5 - 15.0 fL    Platelets 145 297 - 845 E9/L    MPV 9.4 7.0 - 12.0 fL    Neutrophils % 87.8 (H) 43.0 - 80.0 %    Immature Granulocytes % 0.7 0.0 - 5.0 %    Lymphocytes % 7.1 (L) 20.0 - 42.0 %    Monocytes % 3.7 2.0 - 12.0 %    Eosinophils % 0.3 0.0 - 6.0 %    Basophils % 0.4 0.0 - 2.0 %    Neutrophils Absolute 13.10 (H) 1.80 - 7.30 E9/L    Immature Granulocytes # 0.10 E9/L    Lymphocytes Absolute 1.06 (L) 1.50 - 4.00 E9/L    Monocytes Absolute 0.55 0.10 - 0.95 E9/L    Eosinophils Absolute 0.04 (L) 0.05 - 0.50 E9/L    Basophils Absolute 0.06 0.00 - 0.20 E9/L   CMP   Result Value Ref Range    Sodium 141 132 - 146 mmol/L    Potassium 4.1 3.5 - 5.0 mmol/L    Chloride 105 98 - 107 mmol/L    CO2 26 22 - 29 mmol/L    Anion Gap 10 7 - 16 mmol/L    Glucose 144 (H) 74 - 99 mg/dL    BUN 13 6 - 23 mg/dL    Creatinine 0.6 0.5 - 1.0 mg/dL    Est, Glom Filt Rate >60 >=60 mL/min/1.73    Calcium 9.2 8.6 - 10.2 mg/dL    Total Protein 6.8 6.4 - 8.3 g/dL    Albumin 3.9 3.5 - 5.2 g/dL    Total Bilirubin 0.3 0.0 - 1.2 mg/dL    Alkaline Phosphatase 78 35 - 104 U/L    ALT 11 0 - 32 U/L    AST 13 0 - 31 U/L   Protime-INR   Result Value Ref Range    Protime 11.2 9.3 - 12.4 sec    INR 1.0    APTT   Result Value Ref Range    aPTT 29.4 24.5 - 35.1 sec   CBC auto differential   Result Value Ref Range    WBC 11.3 4.5 - 11.5 E9/L    RBC 4.28 3.50 - 5.50 E12/L    Hemoglobin 12.4 11.5 - 15.5 g/dL    Hematocrit 39.1 34.0 - 48.0 %    MCV 91.4 80.0 - 99.9 fL    MCH 29.0 26.0 - 35.0 pg    MCHC 31.7 (L) 32.0 - 34.5 %    RDW 14.4 11.5 - 15.0 fL    Platelets 934 214 - 105 E9/L    MPV 10.2 7.0 - 12.0 fL    Neutrophils % 64.2 43.0 - 80.0 %    Immature Granulocytes % 0.4 0.0 - 5.0 %    Lymphocytes % 23.9 20.0 - 42.0 %    Monocytes % 7.9 2.0 - 12.0 %    Eosinophils % 3.0 0.0 - 6.0 %    Basophils % 0.6 0.0 - 2.0 %    Neutrophils Absolute 7.28 1.80 - 7.30 E9/L    Immature Granulocytes # 0.04 E9/L    Lymphocytes Absolute 2.70 1.50 - 4.00 E9/L    Monocytes Absolute 0.89 0.10 - 0.95 E9/L    Eosinophils Absolute 0.34 0.05 - 0.50 E9/L    Basophils Absolute 0.07 0.00 - 0.20 E9/L   EKG 12 Lead   Result Value Ref Range    Ventricular Rate 66 BPM    Atrial Rate 66 BPM    P-R Interval 168 ms    QRS Duration 88 ms    Q-T Interval 434 ms    QTc Calculation (Bazett) 454 ms    P Axis 88 degrees    R Axis 45 degrees    T Axis 64 degrees       RADIOLOGY:   Interpretation per the Radiologist below, if available at the time of this note:    CT HEAD WO CONTRAST   Final Result   1. There is no acute intracranial abnormality. Specifically, there is no   intracranial hemorrhage. 2. Atrophy and periventricular leukomalacia,   3. 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    .         CT CERVICAL SPINE WO CONTRAST Final Result   1. There is no acute compression fracture or subluxation of the cervical   spine. 2. Multilevel degenerative disc and degenerative joint disease. XR HIP 2-3 VW W PELVIS RIGHT   Final Result   Intertrochanteric fracture. XR HAND RIGHT (MIN 3 VIEWS)   Final Result   No acute osseous abnormality. XR CHEST PORTABLE   Final Result   No acute cardiopulmonary process. Question minimal atelectasis left lung base. XR FEMUR RIGHT (MIN 2 VIEWS)   Final Result   Comminuted intertrochanteric fracture. XR WRIST RIGHT (MIN 3 VIEWS)   Final Result   Normal wrist radiographs           No results found. No results found. See preliminary interpretation by me below. EKG: This EKG is signed and interpreted by me. Normal sinus rhythm with ventricular rate of 66 bpm.  MD interval normal.  QTc not prolonged. No evidence of acute ST elevation MI. Low voltage QRS. No significant changes compared to previous EKG on 11/2/2022.    ------------------------- NURSING NOTES AND VITALS REVIEWED ---------------------------   The nursing notes within the ED encounter and vital signs as below have been reviewed by myself. /89   Pulse 89   Temp 97.7 °F (36.5 °C) (Temporal)   Resp 16   Ht 5' 7\" (1.702 m)   Wt 198 lb 3.1 oz (89.9 kg)   SpO2 94%   BMI 31.04 kg/m²   Oxygen Saturation Interpretation: Normal    The patients available past medical records and past encounters were reviewed.         ------------------------------ ED COURSE/MEDICAL DECISION MAKING----------------------  Medications   albuterol (PROVENTIL) nebulizer solution 2.5 mg (has no administration in time range)   sodium chloride flush 0.9 % injection 10 mL (10 mLs IntraVENous Given 3/1/23 2102)   sodium chloride flush 0.9 % injection 10 mL (has no administration in time range)   0.9 % sodium chloride infusion (has no administration in time range)   ondansetron (ZOFRAN-ODT) disintegrating tablet 4 mg (has no administration in time range)     Or   ondansetron (ZOFRAN) injection 4 mg (has no administration in time range)   magnesium hydroxide (MILK OF MAGNESIA) 400 MG/5ML suspension 30 mL (has no administration in time range)   acetaminophen (TYLENOL) tablet 650 mg (has no administration in time range)     Or   acetaminophen (TYLENOL) suppository 650 mg (has no administration in time range)   hydrALAZINE (APRESOLINE) injection 10 mg (has no administration in time range)   hydrALAZINE (APRESOLINE) injection 20 mg (has no administration in time range)   morphine (PF) injection 2 mg (has no administration in time range)   oxyCODONE (ROXICODONE) immediate release tablet 5 mg ( Oral See Alternative 3/2/23 0031)     Or   oxyCODONE HCl (OXY-IR) immediate release tablet 10 mg (10 mg Oral Given 3/2/23 0031)   acetaminophen (TYLENOL) tablet 650 mg (650 mg Oral Given 3/2/23 0557)   budesonide (PULMICORT) nebulizer suspension 250 mcg (250 mcg Nebulization Given 3/1/23 2010)   arformoterol tartrate (BROVANA) nebulizer solution 15 mcg (15 mcg Nebulization Given 3/1/23 2009)   fentaNYL (SUBLIMAZE) injection 25 mcg (25 mcg IntraVENous Given 3/1/23 1047)   morphine sulfate (PF) injection 4 mg (4 mg IntraVENous Given 3/1/23 1319)       Medical Decision Making/Differential Diagnosis:    CC/HPI Summary, Pertinent Physical Exam Findings, Social Determinants of health, Records Reviewed, DDx, testing done/not done, ED Course, Reassessment, disposition considerations/shared decision making with patient, consults, disposition:        Medical Decision Making:   I, Dr. Dallas Pandey am the resident physician of record. History From: Patient    Limitations to history : None     Venice Maloney is a 78 y.o. female who presents to the ED for mechanical fall with right hip and leg pain.   Vital signs upon arrival /89   Pulse 89   Temp 97.7 °F (36.5 °C) (Temporal)   Resp 16   Ht 5' 7\" (1.702 m)   Wt 198 lb 3.1 oz (89.9 kg) SpO2 94%   BMI 31.04 kg/m²     On initial evaluation, patient is nontoxic-appearing, afebrile, hemodynamically stable in no acute distress. Differential diagnosis includes intracranial hemorrhage, concussion, cervical spine fracture, acute hip fracture, tendon injury or joint dislocation. Physical exam remarkable for shortened right lower extremity with tenderness to palpation over the right hip and thigh region. No obvious step-offs or deformities noted however. Bilateral lower extremities neurovascular intact with good DP/TP pulses. No sensory deficits. Compartments of the lower extremities are soft and compressible. No other obvious injuries or deformities to the head, neck, chest, trunk or extremities noted. Lungs are clear to auscultation bilaterally. Abdomen soft, nontender nondistended. No midline bony tenderness to cervical, thoracic or lumbar spine. No other concerning physical exam findings. Work-up in the emergency department significant for comminuted and intratrochanteric fracture of the right femur. Remaining imaging studies negative for other traumatic injury. Imaging as interpreted by me detailed below with official radiology read above. Lab work-up generally unremarkable. Work-up as interpreted by me include CBC with mild leukocytosis. WBC 14.9. Hemoglobin stable at 13.7. Platelet count normal at 219. CMP/BUN 13. No major LFT abnormalities including normal potassium of 4.1 and sodium of 141. No elevations in LFTs. Coags unremarkable. PT/INR 11.2/1. aPTT 29.4. Patient was given IV fentanyl 25 mcg and IV morphine 4 mg in the emergency department for pain control. Plan is for admission for further work-up and evaluation. Orthopedic surgery consulted, please see independent note for more detailed recommendations and management. Patient admitted to medicine service under Dr. Waldo Francisco. Patient was agreeable with plan for admission after shared decision making.   She remained hemodynamically stable in the ED. Non-plain film images such as CT, Ultrasound and MRI are read by the radiologist. [facility] radiographic images are visualized and preliminarily interpreted by the ED Provider with the below findings:    CT head with no obvious intracranial hemorrhage, large masses, midline shift or herniation. CT cervical spine with no obvious acute fractures or dislocations. No notable masses or significant canal stenosis seen. Chest x-ray with no obvious focal consolidation suggestive of pneumonia, large pleural effusions or pneumothorax. Normal cardiac silhouette. Right hand and wrist x-ray unremarkable with no evidence of acute fractures or dislocations. Right pelvis and right femur x-ray notable for intertrochanteric femur fracture. Discussion with Other Profesionals : Admitting Team who accepted the patient for admission and Consultant orthopedic surgery to help in the management of intertrochanteric femur fracture, please see independent note for recommendations and plan    Social Determinants : None    Records Reviewed : Inpatient Notes Discharge summary from most recent visit on 11/2/2022 reviewed. Patient was admitted for 8 days due to acute hypoxic respiratory failure in the setting of COVID-19. Patient was placed on empiric antibiotics and was not a candidate for remdesivir. Patient initially on 12 L of oxygen and slowly titrated down to 2 L. Patient was discharged to rehab facility with oxygen requirements. Chronic conditions: chronic respiratory failure on chronic home oxygen at 1.5 L ATC and hard of hearing     CONSULTS: Internal medicine and orthopedic surgery. Disposition:   Admission    Consultation with Dr. Ronni Johnson who accepted the patient for admission. The patient will be admitted for further treatment and evaluation for   1.  Closed displaced intertrochanteric fracture of right femur, initial encounter ([facility] [address] Re-Evaluations/Consultations:             ED Course as of 03/02/23 0604   Wed Mar 01, 2023   1242 Spoke with patient's granddaughter over the phone. She is updated on patient's clinical status and plan for admission. She was agreeable with plan. She states that the patient's PCP is Dr. Hua Marques. [PP]   [phone] Patient accepted for admission by Dr. Carlos Cherry. [PP]      ED Course User Index  [PP] Ajay Bernabe DO         This patient's ED course included: History, physical examination, reevaluation prior to disposition    This patient has remained hemodynamically stable during their ED course. Counseling: The emergency provider has spoken with the patient and discussed todays results, in addition to providing specific details for the plan of care and counseling regarding the diagnosis and prognosis. Questions are answered at this time and they are agreeable with the plan.       --------------------------------- IMPRESSION AND DISPOSITION ---------------------------------    IMPRESSION  1. Closed displaced intertrochanteric fracture of right femur, initial encounter ([facility] [address]        DISPOSITION  Disposition: Admit to telemetry  Patient condition is stable        NOTE: This report was transcribed using voice recognition software.  Every effort was made to ensure accuracy; however, inadvertent computerized transcription errors may be present           Ajay Bernabe DO  Resident  03/02/23 6649

## 2023-03-01 NOTE — LETTER
[place] Department Medicaid  CERTIFICATION OF NECESSITY  FOR NON-EMERGENCY TRANSPORTATION   BY GROUND AMBULANCE      Individual Information   1. Name: Sri Arnett 2. [place] Medicaid Billing Number:    3. Address: [address] [place] [address]      Transportation Provider Information   4. Provider Name:    5. [place] Medicaid Provider Number: National Provider Identifier (NPI):      Certification  7. Criteria:  During transport, this individual requires:  [x] Medical treatment or continuous     supervision by an EMT. [x] The administration or regulation of oxygen by another person. [] Supervised protective restraint. 8. Period Beginning Date:        5. Length  [x] Not more than 1 day(s)  [] One Year     Additional Information Relevant to Certification   10. Comments or Explanations, If Necessary or Appropriate     S/p RIGHT HIP CEPHALOMEDULLARY NAIL FOR RIGHT INTERTROCHANTERIC HIP FRACTURE; pain with light movement. Certifying Practitioner Information   11. Name of Practitioner: Matteo Ragsdale MD   12. [place] Medicaid Provider Number, If Applicable:  [address] Provider Identifier (NPI):      Signature Information   14. Date of Signature:  13. Name of Person Signin. Signature and Professional Designation:      [facility] [id number]  Rev. 2015    [address]  [address] Encounter Date/Time: 3/1/2023 [address] Account: [de-identified]    MRN: [id number]    Patient: Sri Arnett    Contact Serial #: [id number]      ENCOUNTER          Patient Class: I Private Enc? No Unit RM BD: [place]  Service:  INM   Encounter DX: Intertrochanteric fractu*   ADM Provider: Perfecto Camacho MD   Procedure:     ATT Provider: Matteo Ragsdale MD   REF Provider:        Admission DX: Intertrochanteric fracture of right femur, closed, initial encounter [facility]), History of femur fracture and DX codes: S72.141A, Z87.81      PATIENT                 Name: Sri Arnett : 1945 (77 yrs)   Address: [address] Sex: Female   [place] city: [facility] [address]         Marital Status:    Employer: RETIRED         Voodoo: Protestant   Primary Care Provider:           Primary Phone: [phone]   EMERGENCY CONTACT   Contact Name Legal Guardian? Relationship to Patient Home Phone Work Phone   1. John Tavarez  2. Jada Lew No    Grandchild    [phone]              GUARANTOR            Guarantor: Marcustanja Brunoa     : 1945   Address: [address] Sex: Female     [address] [address]     Relation to Patient: Self       Home Phone: [phone]   Guarantor ID: [id number]       Work Phone:     Guarantor Employer: RETIRED         Status: RETIRED      COVERAGE        PRIMARY INSURANCE   Payor: [place]* Plan: Marisol MENDOZA   Payor Address: [facility], [address] [facility]       Group Number: [id number] Type: INDEMNITY   Subscriber Name: Heathre Sadler : 1945   Subscriber ID: [id number] Pat. Rel. to Sub: Self   SECONDARY INSURANCE   Payor:   Plan:     Payor Address:  ,           Group Number:   Insurance Type:     Subscriber Name:   Subscriber :     Subscriber ID:   Pat.  Rel. to Sub:        CSN: [id number]

## 2023-03-01 NOTE — H&P
Hospitalist History & Physical      PATIENT NAME:  Luis Coleman    MRN:  [id number]  SERVICE DATE:  03/01/23    Primary Care Physician: No primary care provider on file. SUBJECTIVE  CHIEF COMPLAINT:  had concerns including Fall (Pt had a fall pain to right leg. EMS gave Dilaudid and zofran pta). HPI:  Ms. Luis Coleman, a 78y.o. year old female  who  has a past medical history of Fluid retention in legs. presents with mechanical fall, tripped over a ledge in the kitchen, fell on rt side, femur neck fracture. No headache no vision changes no fever or chills, no chest pain or SOB fever or chills nausea or vomiting. PAST MEDICAL HISTORY:    Past Medical History:   Diagnosis Date    Fluid retention in legs      PAST SURGICAL HISTORY:    Past Surgical History:   Procedure Laterality Date    DENTAL SURGERY      full mouth with dentures    TONSILLECTOMY      age 13     TUBAL LIGATION      37 years ago     TUBAL LIGATION       FAMILY HISTORY:  No family history on file.   SOCIAL HISTORY:    Social History     Socioeconomic History    Marital status:      Spouse name: Not on file    Number of children: Not on file    Years of education: Not on file    Highest education level: Not on file   Occupational History    Not on file   Tobacco Use    Smoking status: Every Day     Packs/day: 0.50     Years: 35.00     Pack years: 17.50     Types: Cigarettes    Smokeless tobacco: Never   Substance and Sexual Activity    Alcohol use: No    Drug use: No    Sexual activity: Not on file   Other Topics Concern    Not on file   Social History Narrative    Not on file     Social Determinants of Health     Financial Resource Strain: Not on file   Food Insecurity: Not on file   Transportation Needs: Not on file   Physical Activity: Not on file   Stress: Not on file   Social Connections: Not on file   Intimate Partner Violence: Not on file   Housing Stability: Not on file    TOBACCO:   reports that she has been smoking. She has a 17.50 pack-year smoking history. She has never used smokeless tobacco.  ETOH:   reports no history of alcohol use. MEDICATIONS:   Prior to Admission medications    Medication Sig Start Date End Date Taking? Authorizing Provider   acetaminophen (TYLENOL) 500 MG tablet Take 2 tablets by mouth every 6 hours as needed for Fever or Pain 11/7/22   Kaleb Mccartney MD   albuterol sulfate HFA (PROVENTIL;VENTOLIN;PROAIR) 108 (90 Base) MCG/ACT inhaler Inhale 2 puffs into the lungs every 4 hours as needed for Wheezing or Shortness of Breath 11/7/22   Kaleb Mccartney MD   fluticasone-vilanterol [facility] ELLIPTA) 100-25 MCG/INH AEPB inhaler Inhale 2 puffs into the lungs daily 11/7/22   Kaleb Mccartney MD   lactobacillus (CULTURELLE) CAPS capsule Take 2 capsules by mouth 2 times daily 11/7/22   Kaleb Mccartney MD   furosemide (LASIX) 20 MG tablet Take 1 tablet by mouth daily 11/7/22   Kaleb Mccartney MD   Multiple Vitamin (MULTIVITAMIN) TABS tablet Take 1 tablet by mouth daily 11/7/22   Kaleb Mccartney MD   vitamin D (CHOLECALCIFEROL) 50 MCG (2000 UT) TABS tablet Take 1 tablet by mouth daily 11/7/22   Kaleb Mccartney MD   Ergocalciferol (VITAMIN D) 98222 units CAPS Take 50,000 Units by mouth once a week 11/10/22   Kaleb Mccartney MD        ALLERGIES: Patient has no known allergies. REVIEW OF SYSTEM:   ROS as noted in HPI, 12 point ROS reviewed and otherwise negative. OBJECTIVE  PHYSICAL EXAM:   Vitals:    03/01/23 1018   BP: (!) 197/91   Pulse: 77   Resp: 18   Temp: 97.4 °F (36.3 °C)   SpO2: 94%       General appearance: alert, appears stated age and cooperative  CONSTITUTIONAL: in pain distress and asking for ice chips.    ENT:  normocephalic, without obvious abnormality, atraumatic  NECK:  supple, symmetrical, trachea midline  Heart: regular rate and rhythm, S1, S2 normal   Lungs: clear to auscultation bilaterally  Abdomen: soft lax, obese, not tender, not distended, positive bowel sounds  Extremities: rt leg externally rotated shortened, no movement from pain. Warm and normal capillary refill. Varicose venins present. Skin: Normal skin color. No rashes or lesions. Neurologic:  Neurovascularly intact without any focal sensory/motor deficits. Cranial nerves: II-XII intact, grossly non-focal.  Psychiatric: Alert and oriented, thought content appropriate, normal insight, flat affect      DATA:     Diagnostic tests reviewed for today's visit:    Most recent labs and imaging results reviewed.    Labs:   Recent Results (from the past 72 hour(s))   CBC with Auto Differential    Collection Time: 03/01/23 12:18 PM   Result Value Ref Range    WBC 14.9 (H) 4.5 - 11.5 E9/L    RBC 4.79 3.50 - 5.50 E12/L    Hemoglobin 13.7 11.5 - 15.5 g/dL    Hematocrit 42.3 34.0 - 48.0 %    MCV 88.3 80.0 - 99.9 fL    MCH 28.6 26.0 - 35.0 pg    MCHC 32.4 32.0 - 34.5 %    RDW 14.3 11.5 - 15.0 fL    Platelets 953 378 - 047 E9/L    MPV 9.4 7.0 - 12.0 fL    Neutrophils % 87.8 (H) 43.0 - 80.0 %    Immature Granulocytes % 0.7 0.0 - 5.0 %    Lymphocytes % 7.1 (L) 20.0 - 42.0 %    Monocytes % 3.7 2.0 - 12.0 %    Eosinophils % 0.3 0.0 - 6.0 %    Basophils % 0.4 0.0 - 2.0 %    Neutrophils Absolute 13.10 (H) 1.80 - 7.30 E9/L    Immature Granulocytes # 0.10 E9/L    Lymphocytes Absolute 1.06 (L) 1.50 - 4.00 E9/L    Monocytes Absolute 0.55 0.10 - 0.95 E9/L    Eosinophils Absolute 0.04 (L) 0.05 - 0.50 E9/L    Basophils Absolute 0.06 0.00 - 0.20 E9/L     Oupatient labs:  Lab Results   Component Value Date    CHOL 230 (H) 09/13/2017    TRIG 273 (H) 09/13/2017    HDL 37 09/13/2017    LDLCALC 138 (H) 09/13/2017    TSH 0.361 11/04/2022    LABA1C 6.7 (H) 11/04/2022       Urinalysis:    Lab Results   Component Value Date/Time    NITRU Negative 11/02/2022 10:12 AM    WBCUA 2-5 11/02/2022 10:12 AM    BACTERIA FEW 11/02/2022 10:12 AM    RBCUA 2-5 11/02/2022 10:12 AM    BLOODU SMALL 11/02/2022 10:12 AM    SPECGRAV >=1.030 11/02/2022 10:12 AM GLUCOSEU Negative 11/02/2022 10:12 AM       Imaging:  XR WRIST RIGHT (MIN 3 VIEWS)    Result Date: 3/1/2023  EXAMINATION: XRAY VIEWS OF THE RIGHT WRIST 3/1/2023 11:22 am COMPARISON: None. HISTORY: ORDERING SYSTEM PROVIDED HISTORY: fall TECHNOLOGIST PROVIDED HISTORY: Reason for exam:->fall What reading provider will be dictating this exam?->CRC FINDINGS: Carpal bones and alignment are maintained. Distal radius and ulna are intact. No acute fracture or dislocation. Normal wrist radiographs     XR HAND RIGHT (MIN 3 VIEWS)    Result Date: 3/1/2023  EXAMINATION: THREE XRAY VIEWS OF THE RIGHT HAND 3/1/2023 11:22 am COMPARISON: None. HISTORY: ORDERING SYSTEM PROVIDED HISTORY: fall TECHNOLOGIST PROVIDED HISTORY: Reason for exam:->fall What reading provider will be dictating this exam?->CRC FINDINGS: There is no evidence of acute fracture. There is normal alignment. No acute joint abnormality. No focal osseous lesion. No focal soft tissue abnormality. No acute osseous abnormality. XR FEMUR RIGHT (MIN 2 VIEWS)    Result Date: 3/1/2023  EXAMINATION: XRAY VIEWS OF THE RIGHT FEMUR 3/1/2023 11:22 am COMPARISON: None. HISTORY: ORDERING SYSTEM PROVIDED HISTORY: fall TECHNOLOGIST PROVIDED HISTORY: Reason for exam:->fall What reading provider will be dictating this exam?->CRC FINDINGS: There is comminuted intertrochanteric fracture and varus angulation. Degenerative changes in the visualized knee. Soft tissue swelling adjacent to the fracture site noted. Comminuted intertrochanteric fracture. CT HEAD WO CONTRAST    Result Date: 3/1/2023  EXAMINATION: CT OF THE HEAD WITHOUT CONTRAST  3/1/2023 11:32 am TECHNIQUE: CT of the head was performed without the administration of intravenous contrast. Automated exposure control, iterative reconstruction, and/or weight based adjustment of the mA/kV was utilized to reduce the radiation dose to as low as reasonably achievable. COMPARISON: None.  HISTORY: ORDERING SYSTEM PROVIDED HISTORY: fall TECHNOLOGIST PROVIDED HISTORY: Reason for exam:->fall Has a \"code stroke\" or \"stroke alert\" been called? ->No Decision Support Exception - unselect if not a suspected or confirmed emergency medical condition->Emergency Medical Condition (MA) What reading provider will be dictating this exam?->CRC FINDINGS: BRAIN/VENTRICLES: There is no acute intracranial hemorrhage, mass effect or midline shift. No abnormal extra-axial fluid collection. The gray-white differentiation is maintained without evidence of an acute infarct. There is no evidence of hydrocephalus. The ventricles, cisterns and sulci are prominent consistent with atrophy. There is decreased attenuation within the periventricular white matter consistent with periventricular leukomalacia. ORBITS: The visualized portion of the orbits demonstrate no acute abnormality. SINUSES: The visualized paranasal sinuses and mastoid air cells demonstrate no acute abnormality. SOFT TISSUES/SKULL:  No acute abnormality of the visualized skull or soft tissues. 1.  There is no acute intracranial abnormality. Specifically, there is no intracranial hemorrhage. 2. Atrophy and periventricular leukomalacia, 3. 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 .     CT CERVICAL SPINE WO CONTRAST    Result Date: 3/1/2023  EXAMINATION: CT OF THE CERVICAL SPINE WITHOUT CONTRAST 3/1/2023 11:32 am TECHNIQUE: CT of the cervical spine was performed without the administration of intravenous contrast. Multiplanar reformatted images are provided for review. Automated exposure control, iterative reconstruction, and/or weight based adjustment of the mA/kV was utilized to reduce the radiation dose to as low as reasonably achievable. COMPARISON: None.  HISTORY: ORDERING SYSTEM PROVIDED HISTORY: fall TECHNOLOGIST PROVIDED HISTORY: Reason for exam:->fall Decision Support Exception - unselect if not a suspected or confirmed emergency medical condition->Emergency Medical Condition (MA) What reading provider will be dictating this exam?->CRC FINDINGS: The ring of C1 is intact as is the dense. There is no compression fracture of the cervical spine. No jumped or perched facet is noted. Multilevel degenerative disc and degenerative joint disease is noted. The prevertebral soft tissues are unremarkable. The airway is widely patent. Images through the lung apices are negative for a pneumothorax. 1. There is no acute compression fracture or subluxation of the cervical spine. 2. Multilevel degenerative disc and degenerative joint disease. XR CHEST PORTABLE    Result Date: 3/1/2023  EXAMINATION: ONE XRAY VIEW OF THE CHEST 3/1/2023 11:22 am COMPARISON: None. HISTORY: ORDERING SYSTEM PROVIDED HISTORY: fall TECHNOLOGIST PROVIDED HISTORY: Reason for exam:->fall What reading provider will be dictating this exam?->CRC FINDINGS: Mild atelectasis left lung base. The heart is mildly enlarged. There is no pneumothorax. No large pleural effusion. No acute cardiopulmonary process. Question minimal atelectasis left lung base. XR HIP 2-3 VW W PELVIS RIGHT    Result Date: 3/1/2023  EXAMINATION: ONE XRAY VIEW OF THE PELVIS AND TWO XRAY VIEWS RIGHT HIP 3/1/2023 11:22 am HISTORY: ORDERING SYSTEM PROVIDED HISTORY: fall TECHNOLOGIST PROVIDED HISTORY: Reason for exam:->fall What reading provider will be dictating this exam?->CRC FINDINGS: There is mildly angulated right intertrochanteric fracture and adjacent soft tissue swelling. Intertrochanteric fracture. CT HEAD WO CONTRAST   Final Result   1. There is no acute intracranial abnormality. Specifically, there is no   intracranial hemorrhage. 2. Atrophy and periventricular leukomalacia,   3. 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 .         CT CERVICAL SPINE WO CONTRAST   Final Result   1. There is no acute compression fracture or subluxation of the cervical   spine.    2. Multilevel degenerative disc and degenerative joint disease. XR HIP 2-3 VW W PELVIS RIGHT   Final Result   Intertrochanteric fracture. XR HAND RIGHT (MIN 3 VIEWS)   Final Result   No acute osseous abnormality. XR CHEST PORTABLE   Final Result   No acute cardiopulmonary process. Question minimal atelectasis left lung base. XR FEMUR RIGHT (MIN 2 VIEWS)   Final Result   Comminuted intertrochanteric fracture. XR WRIST RIGHT (MIN 3 VIEWS)   Final Result   Normal wrist radiographs               ASSESSMENT AND PLAN  Active Problems:    * No active hospital problems. *  Resolved Problems:    * No resolved hospital problems. *    - Rt femur fracture  - leg pain  - elevated BP  - Leukocytosis     Plan:  - admit to med surg, tele monitoring   - Ortho consulted   - pain control  - pt/ot and weight bearing recommendations  - DVT prophylaxis per Ortho  - pain control, ice packs, elevation   - encourage IS while awake   - elevated blood pressure, add hydralazine prn and monitor.   - fall precautions  - off antibiotics, monitor       VTE Prophylaxis: scd, per Ortho  DVT Prophylaxis: []Lovenox []Heparin []PCD [] [address] []Encouraged ambulation    Diet: No diet orders on file  Code Status: Prior  Surrogate decision maker confirmed with patient:  Primary Emergency Contact: John Tavarez, Home Phone: [phone]      Disposition: [x]Med/Surg [] Intermediate [] ICU/CCU    Admit status: [] Observation [x] Inpatient        Additional work up or/and treatment plan may be added today or thereafter based on clinical progression. I am managing a portion of pt care. Some medical issues are handled by other specialists. Additional work up and treatment should be done by my colleague hospitalist and at out pt setting by pt PCP and other out pt providers.      Brady Arce MD  DATE:  March 1, 2023

## 2023-03-01 NOTE — CONSULTS
Department of Orthopedic Surgery  Resident Consult Note          Reason for Consult: Fall, right hip pain    HISTORY OF PRESENT ILLNESS:       Patient is a 78 y.o. female who presents with right hip pain after a fall from standing. Patient is a community ambulator, uses a walker or cane, states that she tripped today and fell onto her right side. She had immediate right hip pain and inability to ambulate. Denies numbness/tingling/paresthesias. Denies any other orthopedic complaints at this time. States she does not have any significant medical history, although she does wear oxygen at night and uses multiple inhalers. Past Medical History:        Diagnosis Date    Fluid retention in legs      Past Surgical History:        Procedure Laterality Date    DENTAL SURGERY      full mouth with dentures    TONSILLECTOMY      age 13     TUBAL LIGATION      37 years ago     TUBAL LIGATION       Current Medications:   No current facility-administered medications for this encounter. Allergies:  Patient has no known allergies. Social History:   TOBACCO:   reports that she has been smoking. She has a 17.50 pack-year smoking history. She has never used smokeless tobacco.  ETOH:   reports no history of alcohol use. DRUGS:   reports no history of drug use. ACTIVITIES OF DAILY LIVING:    OCCUPATION:    Family History:   No family history on file.     REVIEW OF SYSTEMS:  CONSTITUTIONAL:  negative for  fevers, chills  EYES:  negative for blurred vision, visual disturbance  HEENT:  negative for  hearing loss, voice change  RESPIRATORY:  negative for  dyspnea, wheezing  CARDIOVASCULAR:  negative for  chest pain, palpitations  GASTROINTESTINAL:  negative for nausea, vomiting  GENITOURINARY:  negative for frequency, urinary incontinence  HEMATOLOGIC/LYMPHATIC:  negative for bleeding and petechiae  MUSCULOSKELETAL:  positive for right hip pain  NEUROLOGICAL:  negative for headaches, dizziness  BEHAVIOR/PSYCH:  negative for increased agitation and anxiety    PHYSICAL EXAM:    VITALS:  BP (!) 152/82   Pulse 74   Temp 97.4 °F (36.3 °C)   Resp 16   SpO2 97%   CONSTITUTIONAL:  awake, alert, cooperative, no apparent distress, and appears stated age  MUSCULOSKELETAL:  Right lower Extremity:  Skin intact circumferentially  Compartments of the thigh and leg are soft and compressible  She does have significant TTP about the right hip and pain with logroll  No TTP about the knee or ankle  +5/5 DF/PF/EHL  2+ distal pulses    Secondary Exam:   bilateralUE: No obvious signs of trauma. -TTP to fingers, hand, wrist, forearm, elbow, humerus, shoulder or clavicle. -- Patient able to flex/extend fingers, wrist, elbow and shoulder with active and passive ROM without pain, +2/4 Radial pulse, cap refill <3sec, +AIN/PIN/Radial/Ulnar/Median N, distal sensation grossly intact to C4-T1 dermatomes, compartments soft and compressible. leftLE: No obvious signs of trauma. -TTP to foot, ankle, leg, knee, thigh, hip.-- Patient able to flex/extend toes, ankle, knee and hip with active and passive ROM without pain,+2/4 DP & PT pulses, cap refill <3sec, +5/5 PF/DF/EHL, distal sensation grossly intact to L4-S1 dermatomes, compartments soft and compressible. Pelvis: -TTP, -Log roll, -Heel strike     DATA:    CBC:   Lab Results   Component Value Date/Time    WBC 14.9 03/01/2023 12:18 PM    RBC 4.79 03/01/2023 12:18 PM    HGB 13.7 03/01/2023 12:18 PM    HCT 42.3 03/01/2023 12:18 PM    MCV 88.3 03/01/2023 12:18 PM    MCH 28.6 03/01/2023 12:18 PM    MCHC 32.4 03/01/2023 12:18 PM    RDW 14.3 03/01/2023 12:18 PM     03/01/2023 12:18 PM    MPV 9.4 03/01/2023 12:18 PM     PT/INR:    Lab Results   Component Value Date/Time    PROTIME 11.2 03/01/2023 01:15 PM    INR 1.0 03/01/2023 01:15 PM       Radiology Review:  3 views of the right hip reviewed. There is a mildly displaced intertrochanteric fracture.   Main fracture line is standard obliquity, with a secondary transverse fracture line through the greater trochanter. Greater trochanter is also moderately comminuted. Mild varus malalignment    X-rays of the right femur reviewed. Chronic degenerative changes about the knee. No fractures or dislocations noted. No orthopedic hardware. X-rays of the right hand and wrist reviewed.   Chronic degenerative changes noted, no acute fractures or dislocations    IMPRESSION:  Closed right intertrochanteric hip fracture    PLAN:  N.p.o. after midnight  Nonweightbearing to the right lower extremity  Patient will be admitted to medical service, appreciate preoperative risk stratification  Plan for OR tomorrow for fixation of the right intertrochanteric hip fracture  Pain control  Discuss with attending    Kavita Roman DO , PGY-2  3/1/23    Electronically signed by Kavita Roman DO on 3/1/2023 at 2:39 PM

## 2023-03-02 ENCOUNTER — APPOINTMENT (OUTPATIENT)
Dept: GENERAL RADIOLOGY | Age: 78
DRG: 481 | End: 2023-03-02
Payer: COMMERCIAL

## 2023-03-02 ENCOUNTER — ANESTHESIA (OUTPATIENT)
Dept: OPERATING ROOM | Age: 78
End: 2023-03-02
Payer: COMMERCIAL

## 2023-03-02 ENCOUNTER — ANESTHESIA EVENT (OUTPATIENT)
Dept: OPERATING ROOM | Age: 78
End: 2023-03-02
Payer: COMMERCIAL

## 2023-03-02 LAB
ALBUMIN SERPL-MCNC: 3.5 G/DL (ref 3.5–5.2)
ALP BLD-CCNC: 71 U/L (ref 35–104)
ALT SERPL-CCNC: 9 U/L (ref 0–32)
ANION GAP SERPL CALCULATED.3IONS-SCNC: 10 MMOL/L (ref 7–16)
AST SERPL-CCNC: 13 U/L (ref 0–31)
BASOPHILS ABSOLUTE: 0.07 E9/L (ref 0–0.2)
BASOPHILS RELATIVE PERCENT: 0.6 % (ref 0–2)
BILIRUB SERPL-MCNC: 0.3 MG/DL (ref 0–1.2)
BUN BLDV-MCNC: 18 MG/DL (ref 6–23)
CALCIUM SERPL-MCNC: 8.9 MG/DL (ref 8.6–10.2)
CHLORIDE BLD-SCNC: 100 MMOL/L (ref 98–107)
CO2: 25 MMOL/L (ref 22–29)
CREAT SERPL-MCNC: 0.7 MG/DL (ref 0.5–1)
EKG ATRIAL RATE: 66 BPM
EKG P AXIS: 88 DEGREES
EKG P-R INTERVAL: 168 MS
EKG Q-T INTERVAL: 434 MS
EKG QRS DURATION: 88 MS
EKG QTC CALCULATION (BAZETT): 454 MS
EKG R AXIS: 45 DEGREES
EKG T AXIS: 64 DEGREES
EKG VENTRICULAR RATE: 66 BPM
EOSINOPHILS ABSOLUTE: 0.34 E9/L (ref 0.05–0.5)
EOSINOPHILS RELATIVE PERCENT: 3 % (ref 0–6)
GFR SERPL CREATININE-BSD FRML MDRD: >60 ML/MIN/1.73
GLUCOSE BLD-MCNC: 131 MG/DL (ref 74–99)
HCT VFR BLD CALC: 39.1 % (ref 34–48)
HEMOGLOBIN: 12.4 G/DL (ref 11.5–15.5)
IMMATURE GRANULOCYTES #: 0.04 E9/L
IMMATURE GRANULOCYTES %: 0.4 % (ref 0–5)
LYMPHOCYTES ABSOLUTE: 2.7 E9/L (ref 1.5–4)
LYMPHOCYTES RELATIVE PERCENT: 23.9 % (ref 20–42)
MCH RBC QN AUTO: 29 PG (ref 26–35)
MCHC RBC AUTO-ENTMCNC: 31.7 % (ref 32–34.5)
MCV RBC AUTO: 91.4 FL (ref 80–99.9)
MONOCYTES ABSOLUTE: 0.89 E9/L (ref 0.1–0.95)
MONOCYTES RELATIVE PERCENT: 7.9 % (ref 2–12)
NEUTROPHILS ABSOLUTE: 7.28 E9/L (ref 1.8–7.3)
NEUTROPHILS RELATIVE PERCENT: 64.2 % (ref 43–80)
PDW BLD-RTO: 14.4 FL (ref 11.5–15)
PLATELET # BLD: 192 E9/L (ref 130–450)
PMV BLD AUTO: 10.2 FL (ref 7–12)
POTASSIUM REFLEX MAGNESIUM: 3.8 MMOL/L (ref 3.5–5)
RBC # BLD: 4.28 E12/L (ref 3.5–5.5)
SODIUM BLD-SCNC: 135 MMOL/L (ref 132–146)
TOTAL PROTEIN: 6.1 G/DL (ref 6.4–8.3)
WBC # BLD: 11.3 E9/L (ref 4.5–11.5)

## 2023-03-02 PROCEDURE — 3700000000 HC ANESTHESIA ATTENDED CARE: Performed by: ORTHOPAEDIC SURGERY

## 2023-03-02 PROCEDURE — 7100000001 HC PACU RECOVERY - ADDTL 15 MIN: Performed by: ORTHOPAEDIC SURGERY

## 2023-03-02 PROCEDURE — 3600000015 HC SURGERY LEVEL 5 ADDTL 15MIN: Performed by: ORTHOPAEDIC SURGERY

## 2023-03-02 PROCEDURE — 7100000000 HC PACU RECOVERY - FIRST 15 MIN: Performed by: ORTHOPAEDIC SURGERY

## 2023-03-02 PROCEDURE — 3700000001 HC ADD 15 MINUTES (ANESTHESIA): Performed by: ORTHOPAEDIC SURGERY

## 2023-03-02 PROCEDURE — 2580000003 HC RX 258: Performed by: STUDENT IN AN ORGANIZED HEALTH CARE EDUCATION/TRAINING PROGRAM

## 2023-03-02 PROCEDURE — 6370000000 HC RX 637 (ALT 250 FOR IP): Performed by: FAMILY MEDICINE

## 2023-03-02 PROCEDURE — 2580000003 HC RX 258: Performed by: INTERNAL MEDICINE

## 2023-03-02 PROCEDURE — 6370000000 HC RX 637 (ALT 250 FOR IP): Performed by: INTERNAL MEDICINE

## 2023-03-02 PROCEDURE — 3600000005 HC SURGERY LEVEL 5 BASE: Performed by: ORTHOPAEDIC SURGERY

## 2023-03-02 PROCEDURE — 94640 AIRWAY INHALATION TREATMENT: CPT

## 2023-03-02 PROCEDURE — C1769 GUIDE WIRE: HCPCS | Performed by: ORTHOPAEDIC SURGERY

## 2023-03-02 PROCEDURE — 2700000000 HC OXYGEN THERAPY PER DAY

## 2023-03-02 PROCEDURE — 2580000003 HC RX 258

## 2023-03-02 PROCEDURE — 73502 X-RAY EXAM HIP UNI 2-3 VIEWS: CPT

## 2023-03-02 PROCEDURE — 1200000000 HC SEMI PRIVATE

## 2023-03-02 PROCEDURE — 2709999900 HC NON-CHARGEABLE SUPPLY: Performed by: ORTHOPAEDIC SURGERY

## 2023-03-02 PROCEDURE — 6370000000 HC RX 637 (ALT 250 FOR IP): Performed by: STUDENT IN AN ORGANIZED HEALTH CARE EDUCATION/TRAINING PROGRAM

## 2023-03-02 PROCEDURE — 99223 1ST HOSP IP/OBS HIGH 75: CPT | Performed by: ORTHOPAEDIC SURGERY

## 2023-03-02 PROCEDURE — 3209999900 FLUORO FOR SURGICAL PROCEDURES

## 2023-03-02 PROCEDURE — 27245 TREAT THIGH FRACTURE: CPT | Performed by: ORTHOPAEDIC SURGERY

## 2023-03-02 PROCEDURE — 36415 COLL VENOUS BLD VENIPUNCTURE: CPT

## 2023-03-02 PROCEDURE — 80053 COMPREHEN METABOLIC PANEL: CPT

## 2023-03-02 PROCEDURE — 6360000002 HC RX W HCPCS

## 2023-03-02 PROCEDURE — 2500000003 HC RX 250 WO HCPCS: Performed by: STUDENT IN AN ORGANIZED HEALTH CARE EDUCATION/TRAINING PROGRAM

## 2023-03-02 PROCEDURE — 2500000003 HC RX 250 WO HCPCS

## 2023-03-02 PROCEDURE — 85025 COMPLETE CBC W/AUTO DIFF WBC: CPT

## 2023-03-02 PROCEDURE — 6360000002 HC RX W HCPCS: Performed by: INTERNAL MEDICINE

## 2023-03-02 PROCEDURE — 73552 X-RAY EXAM OF FEMUR 2/>: CPT

## 2023-03-02 PROCEDURE — 6360000002 HC RX W HCPCS: Performed by: STUDENT IN AN ORGANIZED HEALTH CARE EDUCATION/TRAINING PROGRAM

## 2023-03-02 PROCEDURE — 2720000010 HC SURG SUPPLY STERILE: Performed by: ORTHOPAEDIC SURGERY

## 2023-03-02 PROCEDURE — C1713 ANCHOR/SCREW BN/BN,TIS/BN: HCPCS | Performed by: ORTHOPAEDIC SURGERY

## 2023-03-02 PROCEDURE — 93010 ELECTROCARDIOGRAM REPORT: CPT | Performed by: INTERNAL MEDICINE

## 2023-03-02 PROCEDURE — 0QH606Z INSERTION OF INTRAMEDULLARY INTERNAL FIXATION DEVICE INTO RIGHT UPPER FEMUR, OPEN APPROACH: ICD-10-PCS | Performed by: ORTHOPAEDIC SURGERY

## 2023-03-02 DEVICE — NAIL IM L235MM DIA11MM 125DEG SHT GRN R PROX FEM TI: Type: IMPLANTABLE DEVICE | Site: FEMUR | Status: FUNCTIONAL

## 2023-03-02 DEVICE — SCREW BNE L95MM DIA10.35MM PROX FEM G TI CANN FOR TFN ADV: Type: IMPLANTABLE DEVICE | Site: FEMUR | Status: FUNCTIONAL

## 2023-03-02 DEVICE — SCREW BNE L38MM DIA5MM TIB LT GRN TI ST CANN LOK FULL THRD: Type: IMPLANTABLE DEVICE | Site: FEMUR | Status: FUNCTIONAL

## 2023-03-02 RX ORDER — DEXAMETHASONE SODIUM PHOSPHATE 10 MG/ML
INJECTION INTRAMUSCULAR; INTRAVENOUS PRN
Status: DISCONTINUED | OUTPATIENT
Start: 2023-03-02 | End: 2023-03-02 | Stop reason: SDUPTHER

## 2023-03-02 RX ORDER — MIDAZOLAM HYDROCHLORIDE 1 MG/ML
INJECTION INTRAMUSCULAR; INTRAVENOUS
Status: DISPENSED
Start: 2023-03-02 | End: 2023-03-03

## 2023-03-02 RX ORDER — CEFAZOLIN SODIUM 1 G/3ML
INJECTION, POWDER, FOR SOLUTION INTRAMUSCULAR; INTRAVENOUS PRN
Status: DISCONTINUED | OUTPATIENT
Start: 2023-03-02 | End: 2023-03-02 | Stop reason: SDUPTHER

## 2023-03-02 RX ORDER — OXYCODONE HYDROCHLORIDE 5 MG/1
5 TABLET ORAL EVERY 6 HOURS PRN
Qty: 28 TABLET | Refills: 0 | Status: SHIPPED | OUTPATIENT
Start: 2023-03-02 | End: 2023-03-09

## 2023-03-02 RX ORDER — SODIUM CHLORIDE 9 MG/ML
INJECTION, SOLUTION INTRAVENOUS PRN
Status: DISCONTINUED | OUTPATIENT
Start: 2023-03-02 | End: 2023-03-02 | Stop reason: HOSPADM

## 2023-03-02 RX ORDER — FENTANYL CITRATE 50 UG/ML
INJECTION, SOLUTION INTRAMUSCULAR; INTRAVENOUS
Status: DISPENSED
Start: 2023-03-02 | End: 2023-03-03

## 2023-03-02 RX ORDER — SODIUM CHLORIDE 9 MG/ML
INJECTION, SOLUTION INTRAVENOUS CONTINUOUS PRN
Status: DISCONTINUED | OUTPATIENT
Start: 2023-03-02 | End: 2023-03-02 | Stop reason: SDUPTHER

## 2023-03-02 RX ORDER — PHENYLEPHRINE HCL IN 0.9% NACL 1 MG/10 ML
SYRINGE (ML) INTRAVENOUS PRN
Status: DISCONTINUED | OUTPATIENT
Start: 2023-03-02 | End: 2023-03-02 | Stop reason: SDUPTHER

## 2023-03-02 RX ORDER — BUPIVACAINE HYDROCHLORIDE 7.5 MG/ML
INJECTION, SOLUTION INTRASPINAL
Status: DISCONTINUED | OUTPATIENT
Start: 2023-03-02 | End: 2023-03-02 | Stop reason: SDUPTHER

## 2023-03-02 RX ORDER — ONDANSETRON 2 MG/ML
INJECTION INTRAMUSCULAR; INTRAVENOUS PRN
Status: DISCONTINUED | OUTPATIENT
Start: 2023-03-02 | End: 2023-03-02 | Stop reason: SDUPTHER

## 2023-03-02 RX ORDER — MIDAZOLAM HYDROCHLORIDE 1 MG/ML
INJECTION INTRAMUSCULAR; INTRAVENOUS PRN
Status: DISCONTINUED | OUTPATIENT
Start: 2023-03-02 | End: 2023-03-02 | Stop reason: SDUPTHER

## 2023-03-02 RX ORDER — SODIUM CHLORIDE 0.9 % (FLUSH) 0.9 %
5-40 SYRINGE (ML) INJECTION PRN
Status: DISCONTINUED | OUTPATIENT
Start: 2023-03-02 | End: 2023-03-06 | Stop reason: HOSPADM

## 2023-03-02 RX ORDER — SODIUM CHLORIDE 0.9 % (FLUSH) 0.9 %
5-40 SYRINGE (ML) INJECTION EVERY 12 HOURS SCHEDULED
Status: DISCONTINUED | OUTPATIENT
Start: 2023-03-02 | End: 2023-03-02 | Stop reason: HOSPADM

## 2023-03-02 RX ORDER — SODIUM CHLORIDE 0.9 % (FLUSH) 0.9 %
5-40 SYRINGE (ML) INJECTION PRN
Status: DISCONTINUED | OUTPATIENT
Start: 2023-03-02 | End: 2023-03-02 | Stop reason: HOSPADM

## 2023-03-02 RX ORDER — SODIUM CHLORIDE 9 MG/ML
INJECTION, SOLUTION INTRAVENOUS PRN
Status: DISCONTINUED | OUTPATIENT
Start: 2023-03-02 | End: 2023-03-06 | Stop reason: HOSPADM

## 2023-03-02 RX ORDER — SODIUM CHLORIDE 9 MG/ML
INJECTION, SOLUTION INTRAVENOUS CONTINUOUS
Status: DISCONTINUED | OUTPATIENT
Start: 2023-03-02 | End: 2023-03-03

## 2023-03-02 RX ORDER — ROPIVACAINE HYDROCHLORIDE 5 MG/ML
INJECTION, SOLUTION EPIDURAL; INFILTRATION; PERINEURAL
Status: DISPENSED
Start: 2023-03-02 | End: 2023-03-03

## 2023-03-02 RX ORDER — SODIUM CHLORIDE 0.9 % (FLUSH) 0.9 %
5-40 SYRINGE (ML) INJECTION EVERY 12 HOURS SCHEDULED
Status: DISCONTINUED | OUTPATIENT
Start: 2023-03-02 | End: 2023-03-06 | Stop reason: HOSPADM

## 2023-03-02 RX ORDER — PROPOFOL 10 MG/ML
INJECTION, EMULSION INTRAVENOUS CONTINUOUS PRN
Status: DISCONTINUED | OUTPATIENT
Start: 2023-03-02 | End: 2023-03-02 | Stop reason: SDUPTHER

## 2023-03-02 RX ADMIN — BUDESONIDE 250 MCG: 0.25 SUSPENSION RESPIRATORY (INHALATION) at 09:17

## 2023-03-02 RX ADMIN — DEXAMETHASONE SODIUM PHOSPHATE 10 MG: 10 INJECTION INTRAMUSCULAR; INTRAVENOUS at 14:02

## 2023-03-02 RX ADMIN — ACETAMINOPHEN 650 MG: 325 TABLET ORAL at 17:02

## 2023-03-02 RX ADMIN — Medication 100 MCG: at 14:24

## 2023-03-02 RX ADMIN — ARFORMOTEROL TARTRATE 15 MCG: 15 SOLUTION RESPIRATORY (INHALATION) at 19:41

## 2023-03-02 RX ADMIN — MIDAZOLAM 1.5 MG: 1 INJECTION INTRAMUSCULAR; INTRAVENOUS at 13:14

## 2023-03-02 RX ADMIN — BUDESONIDE 250 MCG: 0.25 SUSPENSION RESPIRATORY (INHALATION) at 19:41

## 2023-03-02 RX ADMIN — Medication 100 MCG: at 13:57

## 2023-03-02 RX ADMIN — SODIUM CHLORIDE: 9 INJECTION, SOLUTION INTRAVENOUS at 17:15

## 2023-03-02 RX ADMIN — Medication 100 MCG: at 14:20

## 2023-03-02 RX ADMIN — ARFORMOTEROL TARTRATE 15 MCG: 15 SOLUTION RESPIRATORY (INHALATION) at 09:16

## 2023-03-02 RX ADMIN — ACETAMINOPHEN 650 MG: 325 TABLET ORAL at 21:28

## 2023-03-02 RX ADMIN — SODIUM CHLORIDE, PRESERVATIVE FREE 10 ML: 5 INJECTION INTRAVENOUS at 21:21

## 2023-03-02 RX ADMIN — SODIUM CHLORIDE: 9 INJECTION, SOLUTION INTRAVENOUS at 13:55

## 2023-03-02 RX ADMIN — SODIUM CHLORIDE, PRESERVATIVE FREE 10 ML: 5 INJECTION INTRAVENOUS at 09:04

## 2023-03-02 RX ADMIN — CEFAZOLIN 2000 MG: 2 INJECTION, POWDER, FOR SOLUTION INTRAMUSCULAR; INTRAVENOUS at 21:17

## 2023-03-02 RX ADMIN — SODIUM CHLORIDE: 9 INJECTION, SOLUTION INTRAVENOUS at 14:24

## 2023-03-02 RX ADMIN — Medication 100 MCG: at 14:13

## 2023-03-02 RX ADMIN — Medication 100 MCG: at 14:32

## 2023-03-02 RX ADMIN — OXYCODONE HYDROCHLORIDE 10 MG: 10 TABLET ORAL at 00:31

## 2023-03-02 RX ADMIN — OXYCODONE HYDROCHLORIDE 10 MG: 10 TABLET ORAL at 09:10

## 2023-03-02 RX ADMIN — ONDANSETRON 4 MG: 2 INJECTION INTRAMUSCULAR; INTRAVENOUS at 14:29

## 2023-03-02 RX ADMIN — OXYCODONE HYDROCHLORIDE 10 MG: 10 TABLET ORAL at 21:18

## 2023-03-02 RX ADMIN — PROPOFOL 50 MCG/KG/MIN: 10 INJECTION, EMULSION INTRAVENOUS at 13:56

## 2023-03-02 RX ADMIN — ACETAMINOPHEN 650 MG: 325 TABLET ORAL at 05:57

## 2023-03-02 RX ADMIN — BUPIVACAINE HYDROCHLORIDE IN DEXTROSE 5 MG: 7.5 INJECTION, SOLUTION SUBARACHNOID at 16:33

## 2023-03-02 RX ADMIN — CEFAZOLIN 2 G: 1 INJECTION, POWDER, FOR SOLUTION INTRAMUSCULAR; INTRAVENOUS at 13:29

## 2023-03-02 RX ADMIN — OXYCODONE HYDROCHLORIDE 10 MG: 10 TABLET ORAL at 17:02

## 2023-03-02 ASSESSMENT — PAIN DESCRIPTION - LOCATION
LOCATION: HIP
LOCATION: INCISION;HIP

## 2023-03-02 ASSESSMENT — PAIN SCALES - GENERAL
PAINLEVEL_OUTOF10: 9
PAINLEVEL_OUTOF10: 9
PAINLEVEL_OUTOF10: 8
PAINLEVEL_OUTOF10: 9

## 2023-03-02 ASSESSMENT — PAIN DESCRIPTION - ORIENTATION
ORIENTATION: RIGHT;OUTER;UPPER
ORIENTATION: RIGHT

## 2023-03-02 ASSESSMENT — PAIN DESCRIPTION - PAIN TYPE
TYPE: ACUTE PAIN
TYPE: ACUTE PAIN

## 2023-03-02 ASSESSMENT — LIFESTYLE VARIABLES: SMOKING_STATUS: 0

## 2023-03-02 ASSESSMENT — PAIN DESCRIPTION - DESCRIPTORS
DESCRIPTORS: SHOOTING;SORE;DISCOMFORT
DESCRIPTORS: ACHING;CRUSHING

## 2023-03-02 NOTE — OP NOTE
Operative Note      Patient: Venice Maloney  YOB: 1945  MRN: [id number]    Date of Procedure: 3/2/2023    Pre-Op Diagnosis: Closed comminuted intertrochanteric fracture of proximal femur, right[facility] Loyda Azul    Post-Op Diagnosis: Same       Procedure(s):  RIGHT HIP CEPHALOMEDULLARY NAIL FOR RIGHT INTERTROCHANTERIC HIP FRACTURE    Surgeon(s):  Alec Griffin MD    Assistant:   Resident: Claude Beaver, DO; Mary Stephenson DO    Anesthesia: General    Estimated Blood Loss (mL): less than 684     Complications: None    Specimens:   * No specimens in log *    Implants:  Implant Name Type Inv. Item Serial No.  Lot No. LRB No. Used Action   NAIL IM [id number] [id number] 125DEG SHT GRN R PROX FEM TI - [id number]  NAIL IM L235MM [id number] 125DEG SHT GRN R PROX FEM TI  DEPU[employer] [id number] Right 1 Implanted   SCREW BNE L95MM DIA10.35MM PROX FEM G TI BERNIE FOR TFN ADV - [id number]  SCREW BNE L95MM DIA10.35MM PROX FEM G TI BERNIE FOR TFN ADV  [employer]PU[employer]WD [id number] Right 1 Implanted   SCREW BNE L38MM DIA5MM TIB LT GRN TI ST BERNIE LESLI FULL THRD - [id number]  SCREW BNE L38MM DIA5MM TIB LT GRN TI ST BERNIE LESLI FULL THRD  DEPUY [employer]WD [id number] Right 1 Implanted         Drains: * No LDAs found *    Findings: Used a [employer] intermediate TFN-a, 11 mm.  125 degree angle. 95 mm lag screw. 38 mm interlock screw distally    Detailed Description of Procedure:     HISTORY: The patient is a 78y.o. year old female with history of above preop diagnosis. Diagnosis and typical prognosis from injury were discussed in detail. I explained the risk, benefits, expected outcome, and alternatives to the procedure. Patient verbalized understanding and elected to proceed with the surgery as outlined. OPERATIVE COURSE: The patient was seen and identified outside the operative suite, in which the operative site was marked as appropriate by patient, surgeon, staff, and anesthesia. After intubation, patient was placed on the fracture table. All bony prominences and neurovascular structures were well padded and protexted. Patient's well leg was flexed, abducted, and secured into a well-padded leg well. The operative leg was placed in gentle inline traction. After being positioned, a time out was performed indicating the appropriate identification of the patient, the procedure to be performed, and the side to be performed upon. This was agreed upon by all individuals in the room. Traction and reduction maneuver were applied to the operative leg. Fluoroscopy was brought in showing a near-anatomic reduction of the intertrochanteric hip fracture. The affected lower extremity was then sterilely prepped and draped in a standard fashion. At this point in time, a small incision proximal to the greater trochanter was used with proper hemostasis, and the guide pin for the cephalomedullary nail was then inserted percutaneously under fluoroscopic guidance in the appropriate position in the proximal femur. This was checked under both the AP and lateral.     After this was deemed to be in the appropriate position. The entry reamer was then used to access the proximal femur making sure not to disrupt the abductors. The intermediate hip nail was then slid into position appropriately. A stab incision was made for the lag screw. The guidepin was inserted under fluoroscopic guidance. Once the tip to apex distance was less than 25 mm the length of the pin was measured. This tract was then reamed. The appropriate size screw was then inserted. The nail was locked from the top down. It was then backed off a quarter turn and compressed to compress the fracture. This guide was then removed and the guide for the distal locking screw was inserted through a small stab incision. The length was measured and appropriate screw was placed.   Final x-rays were taken showing appropriate positioning of the nail with near anatomic alignment of the fracture. The incisions were then copiously irrigated with sterile normal saline. The deep tissues were closed with #0 Vicryl in a watertight fashion. The subcutaneous tissues and skin were closed with 2-0 Vicryl in an interrupted fashion. The incision sites were then dressed in a sterile manner. The patient's compartments were soft and compressible at the end of this portion of the procedure. The patient then had her legs removed from all devices. The patient was moved safely back to a hospital room bed, by multiple  individuals. The patient was taken to the PACU in stable condition. DISPOSITION: The patient was taken to PACU in stable condition. Once stable, the patient will be transferred to the floor. Orders have been provided to begin physical therapy, weight bear as tolerated Right lower extremity. Patient received a dose of Ancef preoperatively. We will continue this for 24 hours postoperatively for infection prophylaxis. The patient will also be started on chemical DVT prophylaxis. We have consulted  and case management for discharge planning and consulted the PCP for medical management.       Electronically Signed By  Jennifer Lancaster MD  3/2/2023          Electronically signed by Margo Ryan MD on 3/2/2023 at 2:27 PM

## 2023-03-02 NOTE — ANESTHESIA PROCEDURE NOTES
Spinal Block    Patient location during procedure: OR  End time: 3/2/2023 4:33 PM  Reason for block: primary anesthetic and at surgeon's request  Staffing  Performed: anesthesiologist   Anesthesiologist: Magalie Gray MD  Spinal Block  Patient position: sitting  Prep: ChloraPrep  Patient monitoring: cardiac monitor, continuous pulse ox, continuous capnometry and frequent blood pressure checks  Approach: midline  Location: L3/L4  Provider prep: mask, sterile gloves and sterile gown  Needle  Needle type: Pencan   Needle gauge: 25 G  Needle length: 3.5 in  Assessment  Sensory level: T6  Swirl obtained: Yes  CSF: clear  Attempts: 1  Hemodynamics: stable  Preanesthetic Checklist  Completed: patient identified, IV checked, site marked, risks and benefits discussed, surgical/procedural consents, equipment checked, pre-op evaluation, timeout performed, anesthesia consent given, oxygen available, monitors applied/VS acknowledged, fire risk safety assessment completed and verbalized and blood product R/B/A discussed and consented

## 2023-03-02 NOTE — CARE COORDINATION
Patient is currently in recovery for RIGHT HIP CEPHALOMEDULLARY NAIL FOR RIGHT INTERTROCHANTERIC HIP FRACTURE. Will need post op PT/OT evals orders as well as weight bearing status orders once cleared by ortho surgery. Per chart review, patient is A&O x4. Will discuss transition of care with patient once therapy evals go in.   Bishop Morris DELCID   [phone]

## 2023-03-02 NOTE — ANESTHESIA POSTPROCEDURE EVALUATION
Department of Anesthesiology  Postprocedure Note    Patient: Shani Morton  MRN: [id number]  YOB: 1945  Date of evaluation: 3/2/2023      Procedure Summary     Date: 03/02/23 Room / Location: [facility] 09 / [facility]    Anesthesia Start: 7507 Anesthesia Stop: 1294    Procedure: RIGHT HIP CEPHALOMEDULLARY NAIL FOR RIGHT INTERTROCHANTERIC HIP FRACTURE (Right: Hip) Diagnosis:       Closed comminuted intertrochanteric fracture of proximal femur, right, initial encounter ([address]      (Closed comminuted intertrochanteric fracture of proximal femur, right, initial encounter ([address] [J72.343X])    Surgeons: Mari Anderson MD Responsible Provider: Leatha Hagen MD    Anesthesia Type: general, spinal ASA Status: 4          Anesthesia Type: No value filed.     Thomas Phase I: Thomas Score: 9    Thomas Phase II:        Anesthesia Post Evaluation    Patient location during evaluation: PACU  Patient participation: complete - patient participated  Level of consciousness: awake and alert  Airway patency: patent  Nausea & Vomiting: no nausea and no vomiting  Complications: no  Cardiovascular status: blood pressure returned to baseline  Respiratory status: acceptable  Hydration status: euvolemic

## 2023-03-02 NOTE — PLAN OF CARE
Problem: Pain  Goal: Verbalizes/displays adequate comfort level or baseline comfort level  3/2/2023 0950 by Vargas Crespo RN  Outcome: Progressing     Problem: Skin/Tissue Integrity  Goal: Absence of new skin breakdown  Description: 1. Monitor for areas of redness and/or skin breakdown  2. Assess vascular access sites hourly  3. Every 4-6 hours minimum:  Change oxygen saturation probe site  4. Every 4-6 hours:  If on nasal continuous positive airway pressure, respiratory therapy assess nares and determine need for appliance change or resting period.   3/2/2023 0950 by Vargas Crespo RN  Outcome: Progressing     Problem: ABCDS Injury Assessment  Goal: Absence of physical injury  3/2/2023 0950 by Vargas Crespo RN  Outcome: Progressing     Problem: Safety - Adult  Goal: Free from fall injury  3/2/2023 0950 by Vargas Crespo RN  Outcome: Progressing

## 2023-03-02 NOTE — PROGRESS NOTES
Hospitalist Progress Note      Synopsis: Patient admitted on 3/1/2023   Patient presented with fall and inability to ambulate. Imaging revealed left intertrochanteric  Fracture. Currently n.p.o. for ORIF today. Subjective    Patient seen doing well. Pain controlled. On nasal cannula no difficulty breathing. Exam:  /61   Pulse 82   Temp 98 °F (36.7 °C) (Temporal)   Resp 16   Ht 5' 7\" (1.702 m)   Wt 191 lb 6 oz (86.8 kg)   SpO2 95%   BMI 29.97 kg/m²   General appearance: No apparent distress, appears stated age and cooperative. HEENT: Pupils equal, round, and reactive to light. Conjunctivae/corneas clear. Neck: Supple. No jugular venous distention. Trachea midline. Respiratory: Mild wheeze bilaterally  Cardiovascular: Regular rate and rhythm with normal S1/S2 without murmurs, rubs or gallops. Abdomen: Soft, non-tender, non-distended with normal bowel sounds. Musculoskeletal: No pedal edema.   Left hip tenderness  Skin:  No rashes    Neurologic: awake, alert and following commands     Medications:  Reviewed    Infusion Medications    sodium chloride      sodium chloride      sodium chloride       Scheduled Medications    midazolam        ropivacaine        sodium chloride flush  5-40 mL IntraVENous 2 times per day    ceFAZolin (ANCEF) IVPB  2,000 mg IntraVENous Q8H    [START ON 3/3/2023] aspirin  325 mg Oral BID    sodium chloride flush  10 mL IntraVENous 2 times per day    acetaminophen  650 mg Oral 4 times per day    budesonide  250 mcg Nebulization BID    arformoterol tartrate  15 mcg Nebulization BID     PRN Meds: sodium chloride flush, sodium chloride, albuterol, sodium chloride flush, sodium chloride, ondansetron **OR** ondansetron, magnesium hydroxide, acetaminophen **OR** acetaminophen, hydrALAZINE, hydrALAZINE, morphine, oxyCODONE **OR** oxyCODONE    I/O    Intake/Output Summary (Last 24 hours) at 3/2/2023 1714  Last data filed at 3/2/2023 1424  Gross per 24 hour   Intake 1000 ml Output 800 ml   Net 200 ml       Labs:   Recent Labs     03/01/23  1218 03/02/23  0437   WBC 14.9* 11.3   HGB 13.7 12.4   HCT 42.3 39.1    192       Recent Labs     03/01/23  1218 03/02/23  0437    135   K 4.1 3.8    100   CO2 26 25   BUN 13 18   CREATININE 0.6 0.7   CALCIUM 9.2 8.9       Recent Labs     03/01/23  1218 03/02/23  0437   PROT 6.8 6.1*   ALKPHOS 78 71   ALT 11 9   AST 13 13   BILITOT 0.3 0.3       Recent Labs     03/01/23  1315   INR 1.0       No results for input(s): Sabrina Ill in the last 72 hours. Chronic labs:  Lab Results   Component Value Date    CHOL 230 (H) 09/13/2017    TRIG 273 (H) 09/13/2017    HDL 37 09/13/2017    LDLCALC 138 (H) 09/13/2017    TSH 0.361 11/04/2022    INR 1.0 03/01/2023    LABA1C 6.7 (H) 11/04/2022       Radiology:  Imaging studies reviewed today. ASSESSMENT:  Left intertrochanteric femoral fracture  Left hip pain         PLAN:  N.p.o.  Procedure as per orthopedic  DVT prophylaxis  PT OT  Continue bronchodilators      Diet: ADULT DIET; Regular  Code Status: Full Code  PT/OT Eval Status:     DVT Prophylaxis:     Recommended disposition at discharge:    Possible discharge to rehab once okay with orthopedic care.  +++++++++++++++++++++++++++++++++++++++++++++++++  Jennifer Meadows MD   [facility].  +++++++++++++++++++++++++++++++++++++++++++++++++  NOTE: This report was transcribed using voice recognition software. Every effort was made to ensure accuracy; however, inadvertent computerized transcription errors may be present.

## 2023-03-02 NOTE — CONSULTS
Department of Orthopedic Surgery  Resident Consult Note          Reason for Consult: Fall, right hip pain    HISTORY OF PRESENT ILLNESS:       Patient is a 78 y.o. female who presents with right hip pain after a fall from standing. Patient is a community ambulator, uses a walker or cane, states that she tripped today and fell onto her right side. She had immediate right hip pain and inability to ambulate. Denies numbness/tingling/paresthesias. Denies any other orthopedic complaints at this time. States she does not have any significant medical history, although she does wear oxygen at night and uses multiple inhalers.     Past Medical History:        Diagnosis Date    Fluid retention in legs      Past Surgical History:        Procedure Laterality Date    DENTAL SURGERY      full mouth with dentures    TONSILLECTOMY      age 13     TUBAL LIGATION      45 years ago     TUBAL LIGATION       Current Medications:   Current Facility-Administered Medications: midazolam (VERSED) 2 MG/2ML injection, , ,   fentaNYL (SUBLIMAZE) 100 MCG/2ML injection, , ,   ropivacaine (NAROPIN) 0.5% injection, , ,   albuterol (PROVENTIL) nebulizer solution 2.5 mg, 2.5 mg, Nebulization, Q4H PRN  sodium chloride flush 0.9 % injection 10 mL, 10 mL, IntraVENous, 2 times per day  sodium chloride flush 0.9 % injection 10 mL, 10 mL, IntraVENous, PRN  0.9 % sodium chloride infusion, , IntraVENous, PRN  ondansetron (ZOFRAN-ODT) disintegrating tablet 4 mg, 4 mg, Oral, Q8H PRN **OR** ondansetron (ZOFRAN) injection 4 mg, 4 mg, IntraVENous, Q6H PRN  magnesium hydroxide (MILK OF MAGNESIA) 400 MG/5ML suspension 30 mL, 30 mL, Oral, Daily PRN  acetaminophen (TYLENOL) tablet 650 mg, 650 mg, Oral, Q6H PRN **OR** acetaminophen (TYLENOL) suppository 650 mg, 650 mg, Rectal, Q6H PRN  hydrALAZINE (APRESOLINE) injection 10 mg, 10 mg, IntraVENous, Q4H PRN  hydrALAZINE (APRESOLINE) injection 20 mg, 20 mg, IntraVENous, Q4H PRN  morphine (PF) injection 2 mg, 2 mg, IntraVENous, Q3H PRN  oxyCODONE (ROXICODONE) immediate release tablet 5 mg, 5 mg, Oral, Q4H PRN **OR** oxyCODONE HCl (OXY-IR) immediate release tablet 10 mg, 10 mg, Oral, Q4H PRN  acetaminophen (TYLENOL) tablet 650 mg, 650 mg, Oral, 4 times per day  budesonide (PULMICORT) nebulizer suspension 250 mcg, 250 mcg, Nebulization, BID  arformoterol tartrate (BROVANA) nebulizer solution 15 mcg, 15 mcg, Nebulization, BID  Allergies:  Patient has no known allergies. Social History:   TOBACCO:   reports that she has been smoking. She has a 17.50 pack-year smoking history. She has never used smokeless tobacco.  ETOH:   reports no history of alcohol use. DRUGS:   reports no history of drug use. ACTIVITIES OF DAILY LIVING:    OCCUPATION:    Family History:   No family history on file.     REVIEW OF SYSTEMS:  CONSTITUTIONAL:  negative for  fevers, chills  EYES:  negative for blurred vision, visual disturbance  HEENT:  negative for  hearing loss, voice change  RESPIRATORY:  negative for  dyspnea, wheezing  CARDIOVASCULAR:  negative for  chest pain, palpitations  GASTROINTESTINAL:  negative for nausea, vomiting  GENITOURINARY:  negative for frequency, urinary incontinence  HEMATOLOGIC/LYMPHATIC:  negative for bleeding and petechiae  MUSCULOSKELETAL:  positive for right hip pain  NEUROLOGICAL:  negative for headaches, dizziness  BEHAVIOR/PSYCH:  negative for increased agitation and anxiety    PHYSICAL EXAM:    VITALS:  /62   Pulse 60   Temp 97.7 °F (36.5 °C) (Temporal)   Resp 16   Ht 5' 7\" (1.702 m)   Wt 191 lb 6 oz (86.8 kg)   SpO2 92%   BMI 29.97 kg/m²   CONSTITUTIONAL:  awake, alert, cooperative, no apparent distress, and appears stated age  MUSCULOSKELETAL:  Right lower Extremity:  Skin intact circumferentially  Compartments of the thigh and leg are soft and compressible  She does have significant TTP about the right hip and pain with logroll  No TTP about the knee or ankle  +5/5 DF/PF/EHL  2+ distal pulses    Secondary Exam:   bilateralUE: No obvious signs of trauma. -TTP to fingers, hand, wrist, forearm, elbow, humerus, shoulder or clavicle. -- Patient able to flex/extend fingers, wrist, elbow and shoulder with active and passive ROM without pain, +2/4 Radial pulse, cap refill <3sec, +AIN/PIN/Radial/Ulnar/Median N, distal sensation grossly intact to C4-T1 dermatomes, compartments soft and compressible. leftLE: No obvious signs of trauma. -TTP to foot, ankle, leg, knee, thigh, hip.-- Patient able to flex/extend toes, ankle, knee and hip with active and passive ROM without pain,+2/4 DP & PT pulses, cap refill <3sec, +5/5 PF/DF/EHL, distal sensation grossly intact to L4-S1 dermatomes, compartments soft and compressible. Pelvis: -TTP, -Log roll, -Heel strike     DATA:    CBC:   Lab Results   Component Value Date/Time    WBC 11.3 03/02/2023 04:37 AM    RBC 4.28 03/02/2023 04:37 AM    HGB 12.4 03/02/2023 04:37 AM    HCT 39.1 03/02/2023 04:37 AM    MCV 91.4 03/02/2023 04:37 AM    MCH 29.0 03/02/2023 04:37 AM    MCHC 31.7 03/02/2023 04:37 AM    RDW 14.4 03/02/2023 04:37 AM     03/02/2023 04:37 AM    MPV 10.2 03/02/2023 04:37 AM     PT/INR:    Lab Results   Component Value Date/Time    PROTIME 11.2 03/01/2023 01:15 PM    INR 1.0 03/01/2023 01:15 PM       Radiology Review:  3 views of the right hip reviewed. There is a mildly displaced intertrochanteric fracture. Main fracture line is standard obliquity, with a secondary transverse fracture line through the greater trochanter. Greater trochanter is also moderately comminuted. Mild varus malalignment    X-rays of the right femur reviewed. Chronic degenerative changes about the knee. No fractures or dislocations noted. No orthopedic hardware. X-rays of the right hand and wrist reviewed.   Chronic degenerative changes noted, no acute fractures or dislocations    IMPRESSION:  Closed right intertrochanteric hip fracture    PLAN:  N.p.o. after midnight  Nonweightbearing to the right lower extremity  Patient will be admitted to medical service, appreciate preoperative risk stratification  Plan for OR tomorrow for fixation of the right intertrochanteric hip fracture  Pain control  Discuss with attending    Veronica Sylvester MD , PGY-2  3/1/23    Electronically signed by Veronica Sylvester MD on 3/2/2023 at 12:06 PM       I have seen and evaluated the patient and agree with the above assessment on today's visit. I have performed the key components of the history and physical examination and concur completely with the findings and plans as documented. Agree with ROS, examination, FMH, PMH, PSH, SocHx, and allergies as above. Patient physically seen and examined. Patient status post fall. Normally community ambulator using either a walker or cane. She fell was unable to bear weight found to have a right intertrochanteric hip fracture. Talked her in detail about her fracture. I explained the surgery in detail. I explained the risks and complications of surgery with the patient including but not limited to death from anesthesia, possible neurovascular damage, possible infection,  Possible wound healing issues, possible perioperative fracture, leg length discrepancy, implant failure, possible need for further surgery, etc.  Patient understood this, asked appropriate questions and decided to go forward with the procedure. Past Medical History:   Diagnosis Date    Fluid retention in legs      Past Surgical History:   Procedure Laterality Date    DENTAL SURGERY      full mouth with dentures    TONSILLECTOMY      age 13     TUBAL LIGATION      37 years ago     TUBAL LIGATION         No family history on file.   Social History     Tobacco Use    Smoking status: Every Day     Packs/day: 0.50     Years: 35.00     Pack years: 17.50     Types: Cigarettes    Smokeless tobacco: Never   Substance Use Topics    Alcohol use: No    Drug use: No     No Known Allergies        Physical Examination:   General appearance: alert, well appearing, and in no distress,  normal appearing weight. No visible signs of trauma   Mental status: alert, oriented to person, place, and time, normal mood, behavior, speech, dress, motor activity, and thought processes  Abdomen: soft, nondistended  Resp:   resp easy and unlabored, no audible wheezes note, normal symmetrical expansion of both hemithoraces  Cardiac: distal pulses palpable, skin and extremities well perfused  Neurological: alert, oriented X3, normal speech, no focal findings or movement disorder noted, motor and sensory grossly normal bilaterally, normal muscle tone, no tremors  HEENT: normochephalic atraumatic, external ears and eyes normal, sclera normal, neck supple, no nasal discharge. Extremities:   peripheral pulses normal, no edema, redness or tenderness in the calves   Skin: normal coloration, no rashes or open wounds, no suspicious skin lesions noted  Psych: Affect euthymic   Musculoskeletal:   Extremity:  Right lower extremity   Skin intact over hip   Compartments soft and compressible   Leg externally rotated   Calves soft and NT   2/4 DP and PT pulses   Sensation intact   Wiggles toes   Fires L4 L5 and S1   Pain with log roll and heel strike      Plan on cephalomedullary fixation of right intertrochanteric hip fracture        ELECTRONICALLY signed by:    Derrek Diego MD  3/2/23   This is been dictated utilizing voice recognition software. All efforts have been made to make the note accurate although inadvertent errors may be present.

## 2023-03-02 NOTE — ANESTHESIA PRE PROCEDURE
Department of Anesthesiology  Preprocedure Note       Name:  Radhika Check   Age:  78 y.o.  :  1945                                          MRN:  [id number]         Date:  3/2/2023      Surgeon: Declan Schumacher):  Elie Dawkins MD    Procedure: Procedure(s):  HIP CEPHALOMEDULLARY NAIL    Medications prior to admission:   Prior to Admission medications    Medication Sig Start Date End Date Taking?  Authorizing Provider   acetaminophen (TYLENOL) 500 MG tablet Take 2 tablets by mouth every 6 hours as needed for Fever or Pain 22   Payal Mccrary MD   albuterol sulfate HFA (PROVENTIL;VENTOLIN;PROAIR) 108 (90 Base) MCG/ACT inhaler Inhale 2 puffs into the lungs every 4 hours as needed for Wheezing or Shortness of Breath 22   Payal Mccrary MD   fluticasone-vilanterol (BREO ELLIPTA) 100-25 MCG/INH AEPB inhaler Inhale 2 puffs into the lungs daily 22   Payal Mccrary MD   lactobacillus (CULTURELLE) CAPS capsule Take 2 capsules by mouth 2 times daily 22   Payal Mccrary MD   furosemide (LASIX) 20 MG tablet Take 1 tablet by mouth daily 22   Payal Mccrary MD   Multiple Vitamin (MULTIVITAMIN) TABS tablet Take 1 tablet by mouth daily 22   Payal Mccrary MD   vitamin D (CHOLECALCIFEROL) 50 MCG (2000 UT) TABS tablet Take 1 tablet by mouth daily 22   Payal Mccrary MD   Ergocalciferol (VITAMIN D) 77215 units CAPS Take 50,000 Units by mouth once a week 11/10/22   Payal Mccrary MD       Current medications:    Current Facility-Administered Medications   Medication Dose Route Frequency Provider Last Rate Last Admin    midazolam (VERSED) 2 MG/2ML injection             ropivacaine (NAROPIN) 0.5% injection             albuterol (PROVENTIL) nebulizer solution 2.5 mg  2.5 mg Nebulization Q4H PRN Isabellar Alexx Eng MD        sodium chloride flush 0.9 % injection 10 mL  10 mL IntraVENous 2 times per day Magi Garza MD   10 mL at 23 0904    sodium chloride flush 0.9 % injection 10 mL  10 mL IntraVENous PRN Isabellar Arely Mccormick MD        0.9 % sodium chloride infusion   IntraVENous PRN Samer Arely Mccormick MD        ondansetron (ZOFRAN-ODT) disintegrating tablet 4 mg  4 mg Oral Q8H PRN Samer Arely Mccormick MD        Or    ondansetron [facility]) injection 4 mg  4 mg IntraVENous Q6H PRN Samer Arely Mccormick MD        magnesium hydroxide (MILK OF MAGNESIA) 400 MG/5ML suspension 30 mL  30 mL Oral Daily PRN Samer Arely Mccormick MD        acetaminophen (TYLENOL) tablet 650 mg  650 mg Oral Q6H PRN Samer Arely Mccormick MD        Or   Nicanor Racer acetaminophen (TYLENOL) suppository 650 mg  650 mg Rectal Q6H PRN Isabellar Arely Mccormick MD        hydrALAZINE (APRESOLINE) injection 10 mg  10 mg IntraVENous Q4H PRN Isabellar Arely Mccormick MD        hydrALAZINE (APRESOLINE) injection 20 mg  20 mg IntraVENous Q4H PRN Samer Arely Mccormick MD        morphine (PF) injection 2 mg  2 mg IntraVENous Q3H PRN Isabellar Arely Mccormick MD        oxyCODONE (ROXICODONE) immediate release tablet 5 mg  5 mg Oral Q4H PRN Viridiana Mcconnell DO        Or    oxyCODONE HCl (OXY-IR) immediate release tablet 10 mg  10 mg Oral Q4H PRN Viridiana Mcconnell DO   10 mg at 03/02/23 0910    acetaminophen (TYLENOL) tablet 650 mg  650 mg Oral 4 times per day Viridiana Mcconnell DO   650 mg at 03/02/23 0557    budesonide (PULMICORT) nebulizer suspension 250 mcg  250 mcg Nebulization BID Mally Mccormick MD   250 mcg at 03/02/23 9129    arformoterol tartrate (BROVANA) nebulizer solution 15 mcg  15 mcg Nebulization BID Samer Arely Mccormick MD   15 mcg at 03/02/23 5528       Allergies:  No Known Allergies    Problem List:    Patient Active Problem List   Diagnosis Code    Acute respiratory failure (HCC) J96.00    Pneumonia due to COVID-19 virus U07.1, J12.82    Tobacco abuse Z72.0    Vitamin D deficiency E55.9    [place] (hard of hearing) H91.90    Noncompliance Z91.199    Nocturnal oxygen desaturation G47.34    Intertrochanteric fracture of right femur, closed, initial encounter [facility]) S72.141A    History of femur fracture Z87.81       Past Medical History:        Diagnosis Date    Fluid retention in legs        Past Surgical History:        Procedure Laterality Date    DENTAL SURGERY      full mouth with dentures    TONSILLECTOMY      age 13    [de-identified] TUBAL LIGATION      37 years ago     TUBAL LIGATION         Social History:    Social History     Tobacco Use    Smoking status: Every Day     Packs/day: 0.50     Years: 35.00     Pack years: 17.50     Types: Cigarettes    Smokeless tobacco: Never   Substance Use Topics    Alcohol use: No                                Ready to quit: Not Answered  Counseling given: Not Answered      Vital Signs (Current):   Vitals:    03/02/23 0600 03/02/23 0808 03/02/23 0917 03/02/23 0940   BP:  109/62     Pulse:  67 60    Resp:  16 16 16   Temp:  36.5 °C (97.7 °F)     TempSrc:  Temporal     SpO2:  92%     Weight: 191 lb 6 oz (86.8 kg)      Height:                                                  BP Readings from Last 3 Encounters:   03/02/23 109/62   11/10/22 117/69   01/22/18 134/78       NPO Status: Time of last liquid consumption: 1030 (with pain pills)                        Time of last solid consumption: 1800                        Date of last liquid consumption: 03/02/23                        Date of last solid food consumption: 03/01/23    BMI:   Wt Readings from Last 3 Encounters:   03/02/23 191 lb 6 oz (86.8 kg)   11/10/22 181 lb (82.1 kg)   01/22/18 235 lb 6.4 oz (106.8 kg)     Body mass index is 29.97 kg/m².     CBC:   Lab Results   Component Value Date/Time    WBC 11.3 03/02/2023 04:37 AM    RBC 4.28 03/02/2023 04:37 AM    HGB 12.4 03/02/2023 04:37 AM    HCT 39.1 03/02/2023 04:37 AM    MCV 91.4 03/02/2023 04:37 AM    RDW 14.4 03/02/2023 04:37 AM     03/02/2023 04:37 AM       CMP:   Lab Results   Component Value Date/Time     03/02/2023 04:37 AM    K 3.8 03/02/2023 04:37 AM     03/02/2023 04:37 AM    CO2 25 03/02/2023 04:37 AM    BUN 18 03/02/2023 04:37 AM    CREATININE 0.7 03/02/2023 04:37 AM    GFRAA >60 09/13/2017 03:04 PM    LABGLOM >60 03/02/2023 04:37 AM    GLUCOSE 131 03/02/2023 04:37 AM    PROT 6.1 03/02/2023 04:37 AM    CALCIUM 8.9 03/02/2023 04:37 AM    BILITOT 0.3 03/02/2023 04:37 AM    ALKPHOS 71 03/02/2023 04:37 AM    AST 13 03/02/2023 04:37 AM    ALT 9 03/02/2023 04:37 AM       POC Tests: No results for input(s): POCGLU, POCNA, POCK, POCCL, POCBUN, POCHEMO, POCHCT in the last 72 hours. Coags:   Lab Results   Component Value Date/Time    PROTIME 11.2 03/01/2023 01:15 PM    INR 1.0 03/01/2023 01:15 PM    APTT 29.4 03/01/2023 01:15 PM       HCG (If Applicable): No results found for: PREGTESTUR, PREGSERUM, HCG, HCGQUANT     ABGs: No results found for: PHART, PO2ART, KDS1RHX, GOT8QBB, BEART, R1YJGOVE     Type & Screen (If Applicable):  No results found for: LABABO, LABRH    Drug/Infectious Status (If Applicable):  No results found for: HIV, HEPCAB    COVID-19 Screening (If Applicable):   Lab Results   Component Value Date/Time    COVID19 DETECTED 11/02/2022 12:35 PM           Anesthesia Evaluation  Patient summary reviewed and Nursing notes reviewed   history of anesthetic complications: PONV. Airway: Mallampati: III  TM distance: >3 FB   Neck ROM: full  Mouth opening: > = 3 FB   Dental:    (+) upper dentures and lower dentures      Pulmonary:   (+) pneumonia (COVID-19 infection in Nov 2022 per pt): resolved,  sleep apnea: on CPAP,  decreased breath sounds     (-) not a current smoker          Patient did not smoke on day of surgery.                 ROS comment: Pt on inhalers PULMICORT and Bravana; last time taken this AM   Cardiovascular:  Exercise tolerance: poor (<4 METS),           Rhythm: regular  Rate: normal           Beta Blocker:  Not on Beta Blocker         Neuro/Psych:               GI/Hepatic/Renal:             Endo/Other:                     Abdominal:   (+) obese,           Vascular: Other Findings: Pt alert but forgetful. EKG 3/1/2023  Normal sinus rhythm with sinus arrhythmia  Normal ECG  When compared with ECG of 02-NOV-2022 10:02,  Significant changes have occurred      Specimen Collected: 03/01/23 12:25 EST        XR HIP 3/1/2023  FINDINGS:   There is mildly angulated right intertrochanteric fracture and adjacent soft   tissue swelling. CT HEAD 3/1/2023  Impression   1. Leonardo Setter is no acute intracranial abnormality.  Specifically, there is no   intracranial hemorrhage. 2. Atrophy and periventricular leukomalacia,   3. 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 Anesthesia Plan      general and spinal     ASA 4       Induction: intravenous. Anesthetic plan and risks discussed with patient. Use of blood products discussed with patient whom consented to blood products.                      Moris Leslie RN   3/2/2023  Chart reviewed pt id agree with plan CHRISTINA Soler - AUGUSTO

## 2023-03-02 NOTE — DISCHARGE INSTRUCTIONS
[facility] Department of Orthopedic Surgery  [address] Victory Socks, DO         MD Dr. Maxim David MD Orren Prudent, PA-C Clyde Rhea PA-C Reginald Blazing PA-C      Orthopaedics Discharge Instructions   Weight bearing Status - Weight bearing as tolerated - on right lower Extremity  Pain medication Per Prescriptions  Contact Office for Medication Refill- [id number] can refill pain med every 7 days  If patient discharging to facility then pain control will be continued per facility physician  Ice to operative/injured site for 15-30 minutes of each hour for next 5 days    Recommend that you continue to ice the area 2-3 times per day after this   Elevate operative/injured limb on 2 pillows at home  Goal is to have limb above the heart if able  Continue DVT Prophylaxis (blood clot prevention) as Prescribed: Aspirin 325 twice daily   Wound care - Can take off the dressing at the surgical site seven days after the date of surgery. Can just peel off. After, do daily dressing changes as needed until the drainage from the surgical site ceases   Fracture Care -  Weight bearing as tolerated on operative extremity. Follow Up in Office in 2 weeks. Your first post op appointment is often with one of our PAs. Call the office at [phone] or directions or with any questions. Watch for these significant complications. Call physician if they or any other problems occur:  Fever over 101°, redness, swelling or warmth at the operative site  Unrelieved nausea    Foul smelling or cloudy drainage at the operative site   Unrelieved pain    Blood soaked dressing.  (Some oozing may be normal)     Numb, pale, blue, cold or tingling extremity    Future Appointments   Date Time Provider Marshall Goodwin   3/17/2023  9:00 AM SCHEDULE, SE ORTHO RES [facility]         It is the Department of Orthopaedic Trauma's standard of practice that providers will de-escalate(wean) all patients from narcotic(opioid) medications during the post-operative period. We provide multimodal pain control but opioid medications are tapered in all of our patients. If patient requires referral to pain management for prolonged taper off of opioid pain medication we will facilitate this process.

## 2023-03-03 LAB
ANION GAP SERPL CALCULATED.3IONS-SCNC: 12 MMOL/L (ref 7–16)
BUN BLDV-MCNC: 19 MG/DL (ref 6–23)
CALCIUM SERPL-MCNC: 8.5 MG/DL (ref 8.6–10.2)
CHLORIDE BLD-SCNC: 106 MMOL/L (ref 98–107)
CO2: 23 MMOL/L (ref 22–29)
CREAT SERPL-MCNC: 0.6 MG/DL (ref 0.5–1)
GFR SERPL CREATININE-BSD FRML MDRD: >60 ML/MIN/1.73
GLUCOSE BLD-MCNC: 136 MG/DL (ref 74–99)
HCT VFR BLD CALC: 34.1 % (ref 34–48)
HEMOGLOBIN: 10.6 G/DL (ref 11.5–15.5)
MCH RBC QN AUTO: 29.9 PG (ref 26–35)
MCHC RBC AUTO-ENTMCNC: 31.1 % (ref 32–34.5)
MCV RBC AUTO: 96.1 FL (ref 80–99.9)
PDW BLD-RTO: 14 FL (ref 11.5–15)
PLATELET # BLD: 187 E9/L (ref 130–450)
PMV BLD AUTO: 10.5 FL (ref 7–12)
POTASSIUM REFLEX MAGNESIUM: 4.6 MMOL/L (ref 3.5–5)
RBC # BLD: 3.55 E12/L (ref 3.5–5.5)
SODIUM BLD-SCNC: 141 MMOL/L (ref 132–146)
WBC # BLD: 11.8 E9/L (ref 4.5–11.5)

## 2023-03-03 PROCEDURE — 97530 THERAPEUTIC ACTIVITIES: CPT

## 2023-03-03 PROCEDURE — 97535 SELF CARE MNGMENT TRAINING: CPT

## 2023-03-03 PROCEDURE — 2580000003 HC RX 258: Performed by: STUDENT IN AN ORGANIZED HEALTH CARE EDUCATION/TRAINING PROGRAM

## 2023-03-03 PROCEDURE — 80048 BASIC METABOLIC PNL TOTAL CA: CPT

## 2023-03-03 PROCEDURE — 6360000002 HC RX W HCPCS: Performed by: INTERNAL MEDICINE

## 2023-03-03 PROCEDURE — 6370000000 HC RX 637 (ALT 250 FOR IP): Performed by: STUDENT IN AN ORGANIZED HEALTH CARE EDUCATION/TRAINING PROGRAM

## 2023-03-03 PROCEDURE — 97161 PT EVAL LOW COMPLEX 20 MIN: CPT

## 2023-03-03 PROCEDURE — 36415 COLL VENOUS BLD VENIPUNCTURE: CPT

## 2023-03-03 PROCEDURE — 1200000000 HC SEMI PRIVATE

## 2023-03-03 PROCEDURE — 2580000003 HC RX 258: Performed by: INTERNAL MEDICINE

## 2023-03-03 PROCEDURE — 2700000000 HC OXYGEN THERAPY PER DAY

## 2023-03-03 PROCEDURE — 85027 COMPLETE CBC AUTOMATED: CPT

## 2023-03-03 PROCEDURE — 6360000002 HC RX W HCPCS: Performed by: STUDENT IN AN ORGANIZED HEALTH CARE EDUCATION/TRAINING PROGRAM

## 2023-03-03 PROCEDURE — 94640 AIRWAY INHALATION TREATMENT: CPT

## 2023-03-03 PROCEDURE — 97165 OT EVAL LOW COMPLEX 30 MIN: CPT

## 2023-03-03 PROCEDURE — 6370000000 HC RX 637 (ALT 250 FOR IP): Performed by: FAMILY MEDICINE

## 2023-03-03 PROCEDURE — 6370000000 HC RX 637 (ALT 250 FOR IP): Performed by: INTERNAL MEDICINE

## 2023-03-03 RX ORDER — ENOXAPARIN SODIUM 100 MG/ML
40 INJECTION SUBCUTANEOUS DAILY
Status: DISCONTINUED | OUTPATIENT
Start: 2023-03-03 | End: 2023-03-06 | Stop reason: HOSPADM

## 2023-03-03 RX ADMIN — SODIUM CHLORIDE: 9 INJECTION, SOLUTION INTRAVENOUS at 01:00

## 2023-03-03 RX ADMIN — ACETAMINOPHEN 650 MG: 325 TABLET ORAL at 17:26

## 2023-03-03 RX ADMIN — CEFAZOLIN 2000 MG: 2 INJECTION, POWDER, FOR SOLUTION INTRAMUSCULAR; INTRAVENOUS at 05:07

## 2023-03-03 RX ADMIN — ENOXAPARIN SODIUM 40 MG: 100 INJECTION SUBCUTANEOUS at 11:48

## 2023-03-03 RX ADMIN — ACETAMINOPHEN 650 MG: 325 TABLET ORAL at 05:19

## 2023-03-03 RX ADMIN — ASPIRIN 325 MG: 325 TABLET, COATED ORAL at 21:08

## 2023-03-03 RX ADMIN — SODIUM CHLORIDE, PRESERVATIVE FREE 10 ML: 5 INJECTION INTRAVENOUS at 21:09

## 2023-03-03 RX ADMIN — ACETAMINOPHEN 650 MG: 325 TABLET ORAL at 01:21

## 2023-03-03 RX ADMIN — ARFORMOTEROL TARTRATE 15 MCG: 15 SOLUTION RESPIRATORY (INHALATION) at 08:41

## 2023-03-03 RX ADMIN — ACETAMINOPHEN 650 MG: 325 TABLET ORAL at 11:14

## 2023-03-03 RX ADMIN — ASPIRIN 325 MG: 325 TABLET, COATED ORAL at 08:32

## 2023-03-03 RX ADMIN — SODIUM CHLORIDE: 9 INJECTION, SOLUTION INTRAVENOUS at 11:19

## 2023-03-03 RX ADMIN — BUDESONIDE 250 MCG: 0.25 SUSPENSION RESPIRATORY (INHALATION) at 08:41

## 2023-03-03 RX ADMIN — OXYCODONE HYDROCHLORIDE 10 MG: 10 TABLET ORAL at 05:08

## 2023-03-03 RX ADMIN — BUDESONIDE 250 MCG: 0.25 SUSPENSION RESPIRATORY (INHALATION) at 19:36

## 2023-03-03 RX ADMIN — ARFORMOTEROL TARTRATE 15 MCG: 15 SOLUTION RESPIRATORY (INHALATION) at 19:36

## 2023-03-03 RX ADMIN — SODIUM CHLORIDE, PRESERVATIVE FREE 10 ML: 5 INJECTION INTRAVENOUS at 21:10

## 2023-03-03 RX ADMIN — OXYCODONE HYDROCHLORIDE 10 MG: 10 TABLET ORAL at 11:17

## 2023-03-03 RX ADMIN — OXYCODONE HYDROCHLORIDE 10 MG: 10 TABLET ORAL at 17:26

## 2023-03-03 ASSESSMENT — PAIN SCALES - GENERAL
PAINLEVEL_OUTOF10: 9
PAINLEVEL_OUTOF10: 9
PAINLEVEL_OUTOF10: 8
PAINLEVEL_OUTOF10: 8
PAINLEVEL_OUTOF10: 9
PAINLEVEL_OUTOF10: 2

## 2023-03-03 ASSESSMENT — PAIN DESCRIPTION - DESCRIPTORS
DESCRIPTORS: ACHING;CRAMPING
DESCRIPTORS: ACHING;DULL;DISCOMFORT
DESCRIPTORS: ACHING;BURNING
DESCRIPTORS: ACHING;CRUSHING

## 2023-03-03 ASSESSMENT — PAIN DESCRIPTION - FREQUENCY: FREQUENCY: INTERMITTENT

## 2023-03-03 ASSESSMENT — PAIN DESCRIPTION - LOCATION
LOCATION: INCISION;LEG
LOCATION: HIP

## 2023-03-03 ASSESSMENT — PAIN DESCRIPTION - ORIENTATION
ORIENTATION: RIGHT

## 2023-03-03 ASSESSMENT — PAIN DESCRIPTION - PAIN TYPE: TYPE: ACUTE PAIN;SURGICAL PAIN

## 2023-03-03 ASSESSMENT — PAIN - FUNCTIONAL ASSESSMENT: PAIN_FUNCTIONAL_ASSESSMENT: PREVENTS OR INTERFERES SOME ACTIVE ACTIVITIES AND ADLS

## 2023-03-03 NOTE — PROGRESS NOTES
Hospitalist Progress Note      Synopsis: Patient admitted on 3/1/2023   Patient presented with fall and inability to ambulate. Imaging revealed left intertrochanteric  Fracture. Currently n.p.o. for ORIF today. Subjective    Patient seen doing well. Pain controlled. On nasal cannula no difficulty breathing. Exam:  /61   Pulse 64   Temp 97.2 °F (36.2 °C) (Temporal)   Resp 18   Ht 5' 7\" (1.702 m)   Wt 203 lb 7.8 oz (92.3 kg)   SpO2 94%   BMI 31.87 kg/m²   General appearance: No apparent distress, appears stated age and cooperative. HEENT: Pupils equal, round, and reactive to light. Conjunctivae/corneas clear. Neck: Supple. No jugular venous distention. Trachea midline. Respiratory: Mild wheeze bilaterally  Cardiovascular: Regular rate and rhythm with normal S1/S2 without murmurs, rubs or gallops. Abdomen: Soft, non-tender, non-distended with normal bowel sounds. Musculoskeletal: No pedal edema.   Left hip tenderness  Skin:  No rashes    Neurologic: awake, alert and following commands     Medications:  Reviewed    Infusion Medications    sodium chloride      sodium chloride       Scheduled Medications    enoxaparin  40 mg SubCUTAneous Daily    sodium chloride flush  5-40 mL IntraVENous 2 times per day    aspirin  325 mg Oral BID    sodium chloride flush  10 mL IntraVENous 2 times per day    acetaminophen  650 mg Oral 4 times per day    budesonide  250 mcg Nebulization BID    arformoterol tartrate  15 mcg Nebulization BID     PRN Meds: sodium chloride flush, sodium chloride, albuterol, sodium chloride flush, sodium chloride, ondansetron **OR** ondansetron, magnesium hydroxide, acetaminophen **OR** acetaminophen, morphine, oxyCODONE **OR** oxyCODONE    I/O    Intake/Output Summary (Last 24 hours) at 3/3/2023 1759  Last data filed at 3/3/2023 1553  Gross per 24 hour   Intake --   Output 2400 ml   Net -2400 ml       Labs:   Recent Labs     03/01/23  1218 03/02/23  0437 03/03/23  0642   WBC 14.9* 11.3 11.8*   HGB 13.7 12.4 10.6*   HCT 42.3 39.1 34.1    192 187       Recent Labs     03/01/23  1218 03/02/23  0437 03/03/23  0642    135 141   K 4.1 3.8 4.6    100 106   CO2 26 25 23   BUN 13 18 19   CREATININE 0.6 0.7 0.6   CALCIUM 9.2 8.9 8.5*       Recent Labs     03/01/23  1218 03/02/23  0437   PROT 6.8 6.1*   ALKPHOS 78 71   ALT 11 9   AST 13 13   BILITOT 0.3 0.3       Recent Labs     03/01/23  1315   INR 1.0       No results for input(s): Ze Carson in the last 72 hours. Chronic labs:  Lab Results   Component Value Date    CHOL 230 (H) 09/13/2017    TRIG 273 (H) 09/13/2017    HDL 37 09/13/2017    LDLCALC 138 (H) 09/13/2017    TSH 0.361 11/04/2022    INR 1.0 03/01/2023    LABA1C 6.7 (H) 11/04/2022       Radiology:  Imaging studies reviewed today. ASSESSMENT:  Left intertrochanteric femoral fracture status post right hip cephalomedullary nail on March 2  Left hip pain  Chronic hypoxic aspiratory failure currently on oxygen 4 L/min       PLAN:    DVT prophylaxis Lovenox  PT OT  Continue bronchodilators      Diet: ADULT DIET; Regular  Code Status: Full Code  PT/OT Eval Status:     DVT Prophylaxis:     Recommended disposition at discharge:    Patient is medically cleared for discharge. Awaiting placement  +++++++++++++++++++++++++++++++++++++++++++++++++  Matteo Ragsdale MD   [facility].  +++++++++++++++++++++++++++++++++++++++++++++++++  NOTE: This report was transcribed using voice recognition software. Every effort was made to ensure accuracy; however, inadvertent computerized transcription errors may be present.

## 2023-03-03 NOTE — PROGRESS NOTES
Department of Orthopedic Surgery  Resident Progress Note    Patient seen and examined. Her pain is well controlled, she states she has been urinating but has not had a bowel movement yet. Has not gotten up and walk yet. Denies any numbness or weakness    VITALS:  BP (!) 121/56   Pulse 76   Temp 96.8 °F (36 °C) (Oral)   Resp 17   Ht 5' 7\" (1.702 m)   Wt 203 lb 7.8 oz (92.3 kg)   SpO2 96%   BMI 31.87 kg/m²     General: alert and oriented to person, place and time, well-developed and well-nourished, in no acute distress    MUSCULOSKELETAL:   right lower extremity:  Aquacel dressings C/D/I  Compartments soft and compressible  +PF/DF/EHL  +2/4 DP & PT pulses, Brisk Cap refill, Toes warm and perfused  Distal sensation grossly intact to Peroneals, Sural, Saphenous, and tibial nrs    CBC:   Lab Results   Component Value Date/Time    WBC 11.3 03/02/2023 04:37 AM    HGB 12.4 03/02/2023 04:37 AM    HCT 39.1 03/02/2023 04:37 AM     03/02/2023 04:37 AM     PT/INR:    Lab Results   Component Value Date/Time    PROTIME 11.2 03/01/2023 01:15 PM    INR 1.0 03/01/2023 01:15 PM         ASSESSMENT  S/P right hip cephalomedullary nail on 3/2    PLAN      Continue physical therapy and protocol: WBAT -right LE  24 hour abx coverage  Deep venous thrombosis prophylaxis -aspirin, okay to start today, early mobilization  PT/OT  Pain Control: IV and PO  Monitor H&H  D/C Plan: Patient will likely go to SNF, [place] [id number]   Social work input appreciated    Mary Stephenson DO PGY 2  3/3/23    Electronically signed by Mary Stephenson DO on 3/3/2023 at 7:01 AM

## 2023-03-03 NOTE — PROGRESS NOTES
[address]        Date:3/3/2023                                                  Patient Name: Maria Fernanda Butt    MRN: [id number]    : 1945    Room: [address] [place] [address]          Evaluating OT: Isabel Fink OTR/L; [id number]       Referring Provider: Roosevelt Acuna DO    Specific Provider Orders/Date: OT Eval and Treat 23      Diagnosis: Intertrochanteric fracture of right femur - fall from standing. Surgery: 3/2/23 RIGHT HIP CEPHALOMEDULLARY NAIL FOR RIGHT INTERTROCHANTERIC HIP FRACTURE. Pertinent Medical History:  has a past medical history of Fluid retention in legs.      Recommended Adaptive Equipment: AE for LE bathing and dressing PRN     Precautions:  Fall Risk, TSM, O2, [place], +alarms, WBAT RLE, purewick     Assessment of current deficits    [x] Functional mobility  [x]ADLs  [x] Strength               []Cognition    [x] Functional transfers   [x] IADLs         [x] Safety Awareness   [x]Endurance    [x] Fine Coordination              [x] Balance      [] Vision/perception   []Sensation     []Gross Motor Coordination  [] ROM  [] Delirium                   [] Motor Control     OT PLAN OF CARE   OT POC based on physician orders, patient diagnosis and results of clinical assessment    Frequency/Duration 3-5 days/wk for 2 weeks PRN   Specific OT Treatment Interventions to include:   * Instruction/training on adapted ADL techniques and AE recommendations to increase functional independence within precautions       * Training on energy conservation strategies, correct breathing pattern and techniques to improve independence/tolerance for self-care routine  * Functional transfer/mobility training/DME recommendations for increased independence, safety, and fall prevention  * Patient/Family education to increase follow through with safety techniques and functional independence  * Recommendation of environmental modifications for increased safety with functional transfers/mobility and ADLs  * Therapeutic exercise to improve motor endurance, ROM, and functional strength for ADLs/functional transfers  * Therapeutic activities to facilitate/challenge dynamic balance, stand tolerance for increased safety and independence with ADLs  * Positioning to improve skin integrity, interaction with environment and functional independence    Home Living: Pt lives between daughter & granddaughters mobile homes. Granddaughter's mobile home has 4 steps & 2 handrails to enter. Daughter' mobile home has 4+1 step & 1 handrail to enter   Bathroom setup: Tub/shower at both homes. Equipment owned: ww, quad cane, shower chair, O2    Prior Level of Function: mod I with ADLs , mod I with IADLs; engaged in functional mobility with use of  ww in home, quad cane in community. Driving: Yes  Occupation: None reported    Pain Level: Pt c/o mild RLE pain with light movement.   Cognition: A&O: 4/4; Follows 1-2 step directions   Memory:  Good   Sequencing:  Fair   Problem solving:  Fair   Judgement/safety:  Fair     Functional Assessment:  AM-PAC Daily Activity Raw Score: 13/24   Initial Eval Status  Date: 3/3/23 Treatment Status  Date: STGs = LTGs  Time frame: 10-14 days   Feeding Setup   Independent    Grooming Minimal Assist   Modified [place]    UB Dressing Minimal Assist   Modified [place]    LB Dressing Dependent   Stand by Assist    Bathing Moderate Assist  Stand by Assist    Toileting Dependent   Stand by Assist    Bed Mobility  Supine to sit: Moderate Assist   Sit to supine: Maximal Assist   Supine to sit: Stand by Assist   Sit to supine: Stand by Assist    Functional Transfers Sit to stand:Maximal Assist   Stand to sit:Maximal Assist  Stand pivot: NT  Commode: NT  Sit to stand:Stand by Assist   Stand to sit:Stand by Assist  Stand pivot: Stand by Assist  Commode: Stand by Assist Functional Mobility Moderate Assist  Use of ww to take ~3 side steps towards HOB. Stand by Assist with use of Appropriate AD   Balance Sitting:     Static - SBA     Dynamic - SBA  Standing: Moderate Assist w/ ww  Sitting:     Static: Independent     Dynamic: Independent  Standing: Stand by Assist   Activity Tolerance Fair  Good   Visual/  Perceptual Glasses: Yes  Appears WFL      Safety Fair  Good  during ADL completion following WBAT RLE precautions     Hand Dominance Right   AROM (PROM) Strength Additional Info:    RUE  WFL 4/5 grossly tested good  and wfl FMC/dexterity noted during ADL tasks     LUE WFL 4/5 grossly tested Good  and wfl FMC/dexterity noted during ADL tasks       Hearing: [place]  Sensation:  No c/o numbness or tingling BUE  Tone: WFL BUE  Edema: Unremarkable    Comment: Cleared by RN to see pt. Upon arrival patient supine in bed and agreeable to OT session. At end of session, patient seated upright in bed supported with call light and phone within reach, all lines and tubes intact. Overall patient demonstrated decreased independence and safety during completion of ADL/functional transfer/mobility tasks. Therapist facilitated ADL tasks, functional transfers, functional mobility, bed mobility to address safety awareness, implementation of fall prevention strategies, & functional engagement throughout daily activities. Pt educated on activity modifications/adaptations & WBAT RLE to maintain skin/joint integrity & proper body mechanics throughout daily activities. Pt would benefit from continued skilled OT to increase safety and independence with completion of ADL/IADL tasks for functional independence and quality of life. Treatment: OT treatment provided this date includes: ADL-  Instruction/training on safety and adapted techniques for completion of ADLs: Pt required assist to don socks at bed-level to maintain proper body mechanics.  Pt required assist to tie gown seated EOB while maintaining sitting balance. Pt washed/brushed hair seated EOB demo'ing fair sitting balance requiring x1 seated rest break ~30 seconds d/t fatigue. Pt educated on activity modifications/adaptations to promote compliance of WBAT RLE throughout ADLs. Mobility-  Instruction/training on safety and improved independence with bed mobility/functional transfers and functional mobility. Pt required use of ww to maintain static/dynamic standing balance. Pt required verbal cues for proper hand placement w/ ww & assist to maintain proper body mechanics/safe transfer progressions throughout session. Pt able to heel to toe side step with RLE requiring increased time d/t pain with movement. Sitting EOB ~10 minutes to improve dynamic sitting balance and activity tolerance during ADLs. Activity tolerance- Instruction/training on energy conservation/work simplification for completion of ADLs. Skilled positioning/alignment-  Proper Positioning/Alignment. Pt required assist/cues to maintain proper body mechanics/WBAT RLE throughout session to promote skin/joint integrity, safety awareness, & implementation of fall prevention strategies throughout daily activities. Rehab Potential: Good for established goals     LTG: maximize independence with ADLs to return to PLOF    Patient and/or family were instructed on functional diagnosis, prognosis/goals and OT plan of care. Demonstrated fair understanding. Eval Complexity: Low  History: Expanded chart review of medical records and additional review of physical, cognitive, or psychosocial history related to current functional performance  Exam: 3+ performance deficits  Assistance/Modification: Min/mod assistance or modifications required to perform tasks. May have comorbidities that affect occupational performance.     Evaluation time includes thorough review of current medical information, gathering information on past medical & social history & PLOF, completion of standardized testing, informal observation of tasks, consultation with other medical professions/disciplines, assessment of data & development of POC/goals. Time In: 9:05a  Time Out: 9:43a  Total Treatment Time: 23 minutes    Min Units   OT Eval Low 10094  x     OT Eval Medium 08947      OT Eval High 33476      OT Re-Eval [id number]       Therapeutic Ex 79078       Therapeutic Activities 90627  15 1    ADL/Self Care 56946  8 1    Orthotic Management 42634       Manual 24292     Neuro Re-Ed 30595       Non-Billable Time          Evaluation Time additionally includes thorough review of current medical information, gathering information on past medical history/social history and prior level of function, interpretation of standardized testing/informal observation of tasks, assessment of data and development of plan of care and goals.               Blair Morin OTR/L; [id number] Dr Dunne Dr Dunne Dr Dunne

## 2023-03-03 NOTE — PROGRESS NOTES
Physical Therapy  Physical Therapy Initial Assessment     Name: Ellen Garza  : 1945  MRN: [id number]      Date of Service: 3/3/2023    Evaluating PT:  Andres Stuart PT, DPT, [id number]    Room #:  9377/2559-L  Diagnosis:  Intertrochanteric fracture of right femur, closed, initial encounter ([facility] [S72.141A]  History of femur fracture [Z87.81]  PMHx/PSHx:    Past Medical History:   Diagnosis Date    Fluid retention in legs       Past Surgical History:   Procedure Laterality Date    DENTAL SURGERY      full mouth with dentures    HIP FRACTURE SURGERY Right 3/2/2023    RIGHT HIP CEPHALOMEDULLARY NAIL FOR RIGHT INTERTROCHANTERIC HIP FRACTURE performed by Maria Fernanda Tijerina MD at [facility]      age 13     TUBAL LIGATION      43 years ago     TUBAL LIGATION        Procedure/Surgery:  RIGHT HIP CEPHALOMEDULLARY NAIL FOR RIGHT INTERTROCHANTERIC HIP FRACTURE 3/2/23  Precautions:  Fall Risk, WBAT RLE, TSM, Bed Alarm, O2  Equipment Needs:  TBD    SUBJECTIVE:    Pt lives between granddaughter and daughter's mobile homes. Granddaughter's mobile home has 4 steps and 2 handrails to enter. Daughter' mobile home has 4+1 step and 1 handrail to enter Bed is on 1 floor and bath is on 1 floor. Pt ambulated with Hurrycane>WW PTA. Pt is on 1L/min O2 at home. OBJECTIVE:   Initial Evaluation  Date: 3/3/23 Treatment Short Term/ Long Term   Goals   AM-PAC 6 Clicks 50/81     Was pt agreeable to Eval/treatment? yes     Does pt have pain?  No pain at rest, increased pain with movement     Bed Mobility  Rolling: NT  Supine to sit: MaxA  Sit to supine: MaxA  Scooting: ModA  Rolling: SBA  Supine to sit: SBA  Sit to supine: SBA  Scooting: SBA   Transfers Sit to stand: ModA  Stand to sit: ModA  Stand pivot: NT  Sit to stand: SBA  Stand to sit: SBA  Stand pivot: SBA with AD   Ambulation    Side stepped at EOB with [employer] ModA  >150 feet with AD SBA   Stair negotiation: ascended and descended NT  5+ steps with 1 rail SBA ROM BUE:  Refer to OT note  LLE:  WFL  RLE: limited by pain     Strength BUE:  refer to OT note   LLE:  5/5  RLE: 3/5 d/t pain  Improve by 1/3 MMT   Balance Sitting EOB:  SBA  Dynamic Standing:  ModA with [employer]  Sitting EOB:  Independent   Dynamic Standing:  SBA with AD     Pt is A & O x 4  Sensation:  Pt denies numbness and tingling to extremities  Edema:  unremarkable    Vitals:  Heart Rate at rest 75 bpm Heart Rate with Activity: 82 bpm Heart Rate post session 80 bpm   SPO2 at rest 96% 4L/min SPO2 with acitivty: 87% 4L/min SPO2 post session 98% 4L/min       Patient education  Pt educated on role of PT, WBAT RLE, and safety with functional mobility    Patient response to education:   Pt verbalized understanding Pt demonstrated skill Pt requires further education in this area   x With assist x     ASSESSMENT:    Conditions Requiring Skilled Therapeutic Intervention:    [x]Decreased strength     [x]Decreased ROM  [x]Decreased functional mobility  [x]Decreased balance   [x]Decreased endurance   []Decreased posture  [x]Decreased sensation  []Decreased coordination   []Decreased vision  [x]Decreased safety awareness   [x]Increased pain       Comments:  Medically cleared for session by RN. Pt was in bed upon Pt entry and agreeable to participate. Pt required increased time to complete all functional mobility this session as pt likes to attempt mobility independently before having assistance and pain. Pt required heavy assist and verbal/tactile cues for sequencing supine<>sit transfer. Pt maintained fair seated balance at EOB throughout session. Sit<>stand transfer completed (x2) with lift assist and verbal cues for hand placement. Pt side stepped at EOB with assist for [employer] management and balance and given verbal cues for sequencing. Pt was seated EOB and returned to supine. Pt positioned in chair position with all needs met and call light in reach at conclusion of session. SPO2 closely monitored throughout session.  RN notified. Pt would benefit from continued skilled PT services upon d/c. Treatment:  Patient practiced and was instructed in the following treatment:    Bed mobility training - pt given verbal and tactile cues to facilitate proper sequencing and safety during rolling and supine>sit as well as provided with physical assistance to complete task   Sitting EOB for >10 minutes for upright tolerance, postural awareness and BLE ROM  Transfer training - pt was given verbal and tactile cues to facilitate proper hand placement, technique and safety during sit to stand and stand to sit as well as provided with physical assistance. Gait training- pt was given verbal and tactile cues to facilitate balance, [employer] management, sequencing, and safety during ambulation as well as provided with physical assistance. Skilled positioning - Pt placed in the chair position with pillows utilized to facilitate upright posture, joint and skin integrity, and interaction with environment. Skilled monitoring of vitals throughout session. Pt's/ family goals   1. To return to PLOF    Prognosis is good for reaching above PT goals. Patient and or family understand(s) diagnosis, prognosis, and plan of care.   yes    PHYSICAL THERAPY PLAN OF CARE:    PT POC is established based on physician order and patient diagnosis     Referring provider/PT Order:    03/02/23 1700  PT eval and treat  Start:  03/02/23 1700,   End:  03/02/23 1700,   ONE TIME,   Standing Count:  1 Occurrences,   R         Kevin Ceballos,      Diagnosis:  Intertrochanteric fracture of right femur, closed, initial encounter ([facility] [S72.141A]  History of femur fracture [Z87.81]  Specific instructions for next treatment:  improve independence with transfers, increase ambulation distance as tolerated    Current Treatment Recommendations:     [x] Strengthening to improve independence with functional mobility   [x] ROM to improve independence with functional mobility   [x] Balance Training to improve static/dynamic balance and to reduce fall risk  [x] Endurance Training to improve activity tolerance during functional mobility   [x] Transfer Training to improve safety and independence with all functional transfers   [x] Gait Training to improve gait mechanics, endurance and assess need for appropriate assistive device  [x] Stair Training in preparation for safe discharge home and/or into the community   [x] Positioning to prevent skin breakdown and contractures  [x] Safety and Education Training   [x] Patient/Caregiver Education   [] HEP  [x] Other     PT long term treatment goals are located in above grid    Frequency of treatments: 2-5x/week x 1-2 weeks. Time in  1205  Time out  1243    Total Treatment Time  23 minutes     Evaluation Time includes thorough review of current medical information, gathering information on past medical history/social history and prior level of function, completion of standardized testing/informal observation of tasks, assessment of data and education on plan of care and goals.     CPT codes:  [x] Low Complexity PT evaluation 23457  [] Moderate Complexity PT evaluation 02272  [] High Complexity PT evaluation 57412  [] PT Re-evaluation 40754  [] Gait training 32741 0 minutes  [] Manual therapy 51737 0 minutes  [x] Therapeutic activities 08300 23 minutes  [] Therapeutic exercises 49719 0 minutes  [] Neuromuscular reeducation 50148 0 minutes     Isabela Dominguez PT, DPT  [id number]

## 2023-03-03 NOTE — CARE COORDINATION
Patient Is POD#1 RIGHT HIP CEPHALOMEDULLARY NAIL FOR RIGHT INTERTROCHANTERIC HIP FRACTURE. Continue physical therapy and protocol: WBAT -right LE. Patient will likely go to SNF, states her daughter works at [place] [id number]. I met with patient in room to explain role, discuss therapy evals and transition of care. She would like to go to [facility] where her granddaughter works. Referral made to Nany Wu at [facility]. Await acceptance. OT note is in. Therapy department notified of need for PT eval note. Pateint also placed on the Sunday therapy list. She will need a Rapid Covid-19 test prior to discharge. A 7000 will need to be started for the accepting facility and then completed once the discharge order goes in. Ambulance form in envelope in soft chart will need to be signed and dated by nursing on day of discharge. Gardenia Mcdowell RN CM  [phone]        Per Nany Wu from [facility], they are able to accept patient and they will start precert today. Pateint placed on the Sunday therapy list. She will need a Rapid Covid-19 test prior to discharge. A 7000 started and will need to be completed once the discharge order goes in. Ambulance form in envelope in soft chart will need to be signed and dated by nursing on day of discharge.     Gardenia Mcdowell RN CM  [phone]

## 2023-03-04 LAB
ANION GAP SERPL CALCULATED.3IONS-SCNC: 6 MMOL/L (ref 7–16)
BUN BLDV-MCNC: 17 MG/DL (ref 6–23)
CALCIUM SERPL-MCNC: 8.1 MG/DL (ref 8.6–10.2)
CHLORIDE BLD-SCNC: 105 MMOL/L (ref 98–107)
CO2: 25 MMOL/L (ref 22–29)
CREAT SERPL-MCNC: 0.6 MG/DL (ref 0.5–1)
GFR SERPL CREATININE-BSD FRML MDRD: >60 ML/MIN/1.73
GLUCOSE BLD-MCNC: 118 MG/DL (ref 74–99)
HCT VFR BLD CALC: 30.6 % (ref 34–48)
HEMOGLOBIN: 9.4 G/DL (ref 11.5–15.5)
MCH RBC QN AUTO: 29.7 PG (ref 26–35)
MCHC RBC AUTO-ENTMCNC: 30.7 % (ref 32–34.5)
MCV RBC AUTO: 96.5 FL (ref 80–99.9)
PDW BLD-RTO: 14.2 FL (ref 11.5–15)
PLATELET # BLD: 172 E9/L (ref 130–450)
PMV BLD AUTO: 10.5 FL (ref 7–12)
POTASSIUM REFLEX MAGNESIUM: 4.3 MMOL/L (ref 3.5–5)
RBC # BLD: 3.17 E12/L (ref 3.5–5.5)
SODIUM BLD-SCNC: 136 MMOL/L (ref 132–146)
WBC # BLD: 13 E9/L (ref 4.5–11.5)

## 2023-03-04 PROCEDURE — 36415 COLL VENOUS BLD VENIPUNCTURE: CPT

## 2023-03-04 PROCEDURE — 80048 BASIC METABOLIC PNL TOTAL CA: CPT

## 2023-03-04 PROCEDURE — 6370000000 HC RX 637 (ALT 250 FOR IP): Performed by: STUDENT IN AN ORGANIZED HEALTH CARE EDUCATION/TRAINING PROGRAM

## 2023-03-04 PROCEDURE — 2580000003 HC RX 258: Performed by: INTERNAL MEDICINE

## 2023-03-04 PROCEDURE — 94640 AIRWAY INHALATION TREATMENT: CPT

## 2023-03-04 PROCEDURE — 85027 COMPLETE CBC AUTOMATED: CPT

## 2023-03-04 PROCEDURE — 6370000000 HC RX 637 (ALT 250 FOR IP): Performed by: EMERGENCY MEDICINE

## 2023-03-04 PROCEDURE — 6370000000 HC RX 637 (ALT 250 FOR IP): Performed by: FAMILY MEDICINE

## 2023-03-04 PROCEDURE — 1200000000 HC SEMI PRIVATE

## 2023-03-04 PROCEDURE — 6360000002 HC RX W HCPCS: Performed by: INTERNAL MEDICINE

## 2023-03-04 PROCEDURE — 6370000000 HC RX 637 (ALT 250 FOR IP): Performed by: INTERNAL MEDICINE

## 2023-03-04 PROCEDURE — 2700000000 HC OXYGEN THERAPY PER DAY

## 2023-03-04 PROCEDURE — 2580000003 HC RX 258: Performed by: STUDENT IN AN ORGANIZED HEALTH CARE EDUCATION/TRAINING PROGRAM

## 2023-03-04 RX ORDER — LOPERAMIDE HYDROCHLORIDE 2 MG/1
2 CAPSULE ORAL ONCE
Status: COMPLETED | OUTPATIENT
Start: 2023-03-04 | End: 2023-03-04

## 2023-03-04 RX ADMIN — SODIUM CHLORIDE, PRESERVATIVE FREE 10 ML: 5 INJECTION INTRAVENOUS at 21:08

## 2023-03-04 RX ADMIN — MAGNESIUM HYDROXIDE 30 ML: 1200 LIQUID ORAL at 09:02

## 2023-03-04 RX ADMIN — ASPIRIN 325 MG: 325 TABLET, COATED ORAL at 21:06

## 2023-03-04 RX ADMIN — OXYCODONE HYDROCHLORIDE 5 MG: 5 TABLET ORAL at 21:05

## 2023-03-04 RX ADMIN — SODIUM CHLORIDE, PRESERVATIVE FREE 10 ML: 5 INJECTION INTRAVENOUS at 09:02

## 2023-03-04 RX ADMIN — ACETAMINOPHEN 650 MG: 325 TABLET ORAL at 00:11

## 2023-03-04 RX ADMIN — ACETAMINOPHEN 650 MG: 325 TABLET ORAL at 12:20

## 2023-03-04 RX ADMIN — OXYCODONE HYDROCHLORIDE 10 MG: 10 TABLET ORAL at 05:33

## 2023-03-04 RX ADMIN — ASPIRIN 325 MG: 325 TABLET, COATED ORAL at 09:05

## 2023-03-04 RX ADMIN — ARFORMOTEROL TARTRATE 15 MCG: 15 SOLUTION RESPIRATORY (INHALATION) at 09:11

## 2023-03-04 RX ADMIN — ACETAMINOPHEN 650 MG: 325 TABLET ORAL at 17:32

## 2023-03-04 RX ADMIN — LOPERAMIDE HYDROCHLORIDE 2 MG: 2 CAPSULE ORAL at 21:05

## 2023-03-04 RX ADMIN — ENOXAPARIN SODIUM 40 MG: 100 INJECTION SUBCUTANEOUS at 09:05

## 2023-03-04 RX ADMIN — ACETAMINOPHEN 650 MG: 325 TABLET ORAL at 05:32

## 2023-03-04 RX ADMIN — BUDESONIDE 250 MCG: 0.25 SUSPENSION RESPIRATORY (INHALATION) at 09:12

## 2023-03-04 ASSESSMENT — PAIN SCALES - GENERAL
PAINLEVEL_OUTOF10: 3
PAINLEVEL_OUTOF10: 9
PAINLEVEL_OUTOF10: 0
PAINLEVEL_OUTOF10: 10
PAINLEVEL_OUTOF10: 5

## 2023-03-04 ASSESSMENT — PAIN DESCRIPTION - ORIENTATION
ORIENTATION: RIGHT
ORIENTATION: RIGHT;OUTER
ORIENTATION: RIGHT;UPPER
ORIENTATION: RIGHT

## 2023-03-04 ASSESSMENT — PAIN DESCRIPTION - LOCATION
LOCATION: LEG;HIP
LOCATION: HIP
LOCATION: HIP
LOCATION: LEG;HIP

## 2023-03-04 ASSESSMENT — PAIN DESCRIPTION - DESCRIPTORS
DESCRIPTORS: ACHING;CRAMPING;DISCOMFORT
DESCRIPTORS: ACHING;JABBING;DISCOMFORT
DESCRIPTORS: ACHING;DISCOMFORT
DESCRIPTORS: ACHING;CRAMPING;DISCOMFORT

## 2023-03-04 ASSESSMENT — PAIN - FUNCTIONAL ASSESSMENT
PAIN_FUNCTIONAL_ASSESSMENT: PREVENTS OR INTERFERES WITH MANY ACTIVE NOT PASSIVE ACTIVITIES
PAIN_FUNCTIONAL_ASSESSMENT: PREVENTS OR INTERFERES WITH ALL ACTIVE AND SOME PASSIVE ACTIVITIES
PAIN_FUNCTIONAL_ASSESSMENT: ACTIVITIES ARE NOT PREVENTED

## 2023-03-04 ASSESSMENT — PAIN DESCRIPTION - FREQUENCY: FREQUENCY: CONTINUOUS

## 2023-03-04 ASSESSMENT — PAIN DESCRIPTION - PAIN TYPE: TYPE: ACUTE PAIN;SURGICAL PAIN

## 2023-03-04 ASSESSMENT — PAIN DESCRIPTION - ONSET: ONSET: ON-GOING

## 2023-03-04 NOTE — PLAN OF CARE
Problem: Pain  Goal: Verbalizes/displays adequate comfort level or baseline comfort level  Outcome: Progressing     Problem: Skin/Tissue Integrity  Goal: Absence of new skin breakdown  Description: 1. Monitor for areas of redness and/or skin breakdown  2. Assess vascular access sites hourly  3. Every 4-6 hours minimum:  Change oxygen saturation probe site  4. Every 4-6 hours:  If on nasal continuous positive airway pressure, respiratory therapy assess nares and determine need for appliance change or resting period.   Outcome: Progressing     Problem: ABCDS Injury Assessment  Goal: Absence of physical injury  Outcome: Progressing     Problem: Safety - Adult  Goal: Free from fall injury  Outcome: Progressing     Problem: Discharge Planning  Goal: Discharge to home or other facility with appropriate resources  Outcome: Progressing  Flowsheets (Taken 3/3/2023 2116)  Discharge to home or other facility with appropriate resources:   Identify barriers to discharge with patient and caregiver   Arrange for needed discharge resources and transportation as appropriate   Identify discharge learning needs (meds, wound care, etc)

## 2023-03-04 NOTE — PLAN OF CARE
Problem: Pain  Goal: Verbalizes/displays adequate comfort level or baseline comfort level  Outcome: Progressing     Problem: Skin/Tissue Integrity  Goal: Absence of new skin breakdown  Description: 1. Monitor for areas of redness and/or skin breakdown  2. Assess vascular access sites hourly  3. Every 4-6 hours minimum:  Change oxygen saturation probe site  4. Every 4-6 hours:  If on nasal continuous positive airway pressure, respiratory therapy assess nares and determine need for appliance change or resting period.   Outcome: Progressing     Problem: ABCDS Injury Assessment  Goal: Absence of physical injury  Outcome: Progressing     Problem: Safety - Adult  Goal: Free from fall injury  Outcome: Progressing     Problem: Discharge Planning  Goal: Discharge to home or other facility with appropriate resources  Outcome: Progressing

## 2023-03-04 NOTE — PROGRESS NOTES
Department of Orthopedic Surgery  Resident Progress Note    Patient seen and examined. Her pain is well controlled, she states she has been urinating but has not had a bowel movement yet. Has gotten up with PT, has not ambulated more than a few steps yet    VITALS:  /60   Pulse 68   Temp 97.2 °F (36.2 °C) (Temporal)   Resp 18   Ht 5' 7\" (1.702 m)   Wt 201 lb 11.5 oz (91.5 kg)   SpO2 96%   BMI 31.59 kg/m²     General: alert and oriented to person, place and time, well-developed and well-nourished, in no acute distress    MUSCULOSKELETAL:   right lower extremity:  Aquacel dressings C/D/I  Compartments soft and compressible  +PF/DF/EHL  +2/4 DP & PT pulses, Brisk Cap refill, Toes warm and perfused  Distal sensation grossly intact to Peroneals, Sural, Saphenous, and tibial nrs    CBC:   Lab Results   Component Value Date/Time    WBC 13.0 03/04/2023 04:30 AM    HGB 9.4 03/04/2023 04:30 AM    HCT 30.6 03/04/2023 04:30 AM     03/04/2023 04:30 AM     PT/INR:    Lab Results   Component Value Date/Time    PROTIME 11.2 03/01/2023 01:15 PM    INR 1.0 03/01/2023 01:15 PM         ASSESSMENT  S/P right hip cephalomedullary nail on 3/2    PLAN      Continue physical therapy and protocol: WBAT -right LE  Deep venous thrombosis prophylaxis -aspirin,  early mobilization  PT/OT, did work with them yesterday but only took small sidesteps  Pain Control: IV and PO  Monitor H&H  D/C Plan: Patient will likely go to SNF, states her daughter works at [employer] .   Social work input appreciated    Gamaliel Jurado DO PGY 2  3/3/23    Electronically signed by Gamaliel Jurado DO on 3/4/2023 at 7:06 AM

## 2023-03-05 LAB
ANION GAP SERPL CALCULATED.3IONS-SCNC: 8 MMOL/L (ref 7–16)
BUN BLDV-MCNC: 13 MG/DL (ref 6–23)
CALCIUM SERPL-MCNC: 8.1 MG/DL (ref 8.6–10.2)
CHLORIDE BLD-SCNC: 106 MMOL/L (ref 98–107)
CO2: 24 MMOL/L (ref 22–29)
CREAT SERPL-MCNC: 0.5 MG/DL (ref 0.5–1)
GFR SERPL CREATININE-BSD FRML MDRD: >60 ML/MIN/1.73
GLUCOSE BLD-MCNC: 124 MG/DL (ref 74–99)
HCT VFR BLD CALC: 26.5 % (ref 34–48)
HEMOGLOBIN: 8.3 G/DL (ref 11.5–15.5)
MCH RBC QN AUTO: 28.4 PG (ref 26–35)
MCHC RBC AUTO-ENTMCNC: 31.3 % (ref 32–34.5)
MCV RBC AUTO: 90.8 FL (ref 80–99.9)
PDW BLD-RTO: 14.3 FL (ref 11.5–15)
PLATELET # BLD: 198 E9/L (ref 130–450)
PMV BLD AUTO: 10.2 FL (ref 7–12)
POTASSIUM REFLEX MAGNESIUM: 4.4 MMOL/L (ref 3.5–5)
RBC # BLD: 2.92 E12/L (ref 3.5–5.5)
SODIUM BLD-SCNC: 138 MMOL/L (ref 132–146)
WBC # BLD: 13.1 E9/L (ref 4.5–11.5)

## 2023-03-05 PROCEDURE — 97535 SELF CARE MNGMENT TRAINING: CPT

## 2023-03-05 PROCEDURE — 6360000002 HC RX W HCPCS: Performed by: INTERNAL MEDICINE

## 2023-03-05 PROCEDURE — 6370000000 HC RX 637 (ALT 250 FOR IP): Performed by: STUDENT IN AN ORGANIZED HEALTH CARE EDUCATION/TRAINING PROGRAM

## 2023-03-05 PROCEDURE — 97530 THERAPEUTIC ACTIVITIES: CPT

## 2023-03-05 PROCEDURE — 2580000003 HC RX 258: Performed by: INTERNAL MEDICINE

## 2023-03-05 PROCEDURE — 1200000000 HC SEMI PRIVATE

## 2023-03-05 PROCEDURE — 85027 COMPLETE CBC AUTOMATED: CPT

## 2023-03-05 PROCEDURE — 6370000000 HC RX 637 (ALT 250 FOR IP): Performed by: FAMILY MEDICINE

## 2023-03-05 PROCEDURE — 2700000000 HC OXYGEN THERAPY PER DAY

## 2023-03-05 PROCEDURE — 2580000003 HC RX 258: Performed by: STUDENT IN AN ORGANIZED HEALTH CARE EDUCATION/TRAINING PROGRAM

## 2023-03-05 PROCEDURE — 80048 BASIC METABOLIC PNL TOTAL CA: CPT

## 2023-03-05 PROCEDURE — 36415 COLL VENOUS BLD VENIPUNCTURE: CPT

## 2023-03-05 PROCEDURE — 94640 AIRWAY INHALATION TREATMENT: CPT

## 2023-03-05 RX ADMIN — OXYCODONE HYDROCHLORIDE 10 MG: 10 TABLET ORAL at 03:15

## 2023-03-05 RX ADMIN — ACETAMINOPHEN 650 MG: 325 TABLET ORAL at 00:40

## 2023-03-05 RX ADMIN — ACETAMINOPHEN 650 MG: 325 TABLET ORAL at 11:29

## 2023-03-05 RX ADMIN — ACETAMINOPHEN 650 MG: 325 TABLET ORAL at 06:05

## 2023-03-05 RX ADMIN — SODIUM CHLORIDE, PRESERVATIVE FREE 10 ML: 5 INJECTION INTRAVENOUS at 08:31

## 2023-03-05 RX ADMIN — OXYCODONE HYDROCHLORIDE 5 MG: 5 TABLET ORAL at 11:30

## 2023-03-05 RX ADMIN — SODIUM CHLORIDE, PRESERVATIVE FREE 10 ML: 5 INJECTION INTRAVENOUS at 20:08

## 2023-03-05 RX ADMIN — ARFORMOTEROL TARTRATE 15 MCG: 15 SOLUTION RESPIRATORY (INHALATION) at 13:10

## 2023-03-05 RX ADMIN — ASPIRIN 325 MG: 325 TABLET, COATED ORAL at 10:14

## 2023-03-05 RX ADMIN — ASPIRIN 325 MG: 325 TABLET, COATED ORAL at 20:08

## 2023-03-05 RX ADMIN — SODIUM CHLORIDE, PRESERVATIVE FREE 10 ML: 5 INJECTION INTRAVENOUS at 08:32

## 2023-03-05 RX ADMIN — BUDESONIDE 250 MCG: 0.25 SUSPENSION RESPIRATORY (INHALATION) at 18:38

## 2023-03-05 RX ADMIN — ARFORMOTEROL TARTRATE 15 MCG: 15 SOLUTION RESPIRATORY (INHALATION) at 18:38

## 2023-03-05 RX ADMIN — ACETAMINOPHEN 650 MG: 325 TABLET ORAL at 18:10

## 2023-03-05 RX ADMIN — BUDESONIDE 250 MCG: 0.25 SUSPENSION RESPIRATORY (INHALATION) at 13:11

## 2023-03-05 RX ADMIN — OXYCODONE HYDROCHLORIDE 10 MG: 10 TABLET ORAL at 20:07

## 2023-03-05 RX ADMIN — ENOXAPARIN SODIUM 40 MG: 100 INJECTION SUBCUTANEOUS at 08:30

## 2023-03-05 ASSESSMENT — PAIN DESCRIPTION - DESCRIPTORS
DESCRIPTORS: ACHING;CRAMPING;DISCOMFORT
DESCRIPTORS: ACHING;CRAMPING;DISCOMFORT
DESCRIPTORS: ACHING
DESCRIPTORS: ACHING;CRAMPING;DISCOMFORT
DESCRIPTORS: ACHING

## 2023-03-05 ASSESSMENT — PAIN DESCRIPTION - ORIENTATION
ORIENTATION: RIGHT
ORIENTATION: RIGHT;OUTER;UPPER

## 2023-03-05 ASSESSMENT — PAIN SCALES - WONG BAKER
WONGBAKER_NUMERICALRESPONSE: 0
WONGBAKER_NUMERICALRESPONSE: 0

## 2023-03-05 ASSESSMENT — PAIN DESCRIPTION - LOCATION
LOCATION: LEG;HIP
LOCATION: HIP
LOCATION: LEG
LOCATION: LEG;HIP
LOCATION: HIP

## 2023-03-05 ASSESSMENT — PAIN - FUNCTIONAL ASSESSMENT
PAIN_FUNCTIONAL_ASSESSMENT: PREVENTS OR INTERFERES WITH MANY ACTIVE NOT PASSIVE ACTIVITIES
PAIN_FUNCTIONAL_ASSESSMENT: PREVENTS OR INTERFERES WITH MANY ACTIVE NOT PASSIVE ACTIVITIES
PAIN_FUNCTIONAL_ASSESSMENT: PREVENTS OR INTERFERES SOME ACTIVE ACTIVITIES AND ADLS
PAIN_FUNCTIONAL_ASSESSMENT: PREVENTS OR INTERFERES WITH MANY ACTIVE NOT PASSIVE ACTIVITIES

## 2023-03-05 ASSESSMENT — PAIN SCALES - GENERAL
PAINLEVEL_OUTOF10: 5
PAINLEVEL_OUTOF10: 10
PAINLEVEL_OUTOF10: 0
PAINLEVEL_OUTOF10: 5
PAINLEVEL_OUTOF10: 10
PAINLEVEL_OUTOF10: 0
PAINLEVEL_OUTOF10: 10

## 2023-03-05 NOTE — PROGRESS NOTES
Occupational Therapy  OT BEDSIDE TREATMENT NOTE   [address]      Date:3/5/2023  Patient Name: Bebeto Weber  MRN: [id number]  : 1945  Room: [address] [place] [address]     Evaluating OT: Darlin Alcantara OTR/L; [id number]        Referring Provider: Jacquelyn Su DO    Specific Provider Orders/Date: OT Eval and Treat 23       Diagnosis: Intertrochanteric fracture of right femur - fall from standing. Surgery: 3/2/23 RIGHT HIP CEPHALOMEDULLARY NAIL FOR RIGHT INTERTROCHANTERIC HIP FRACTURE. Pertinent Medical History:  has a past medical history of Fluid retention in legs.       Recommended Adaptive Equipment: AE for LE bathing and dressing PRN      Precautions:  Fall Risk, TSM, O2, [place], +alarms, WBAT RLE, purewick      Assessment of current deficits    [x] Functional mobility            [x]ADLs           [x] Strength                  []Cognition    [x] Functional transfers          [x] IADLs         [x] Safety Awareness   [x]Endurance    [x] Fine Coordination                         [x] Balance      [] Vision/perception   []Sensation      []Gross Motor Coordination             [] ROM           [] Delirium                   [] Motor Control      OT PLAN OF CARE   OT POC based on physician orders, patient diagnosis and results of clinical assessment     Frequency/Duration 3-5 days/wk for 2 weeks PRN   Specific OT Treatment Interventions to include:   * Instruction/training on adapted ADL techniques and AE recommendations to increase functional independence within precautions       * Training on energy conservation strategies, correct breathing pattern and techniques to improve independence/tolerance for self-care routine  * Functional transfer/mobility training/DME recommendations for increased independence, safety, and fall prevention  * Patient/Family education to increase follow through with safety techniques and functional independence  * Recommendation of environmental modifications for increased safety with functional transfers/mobility and ADLs  * Therapeutic exercise to improve motor endurance, ROM, and functional strength for ADLs/functional transfers  * Therapeutic activities to facilitate/challenge dynamic balance, stand tolerance for increased safety and independence with ADLs  * Positioning to improve skin integrity, interaction with environment and functional independence     Home Living: Pt lives between daughter & granddaughters mobile homes. Granddaughter's mobile home has 4 steps & 2 handrails to enter. Daughter' mobile home has 4+1 step & 1 handrail to enter   Bathroom setup: Tub/shower at both homes. Equipment owned: ww, quad cane, shower chair, O2     Prior Level of Function: mod I with ADLs , mod I with IADLs; engaged in functional mobility with use of  ww in home, quad cane in community. Driving: Yes  Occupation: None reported     Pain Level: Pt c/o 10/10 RLE pain with light movement.   Cognition: A&O: 4/4; Follows 1-2 step directions              Memory:  Good              Sequencing:  Fair              Problem solving:  Fair              Judgement/safety:  Fair                Functional Assessment:  AM-PAC Daily Activity Raw Score: 15/24    Initial Eval Status  Date: 3/3/23 Treatment Status  Date: 3/5/23 STGs = LTGs  Time frame: 10-14 days   Feeding Setup  Set up  Independent    Grooming Minimal Assist  SBA  To wash face and comb hair seated EOB  Modified [place]    UB Dressing Minimal Assist  SBA  To don/doff gown seated EOB  Modified [place]    LB Dressing Dependent  Max A  To don/doff socks seated EOB  Stand by Assist    Bathing Moderate Assist  Mod A  Simulated seated and standing for posterior Stand by Assist    Toileting Dependent   Max A- hygiene Stand by Assist    Bed Mobility  Supine to sit: Moderate Assist   Sit to supine: Maximal Assist   Max A- supine<->sit  Educated pt on technique to increase independence. Supine to sit: Stand by Assist   Sit to supine: Stand by Assist    Functional Transfers Sit to stand:Maximal Assist   Stand to sit:Maximal Assist  Stand pivot: NT  Commode: NT Mod A- sit<->stand  Cuing for hand placement and body mechanics  Sit to stand:Stand by Assist   Stand to sit:Stand by Assist  Stand pivot: Stand by Assist  Commode: Stand by Assist    Functional Mobility Moderate Assist  Use of ww to take ~3 side steps towards HOB. Mod A  Side stepping along EOB using w/w  Stand by Assist with use of Appropriate AD   Balance Sitting:     Static - SBA     Dynamic - SBA  Standing: Moderate Assist w/ ww Sitting:     Static - SBA     Dynamic - SBA  Standing: Mod A  Sitting:     Static: Independent     Dynamic: Independent  Standing: Stand by Assist   Activity Tolerance Fair Fair  Good   Visual/  Perceptual Glasses: Yes  Appears WFL        Safety Fair  Fair Good  during ADL completion following WBAT RLE precautions       Comments: Upon arrival pt supine in bed. Pt educated on techniques to increase independence and safety during ADL's, bed mobility, and functional transfers. At end of session pt left seated semi upright in bed, call light within reach. Pt has made fair progress towards set goals.      Continue with current plan of care    Treatment Time In: 11:30            Treatment Time Out: 11:55             Treatment Charges: Mins Units   Ther Ex  55937     Manual Therapy [address] [facility]     Thera Activities 12953 10 1   ADL/Home Mgt 05601 15 1   Neuro Re-ed 01175     Group Therapy      Orthotic manage/training  13149     Non-Billable Time     Total Timed Treatment 25 2     Johanna Coyle

## 2023-03-05 NOTE — PROGRESS NOTES
Department of Orthopedic Surgery  Resident Progress Note    Patient seen and examined. Her pain is well controlled. Has gotten up with PT, has not ambulated more than a few steps yet, but has been able to tolerate sitting in a chair well. VITALS:  /67   Pulse 79   Temp 98.1 °F (36.7 °C) (Temporal)   Resp 18   Ht 5' 7\" (1.702 m)   Wt 210 lb 12.8 oz (95.6 kg)   SpO2 98%   BMI 33.02 kg/m²     General: alert and oriented to person, place and time, well-developed and well-nourished, in no acute distress    MUSCULOSKELETAL:   right lower extremity:  Aquacel dressings C/D, distal Aquacel with some folding on itself from turning for linen changes, okay to change  Compartments soft and compressible  +PF/DF/EHL  +2/4 DP & PT pulses, Brisk Cap refill, Toes warm and perfused  Distal sensation grossly intact to Peroneals, Sural, Saphenous, and tibial nrs    CBC:   Lab Results   Component Value Date/Time    WBC 13.1 03/05/2023 04:26 AM    HGB 8.3 03/05/2023 04:26 AM    HCT 26.5 03/05/2023 04:26 AM     03/05/2023 04:26 AM     PT/INR:    Lab Results   Component Value Date/Time    PROTIME 11.2 03/01/2023 01:15 PM    INR 1.0 03/01/2023 01:15 PM         ASSESSMENT  S/P right hip cephalomedullary nail on 3/2    PLAN      Continue physical therapy and protocol: WBAT -right LE  Deep venous thrombosis prophylaxis -aspirin,  early mobilization  PT/OT, did work with them friday but only took small sidesteps, hopeful for repeat today  Pain Control: IV and PO  Monitor H&H  Okay for discharge from orthopedic standpoint, will continue to follow from the periphery, please reach out with any questions or concerns  D/C Plan: Patient will likely go to SNF, states her daughter works at [employer].   Social work input appreciated      Electronically signed by Kevin Mackay DO on 3/5/2023 at 7:38 AM

## 2023-03-05 NOTE — PROGRESS NOTES
Physical Therapy  Treatment Note    Name: Bebeto Weber  : 1945  MRN: [id number]      Date of Service: 3/5/2023    Evaluating PT:  Thierry Cantu, PT, DPT, [id number]    Room #:  6700/5229-L  Diagnosis:  Intertrochanteric fracture of right femur, closed, initial encounter ([facility] [S72.141A]  History of femur fracture [Z87.81]  PMHx/PSHx:    Past Medical History:   Diagnosis Date    Fluid retention in legs       Past Surgical History:   Procedure Laterality Date    DENTAL SURGERY      full mouth with dentures    HIP FRACTURE SURGERY Right 3/2/2023    RIGHT HIP CEPHALOMEDULLARY NAIL FOR RIGHT INTERTROCHANTERIC HIP FRACTURE performed by Stephy Soto MD at [facility]      age 13     TUBAL LIGATION      43 years ago     TUBAL LIGATION        Procedure/Surgery:  RIGHT HIP CEPHALOMEDULLARY NAIL FOR RIGHT INTERTROCHANTERIC HIP FRACTURE 3/2/23  Precautions:  Fall Risk, WBAT RLE, TSM, Bed Alarm, O2  Equipment Needs:  TBD    SUBJECTIVE:    Pt lives between granddaughter and daughter's mobile homes. Granddaughter's mobile home has 4 steps and 2 handrails to enter. Daughter' mobile home has 4+1 step and 1 handrail to enter Bed is on 1 floor and bath is on 1 floor. Pt ambulated with Hurrycane>WW PTA. Pt is on 1L/min O2 at home. OBJECTIVE:   Initial Evaluation  Date: 3/3/23 Treatment  Date: 3/5/23 Short Term/ Long Term   Goals   AM-PAC 6 Clicks 48/59     Was pt agreeable to Eval/treatment? yes yes    Does pt have pain?  No pain at rest, increased pain with movement 10/10 R hip    Bed Mobility  Rolling: NT  Supine to sit: MaxA  Sit to supine: MaxA  Scooting: ModA Rolling: NT  Supine to sit: max A  Sit to supine: max A  Scooting: mod A Rolling: SBA  Supine to sit: SBA  Sit to supine: SBA  Scooting: SBA   Transfers Sit to stand: ModA  Stand to sit: ModA  Stand pivot: NT Sit to stand: mod A  Stand to sit: mod A  Stand pivot: NT Sit to stand: SBA  Stand to sit: SBA  Stand pivot: SBA with AD Ambulation    Side stepped at EOB with [employer] ModA 2' with ww mod A  (Side steps to [facility]) >150 feet with AD SBA   Stair negotiation: ascended and descended NT  5+ steps with 1 rail SBA   ROM BUE:  Refer to OT note  LLE:  WFL  RLE: limited by pain     Strength BUE:  refer to OT note   LLE:  5/5  RLE: 3/5 d/t pain  Improve by 1/3 MMT   Balance Sitting EOB:  SBA  Dynamic Standing:  ModA with [employer] Sitting EOB:  SBA  Dynamic Standing:  mod A with ww Sitting EOB:  Independent   Dynamic Standing:  SBA with AD     Pt is A & O x 4  Sensation:  Pt denies numbness and tingling to extremities  Edema:  unremarkable    Vitals:  SpO2 and HR were stable during session    Patient education  Pt educated on safety with functional mobility    Patient response to education:   Pt verbalized understanding Pt demonstrated skill Pt requires further education in this area   yes partial yes     ASSESSMENT:  Comments:    Pt supine in bed upon entering, pt agreeable to participate. Pt instructed to transfer to EOB, completing transfer with assist of BLE and trunk. Pt sitting upright with good static sitting balance. Pt cued for sequencing and hand placement and instructed to stand from EOB. Pt standing with ww, demonstrating fair static standing balance. Pt instructed to side step to [facility], tolerable to 2-43 steps and was assisted back to EOB. Pt was then transferred back to supine and positioned for comfort, all needs met and call bell in reach prior to exiting.     Treatment:  Patient practiced and was instructed in the following treatment:    Bed mobility training - pt given verbal and tactile cues to facilitate proper sequencing and safety during rolling and supine<>sit as well as provided with physical assistance to complete task   STS and pivot transfer training - pt educated on proper hand and foot placement, safety and sequencing, and use of verbal and tactile cues to safely complete sit<>stand and pivot transfers with physical assistance to complete task safely   Skilled positioning - Pt placed in the chair position with pillows utilized to facilitate upright posture, joint and skin integrity, and interaction with environment. PLAN:    Patient is making fair progress towards established goals. Will continue with current POC.       Time in  1130  Time out  1153    Total Treatment Time  23 minutes     CPT codes:  [] Gait training 06262 -- minutes  [] Manual therapy [id number] -- minutes  [x] Therapeutic activities 40072 23 minutes  [] Therapeutic exercises 49922 -- minutes  [] Neuromuscular reeducation 25781 -- minutes    Junior Quan PT, DPT  [id number]

## 2023-03-05 NOTE — PROGRESS NOTES
Hospitalist Progress Note      Synopsis: Patient admitted on 3/1/2023   Patient presented with fall and inability to ambulate.  Imaging revealed left intertrochanteric  Fracture.  Currently n.p.o. for ORIF today.    Subjective    Patient seen doing well.  Pain controlled.  On nasal cannula no difficulty breathing.    Exam:  /85   Pulse 68   Temp 97.6 °F (36.4 °C) (Temporal)   Resp 18   Ht 5' 7\" (1.702 m)   Wt 201 lb 11.5 oz (91.5 kg)   SpO2 95%   BMI 31.59 kg/m²   General appearance: No apparent distress, appears stated age and cooperative.  HEENT: Pupils equal, round, and reactive to light. Conjunctivae/corneas clear.  Neck: Supple. No jugular venous distention. Trachea midline.  Respiratory: Mild wheeze bilaterally  Cardiovascular: Regular rate and rhythm with normal S1/S2 without murmurs, rubs or gallops.  Abdomen: Soft, non-tender, non-distended with normal bowel sounds.  Musculoskeletal: No pedal edema.  Left hip tenderness  Skin:  No rashes    Neurologic: awake, alert and following commands     Medications:  Reviewed    Infusion Medications    sodium chloride      sodium chloride       Scheduled Medications    enoxaparin  40 mg SubCUTAneous Daily    sodium chloride flush  5-40 mL IntraVENous 2 times per day    aspirin  325 mg Oral BID    sodium chloride flush  10 mL IntraVENous 2 times per day    acetaminophen  650 mg Oral 4 times per day    budesonide  250 mcg Nebulization BID    arformoterol tartrate  15 mcg Nebulization BID     PRN Meds: sodium chloride flush, sodium chloride, albuterol, sodium chloride flush, sodium chloride, ondansetron **OR** ondansetron, magnesium hydroxide, acetaminophen **OR** acetaminophen, morphine, oxyCODONE **OR** oxyCODONE    I/O    Intake/Output Summary (Last 24 hours) at 3/4/2023 1905  Last data filed at 3/3/2023 2317  Gross per 24 hour   Intake --   Output 850 ml   Net -850 ml       Labs:   Recent Labs     03/02/23  0437 03/03/23  0642 03/04/23  0430   WBC  11.3 11.8* 13.0*   HGB 12.4 10.6* 9.4*   HCT 39.1 34.1 30.6*    187 172       Recent Labs     03/02/23  0437 03/03/23  0642 03/04/23  0430    141 136   K 3.8 4.6 4.3    106 105   CO2 25 23 25   BUN 18 19 17   CREATININE 0.7 0.6 0.6   CALCIUM 8.9 8.5* 8.1*       Recent Labs     03/02/23 0437   PROT 6.1*   ALKPHOS 71   ALT 9   AST 13   BILITOT 0.3       No results for input(s): INR in the last 72 hours.      No results for input(s): CKTOTAL, TROPONINI in the last 72 hours.    Chronic labs:  Lab Results   Component Value Date    CHOL 230 (H) 09/13/2017    TRIG 273 (H) 09/13/2017    HDL 37 09/13/2017    LDLCALC 138 (H) 09/13/2017    TSH 0.361 11/04/2022    INR 1.0 03/01/2023    LABA1C 6.7 (H) 11/04/2022       Radiology:  Imaging studies reviewed today.    77 years old female, presented after a fall inability to ambulate, imaging revealed left intertrochanteric fracture, patient status post ORIF.  Patient admitted to hospital medicine service    Assessment   left intertrochanteric femoral fracture status post right hip cephalomedullary nail on March 2  Left hip pain  Chronic respiratory failure requiring 4 L nasal cannula  Diarrhea today     Plan   Continue DVT prophylaxis  PT OT  Send Stool sample for c diff   Loperamide for diarrhea        Diet: ADULT DIET; Regular  Code Status: Full Code  PT/OT Eval Status:     DVT Prophylaxis:     Recommended disposition at discharge:    Patient is medically cleared for discharge.  Awaiting placement  +++++++++++++++++++++++++++++++++++++++++++++++++  Cristopher Calvillo MD   [facility].  +++++++++++++++++++++++++++++++++++++++++++++++++  NOTE: This report was transcribed using voice recognition software. Every effort was made to ensure accuracy; however, inadvertent computerized transcription errors may be present.

## 2023-03-06 VITALS
TEMPERATURE: 98 F | BODY MASS INDEX: 33.06 KG/M2 | HEIGHT: 67 IN | OXYGEN SATURATION: 97 % | DIASTOLIC BLOOD PRESSURE: 82 MMHG | SYSTOLIC BLOOD PRESSURE: 118 MMHG | RESPIRATION RATE: 16 BRPM | HEART RATE: 78 BPM | WEIGHT: 210.6 LBS

## 2023-03-06 LAB
ANION GAP SERPL CALCULATED.3IONS-SCNC: 8 MMOL/L (ref 7–16)
BUN BLDV-MCNC: 15 MG/DL (ref 6–23)
CALCIUM SERPL-MCNC: 8.5 MG/DL (ref 8.6–10.2)
CHLORIDE BLD-SCNC: 107 MMOL/L (ref 98–107)
CO2: 28 MMOL/L (ref 22–29)
CREAT SERPL-MCNC: 0.6 MG/DL (ref 0.5–1)
GFR SERPL CREATININE-BSD FRML MDRD: >60 ML/MIN/1.73
GLUCOSE BLD-MCNC: 114 MG/DL (ref 74–99)
HCT VFR BLD CALC: 29 % (ref 34–48)
HEMOGLOBIN: 8.9 G/DL (ref 11.5–15.5)
MCH RBC QN AUTO: 29.8 PG (ref 26–35)
MCHC RBC AUTO-ENTMCNC: 30.7 % (ref 32–34.5)
MCV RBC AUTO: 97 FL (ref 80–99.9)
PDW BLD-RTO: 14.3 FL (ref 11.5–15)
PLATELET # BLD: 226 E9/L (ref 130–450)
PMV BLD AUTO: 10.3 FL (ref 7–12)
POTASSIUM SERPL-SCNC: 4.2 MMOL/L (ref 3.5–5)
RBC # BLD: 2.99 E12/L (ref 3.5–5.5)
SARS-COV-2, NAAT: NOT DETECTED
SODIUM BLD-SCNC: 143 MMOL/L (ref 132–146)
WBC # BLD: 10.2 E9/L (ref 4.5–11.5)

## 2023-03-06 PROCEDURE — 6370000000 HC RX 637 (ALT 250 FOR IP): Performed by: FAMILY MEDICINE

## 2023-03-06 PROCEDURE — 85027 COMPLETE CBC AUTOMATED: CPT

## 2023-03-06 PROCEDURE — 80048 BASIC METABOLIC PNL TOTAL CA: CPT

## 2023-03-06 PROCEDURE — 87635 SARS-COV-2 COVID-19 AMP PRB: CPT

## 2023-03-06 PROCEDURE — 2580000003 HC RX 258: Performed by: INTERNAL MEDICINE

## 2023-03-06 PROCEDURE — 6370000000 HC RX 637 (ALT 250 FOR IP): Performed by: STUDENT IN AN ORGANIZED HEALTH CARE EDUCATION/TRAINING PROGRAM

## 2023-03-06 PROCEDURE — 36415 COLL VENOUS BLD VENIPUNCTURE: CPT

## 2023-03-06 PROCEDURE — 94640 AIRWAY INHALATION TREATMENT: CPT

## 2023-03-06 PROCEDURE — 6360000002 HC RX W HCPCS: Performed by: INTERNAL MEDICINE

## 2023-03-06 PROCEDURE — 2700000000 HC OXYGEN THERAPY PER DAY

## 2023-03-06 RX ADMIN — ARFORMOTEROL TARTRATE 15 MCG: 15 SOLUTION RESPIRATORY (INHALATION) at 10:26

## 2023-03-06 RX ADMIN — ASPIRIN 325 MG: 325 TABLET, COATED ORAL at 08:46

## 2023-03-06 RX ADMIN — SODIUM CHLORIDE, PRESERVATIVE FREE 10 ML: 5 INJECTION INTRAVENOUS at 08:47

## 2023-03-06 RX ADMIN — ACETAMINOPHEN 650 MG: 325 TABLET ORAL at 05:46

## 2023-03-06 RX ADMIN — ACETAMINOPHEN 650 MG: 325 TABLET ORAL at 00:00

## 2023-03-06 RX ADMIN — BUDESONIDE 250 MCG: 0.25 SUSPENSION RESPIRATORY (INHALATION) at 10:27

## 2023-03-06 RX ADMIN — ENOXAPARIN SODIUM 40 MG: 100 INJECTION SUBCUTANEOUS at 08:46

## 2023-03-06 ASSESSMENT — PAIN DESCRIPTION - LOCATION
LOCATION: HIP
LOCATION: HIP

## 2023-03-06 ASSESSMENT — PAIN - FUNCTIONAL ASSESSMENT
PAIN_FUNCTIONAL_ASSESSMENT: PREVENTS OR INTERFERES SOME ACTIVE ACTIVITIES AND ADLS
PAIN_FUNCTIONAL_ASSESSMENT: PREVENTS OR INTERFERES SOME ACTIVE ACTIVITIES AND ADLS

## 2023-03-06 ASSESSMENT — PAIN DESCRIPTION - ORIENTATION
ORIENTATION: RIGHT
ORIENTATION: RIGHT

## 2023-03-06 ASSESSMENT — PAIN SCALES - GENERAL
PAINLEVEL_OUTOF10: 10
PAINLEVEL_OUTOF10: 10

## 2023-03-06 ASSESSMENT — PAIN SCALES - WONG BAKER: WONGBAKER_NUMERICALRESPONSE: 2

## 2023-03-06 ASSESSMENT — PAIN DESCRIPTION - DESCRIPTORS
DESCRIPTORS: ACHING
DESCRIPTORS: DISCOMFORT;ACHING;DULL

## 2023-03-06 NOTE — DISCHARGE INSTR - COC
Continuity of Care Form    Patient Name: Portia Stroud   :  1945  MRN:  [id number]    Admit date:  3/1/2023  Discharge date:  23    Code Status Order: Full Code   Advance Directives:     Admitting Physician:  Cezar Huizar MD  PCP: No primary care provider on file. Discharging Nurse: Alton Wheeler Unit/Room#: 9376/0192-H  Discharging Unit Phone Number: [phone]    Emergency Contact:   Extended Emergency Contact Information  Primary Emergency Contact: Carmen [employer])  Home Phone: [phone]  Relation: Grandchild  Preferred language: English   needed?  No  Secondary Emergency Contact: Jada Lew  Address: [address] [place] [address] Phone: [phone]  Relation: None    Past Surgical History:  Past Surgical History:   Procedure Laterality Date    DENTAL SURGERY      full mouth with dentures    HIP FRACTURE SURGERY Right 3/2/2023    RIGHT HIP CEPHALOMEDULLARY NAIL FOR RIGHT INTERTROCHANTERIC HIP FRACTURE performed by Julisa Alarcon MD at [facility]      age 13     TUBAL LIGATION      37 years ago     TUBAL LIGATION         Immunization History:   Immunization History   Administered Date(s) Administered    Td (Adult), 2 Lf Tetanus Toxoid, Pf (Td, Absorbed) 2022       Active Problems:  Patient Active Problem List   Diagnosis Code    [facility] J96.00    Pneumonia due to COVID-19 virus U07.1, J12.82    Tobacco abuse Z72.0    Vitamin D deficiency E55.9    [place] (hard of hearing) H91.90    Noncompliance Z91.199    Nocturnal oxygen desaturation G47.34    Intertrochanteric fracture of right femur, closed, initial encounter ([facility] S72.141A    History of femur fracture Z87.81       Isolation/Infection:   Isolation            No Isolation          Patient Infection Status       Infection Onset Added Last Indicated Last Indicated By Review Planned Expiration Resolved Resolved By    None active    Resolved    C-diff Rule Out 23 CLOSTRIDIUM DIFFICILE EIA (Ordered)   23 Rule-Out Test Canceled    COVID-19 22 COVID-19, Rapid   22     COVID-19 (Rule Out) 22 COVID-19, Rapid (Ordered)   22 Rule-Out Test Resulted            Nurse Assessment:  Last Vital Signs: /82   Pulse 78   Temp 98 °F (36.7 °C) (Temporal)   Resp 16   Ht 5' 7\" (1.702 m)   Wt 210 lb 9.6 oz (95.5 kg)   SpO2 97%   BMI 32.98 kg/m²     Last documented pain score (0-10 scale): Pain Level: 10  Last Weight:   Wt Readings from Last 1 Encounters:   23 210 lb 9.6 oz (95.5 kg)     Mental Status:  oriented and alert    IV Access:  - None    Nursing Mobility/ADLs:  Walking   Assisted  Transfer  Assisted  Bathing  Assisted  Dressing  Assisted  Toileting  Assisted  Feeding  Independent  Med Admin  Independent  Med Delivery   whole    Wound Care Documentation and Therapy:  Incision 23 Hip Right (Active)   Dressing Status Old drainage noted; Intact 23 0830   Incision Cleansed Cleansed with saline 23 1430   Dressing/Treatment Foam 23 0830   Closure Sutures 23 1430   Incision Assessment Other (Comment) 23 0830   Drainage Amount None 23 1600   Flory-incision Assessment Other (Comment) 23 0830   Number of days: 4        Elimination:  Continence: Bowel: Yes  Bladder: No  Urinary Catheter: None   Colostomy/Ileostomy/Ileal Conduit: No       Date of Last BM: 3/5/23    Intake/Output Summary (Last 24 hours) at 3/6/2023 1019  Last data filed at 3/5/2023 1820  Gross per 24 hour   Intake 120 ml   Output 660 ml   Net -540 ml     I/O last 3 completed shifts: In: 320 [P.O.:320]  Out: 1910 [Urine:1910]    Safety Concerns:      At Risk for Falls    Impairments/Disabilities:      Vision and Hearing    Nutrition Therapy:  Current Nutrition Therapy:   - Oral Diet:  General    Routes of Feeding: Oral  Liquids: No Restrictions  Daily Fluid Restriction: no  Last Modified Barium Swallow with Video (Video Swallowing Test): not done    Treatments at the Time of Hospital Discharge:   Respiratory Treatments:   Oxygen Therapy:  is on oxygen at 2 L/min per nasal cannula. Ventilator:    - No ventilator support    Rehab Therapies: Physical Therapy and Occupational Therapy  Weight Bearing Status/Restrictions: No weight bearing restrictions  Other Medical Equipment (for information only, NOT a DME order):  walker and bedside commode  Other Treatments:     Patient's personal belongings (please select all that are sent with patient):  Glasses    RN SIGNATURE:  Electronically signed by Mandy Hess RN on 3/6/23 at 10:27 AM EST    CASE MANAGEMENT/SOCIAL WORK SECTION    Inpatient Status Date: ***    Readmission Risk Assessment Score:  Readmission Risk              Risk of Unplanned Readmission:  15           Discharging to Facility/ Agency   Name:   Address:  Phone:  Fax:    Dialysis Facility (if applicable)   Name:  Address:  Dialysis Schedule:  Phone:  Fax:    / signature: {Esignature:[id number]}    PHYSICIAN SECTION    Prognosis: {Prognosis:7782946685}    Condition at Discharge: [address] [place] [address] Patient Condition:[id number]}    Rehab Potential (if transferring to Rehab): {Prognosis:5991736046}    Recommended Labs or Other Treatments After Discharge: ***    Physician Certification: I certify the above information and transfer of Portia Stroud  is necessary for the continuing treatment of the diagnosis listed and that she requires {Admit to Appropriate Level of Care:40150} for {GREATER/LESS:684952762} 30 days.      Update Admission H&P: {CHP DME Changes in OAYND:582036917}    PHYSICIAN SIGNATURE:  {Esignature:[id number]}

## 2023-03-06 NOTE — DISCHARGE SUMMARY
Hospital Medicine Discharge Summary    Patient ID: Anthony Perdomo      Patient's PCP: No primary care provider on file. Admit Date: 3/1/2023     Discharge Date:   3/6/23    Admitting Physician: Jj Shields MD     Discharge Physician: Aron Hdz MD     Discharge Diagnoses: Active Hospital Problems    Diagnosis Date Noted    Intertrochanteric fracture of right femur, closed, initial encounter [facility]) Tiago Stamp 03/01/2023     Priority: Medium    History of femur fracture [Z87.81] 03/01/2023     Priority: Medium       The patient was seen and examined on day of discharge and this discharge summary is in conjunction with any daily progress note from day of discharge. Hospital Course:   78years old female, presented after a fall inability to ambulate, imaging revealed left intertrochanteric fracture, patient status post ORIF. Patient admitted to hospital medicine service. Worked well with therapy post op. Discharged in stable condition to SNF on 3/6/23          Exam:     /82   Pulse 78   Temp 98 °F (36.7 °C) (Temporal)   Resp 16   Ht 5' 7\" (1.702 m)   Wt 210 lb 9.6 oz (95.5 kg)   SpO2 97%   BMI 32.98 kg/m²   General Appearance: alert and oriented to person, place and time, in no acute distress  HEENT: normocephalic and atraumatic, pupils equal, round, and reactive to light, Ssupple and non-tender without mass, no thyromegaly   Cardiovascular: normal rate, regular rhythm, normal S1 and S2, no murmurs, rubs, clicks, or gallops, distal pulses intact, no carotid bruits, no JVD  Pulmonary/Chest: clear to auscultation bilaterally  Abdomen: soft, non-tender, non-distended, normal bowel sounds, no masses   Extremities: Surgical area examined, incisional area covered with surgical dressing, no bloody drainage no discharge noticed, surrounding area having no significant swelling erythema or any signs of infection. Overall looking well than my previous evaluation.  No cyanosis, clubbing or edema, pulse   Musculoskeletal: normal range of motion, no joint swelling, deformity or tenderness  Neurological: alert, oriented, normal speech, no focal findings or movement disorder noted  Skin: warm and dry, no rash or erythema       Consults:     IP CONSULT TO ORTHOPEDIC SURGERY  IP CONSULT TO INTERNAL MEDICINE  IP CONSULT TO CASE MANAGEMENT  IP CONSULT TO SOCIAL WORK    Significant Diagnostic Studies:     XR HIP 2-3 VW W PELVIS RIGHT   Final Result   intertrochanteric fracture of the right femur with internal fixation with the   normal alignment. There is associated postoperative changes. Degenerative changes in the right knee. Fluoroscopy for surgery. Fluoroscopy is was provided for internal fixation of the right hip fracture. Total fluoroscopy time is 92.5 seconds and accumulated dose is 23.35 mGy. 8   fluoroscopic images are provided. The images demonstrate internal fixation of the right femoral fracture. Impression      Fluoroscopy assistance are provided for internal fixation of the right   femoral fracture. XR FEMUR RIGHT (MIN 2 VIEWS)   Final Result   intertrochanteric fracture of the right femur with internal fixation with the   normal alignment. There is associated postoperative changes. Degenerative changes in the right knee. Fluoroscopy for surgery. Fluoroscopy is was provided for internal fixation of the right hip fracture. Total fluoroscopy time is 92.5 seconds and accumulated dose is 23.35 mGy. 8   fluoroscopic images are provided. The images demonstrate internal fixation of the right femoral fracture. Impression      Fluoroscopy assistance are provided for internal fixation of the right   femoral fracture. FLUORO FOR SURGICAL PROCEDURES   Final Result   intertrochanteric fracture of the right femur with internal fixation with the   normal alignment. There is associated postoperative changes.       Degenerative changes in the right knee. Fluoroscopy for surgery. Fluoroscopy is was provided for internal fixation of the right hip fracture. Total fluoroscopy time is 92.5 seconds and accumulated dose is 23.35 mGy. 8   fluoroscopic images are provided. The images demonstrate internal fixation of the right femoral fracture. Impression      Fluoroscopy assistance are provided for internal fixation of the right   femoral fracture. CT HEAD WO CONTRAST   Final Result   1. There is no acute intracranial abnormality. Specifically, there is no   intracranial hemorrhage. 2. Atrophy and periventricular leukomalacia,   3. 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 .         CT CERVICAL SPINE WO CONTRAST   Final Result   1. There is no acute compression fracture or subluxation of the cervical   spine. 2. Multilevel degenerative disc and degenerative joint disease. XR HIP 2-3 VW W PELVIS RIGHT   Final Result   Intertrochanteric fracture. XR HAND RIGHT (MIN 3 VIEWS)   Final Result   No acute osseous abnormality. XR CHEST PORTABLE   Final Result   No acute cardiopulmonary process. Question minimal atelectasis left lung base. XR FEMUR RIGHT (MIN 2 VIEWS)   Final Result   Comminuted intertrochanteric fracture. XR WRIST RIGHT (MIN 3 VIEWS)   Final Result   Normal wrist radiographs                 Disposition:  SNF     Discharge Instructions/Follow-up:  Keep scheduled follow up appointments. Take medications as prescribed. Code Status:  Full Code     Activity: activity as tolerated    Diet: regular diet    Labs:  For convenience and continuity at follow-up the following most recent labs are provided:      CBC:    Lab Results   Component Value Date/Time    WBC 10.2 03/06/2023 05:49 AM    HGB 8.9 03/06/2023 05:49 AM    HCT 29.0 03/06/2023 05:49 AM     03/06/2023 05:49 AM       Renal:    Lab Results   Component Value Date/Time     03/06/2023 05:49 AM    K 4.2 03/06/2023 05:49 AM    K 4.4 03/05/2023 04:26 AM     03/06/2023 05:49 AM    CO2 28 03/06/2023 05:49 AM    BUN 15 03/06/2023 05:49 AM    CREATININE 0.6 03/06/2023 05:49 AM    CALCIUM 8.5 03/06/2023 05:49 AM       Discharge Medications:     Current Discharge Medication List             Details   oxyCODONE (ROXICODONE) 5 MG immediate release tablet Take 1 tablet by mouth every 6 hours as needed for Pain for up to 7 days. Intended supply: 7 days.  Take lowest dose possible to manage pain Max Daily Amount: 20 mg  Qty: 28 tablet, Refills: 0    Comments: Reduce doses taken as pain becomes manageable  Associated Diagnoses: Closed displaced intertrochanteric fracture of right femur, initial encounter ([facility])      aspirin 325 MG EC tablet Take 1 tablet by mouth 2 times daily for 28 days  Qty: 56 tablet, Refills: 0                Details   acetaminophen (TYLENOL) 500 MG tablet Take 2 tablets by mouth every 6 hours as needed for Fever or Pain  Qty: 120 tablet, Refills: 3      albuterol sulfate HFA (PROVENTIL;VENTOLIN;PROAIR) 108 (90 Base) MCG/ACT inhaler Inhale 2 puffs into the lungs every 4 hours as needed for Wheezing or Shortness of Breath  Qty: 18 g, Refills: 3      fluticasone-vilanterol (BREO ELLIPTA) 100-25 MCG/INH AEPB inhaler Inhale 2 puffs into the lungs daily  Qty: 1 each, Refills: 0      lactobacillus (CULTURELLE) CAPS capsule Take 2 capsules by mouth 2 times daily  Qty: 1 capsule, Refills: 0      furosemide (LASIX) 20 MG tablet Take 1 tablet by mouth daily  Qty: 60 tablet, Refills: 3      Multiple Vitamin (MULTIVITAMIN) TABS tablet Take 1 tablet by mouth daily  Qty: 1 tablet, Refills: 0      vitamin D (CHOLECALCIFEROL) 50 MCG (2000 UT) TABS tablet Take 1 tablet by mouth daily  Qty: 30 tablet, Refills: 0      Ergocalciferol (VITAMIN D) 52518 units CAPS Take 50,000 Units by mouth once a week  Qty: 12 capsule, Refills: 0             Time Spent on discharge is more than 45 minutes in the examination, evaluation, counseling and review of medications and discharge plan.       Signed:    Orvis Gowers, MD   3/6/2023

## 2023-03-06 NOTE — CARE COORDINATION
Discharge order noted. Transportation set up via [facility] [place] for 11 am today to [facility]. Charge nurse, RN, liaison, patient and granddaughter Dontae Jesus all notified. Rapid Covid-19 test given to RN to do today. 7000 completed and placed in envelope in soft chart. Completed and signed ambulance form in envelope in soft chart.    Kathy Arnold RN CM  [phone]

## 2023-03-20 ENCOUNTER — TELEPHONE (OUTPATIENT)
Dept: ORTHOPEDIC SURGERY | Age: 78
End: 2023-03-20

## 2023-03-20 DIAGNOSIS — S72.141A INTERTROCHANTERIC FRACTURE OF RIGHT FEMUR, CLOSED, INITIAL ENCOUNTER (HCC): Primary | ICD-10-CM

## 2023-03-20 NOTE — TELEPHONE ENCOUNTER
Call returned to [facility] at [phone] ext [phone]. Spoke to Wound Care nurse, Luana Dodson. He advised incision is well approximated, no drainage, no bruising. He feels confident sutures are able to be removed. Requested image to be sent to office. Luana Dodson advised he does not have a secure way to send image. Routed to providers for recommendation. Luana Dodson - Ph. [phone] ext.  [address] [facility]

## 2023-03-20 NOTE — TELEPHONE ENCOUNTER
VM from [place] to cancel appt d/t + Covid r/s after 3/29/23. Call back to [place] r/s'd   Future Appointments   Date Time Provider Marshall Goodwin   4/3/2023 10:30 AM SCHEDULE, SE [facility]TH[facility]     Nurse is asking if and when they can remove sutures. Routing to provider/clinical staff.

## 2023-03-20 NOTE — TELEPHONE ENCOUNTER
Date of Procedure: 3/2/2023   Pre-Op Diagnosis: Closed comminuted intertrochanteric fracture of proximal femur, right, initial encounter ([facility] [S72.141A]  Post-Op Diagnosis: Same     Procedure(s):  RIGHT HIP CEPHALOMEDULLARY NAIL FOR RIGHT INTERTROCHANTERIC HIP FRACTURE  Surgeon(s): Laura Brower MD    Would recommend having an image of incisions prior to suture removal to clinical staff  Electronically signed by Celina Regan PA-C on 3/20/2023 at 12:06 PM

## 2023-03-21 NOTE — TELEPHONE ENCOUNTER
Call placed to [facility], [place] care nurse at [facility]. No answer. Left voicemail on secure line. Encouraged to call with questions or concerns.     Future Appointments   Date Time Provider Marshall Goodwin   4/3/2023 10:30 AM SCHEDULE, SE ORTH[facility]

## 2023-04-03 ENCOUNTER — HOSPITAL ENCOUNTER (OUTPATIENT)
Dept: GENERAL RADIOLOGY | Age: 78
Discharge: HOME | End: 2023-04-05
Payer: COMMERCIAL

## 2023-04-03 ENCOUNTER — OFFICE VISIT (OUTPATIENT)
Dept: ORTHOPEDIC SURGERY | Age: 78
End: 2023-04-03
Payer: COMMERCIAL

## 2023-04-03 DIAGNOSIS — M25.561 PAIN IN BOTH KNEES, UNSPECIFIED CHRONICITY: ICD-10-CM

## 2023-04-03 DIAGNOSIS — M25.562 PAIN IN BOTH KNEES, UNSPECIFIED CHRONICITY: Primary | ICD-10-CM

## 2023-04-03 DIAGNOSIS — M25.561 PAIN IN BOTH KNEES, UNSPECIFIED CHRONICITY: Primary | ICD-10-CM

## 2023-04-03 DIAGNOSIS — S72.141D CLOSED DISPLACED INTERTROCHANTERIC FRACTURE OF RIGHT FEMUR WITH ROUTINE HEALING: ICD-10-CM

## 2023-04-03 DIAGNOSIS — M25.562 PAIN IN BOTH KNEES, UNSPECIFIED CHRONICITY: ICD-10-CM

## 2023-04-03 DIAGNOSIS — S72.141A INTERTROCHANTERIC FRACTURE OF RIGHT FEMUR, CLOSED, INITIAL ENCOUNTER (HCC): ICD-10-CM

## 2023-04-03 PROCEDURE — 73565 X-RAY EXAM OF KNEES: CPT

## 2023-04-03 PROCEDURE — 99024 POSTOP FOLLOW-UP VISIT: CPT | Performed by: PHYSICIAN ASSISTANT

## 2023-04-03 PROCEDURE — 99213 OFFICE O/P EST LOW 20 MIN: CPT

## 2023-04-03 PROCEDURE — 73502 X-RAY EXAM HIP UNI 2-3 VIEWS: CPT

## 2023-04-03 RX ORDER — BISACODYL 10 MG
10 SUPPOSITORY, RECTAL RECTAL DAILY
COMMUNITY

## 2023-04-03 NOTE — PROGRESS NOTES
of aspirin for DVT prophylaxis    Future Appointments   Date Time Provider Marshall Christa   5/3/2023  1:15 PM Susi Marie MD [facility]         Electronically signed by Destinee Guardado PA-C on 4/3/2023 at 12:00 PM  Note: This report was completed using [employer] voiced recognition software. Every effort has been made to ensure accuracy; however, inadvertent computerized transcription errors may be present.

## 2023-04-03 NOTE — PATIENT INSTRUCTIONS
Instructions for facility  Continue weightbearing as tolerated to the right lower extremity. Use walker when needed. Continue physical therapy to work on ROM, gait training, strengthening, modalities  Once home health physical therapy has finished, call the orthopedic office to coordinate outpatient physical therapy. Continue Voltaren gel to both knees up to 4 times per day if needed for pain control. Can consider steroid injections to both knees in the future if needed. Recommend knee braces to both knees while weightbearing for better support due to severe osteoarthritis. Recommend braces with collateral support.        Future Appointments   Date Time Provider Marshall Goodwin   5/3/2023  1:15 PM Leonel Nguyen MD [facility]

## 2024-01-01 NOTE — PROGRESS NOTES
Comprehensive Nutrition Assessment    Type and Reason for Visit:  Initial (LOS)    Nutrition Recommendations/Plan:   Recommend and start Ensure high protein supplement daily and Boost Pudding supplement BID to help meet increased nutritional needs. Malnutrition Assessment:  Malnutrition Status: At risk for malnutrition (Comment) (11/09/22 1030)    Context:  Acute Illness     Findings of the 6 clinical characteristics of malnutrition:  Energy Intake:  75% or less of estimated energy requirements for 7 or more days  Weight Loss:  Unable to assess (d/t poor recent weight history ; subjective weight loss noted)     Body Fat Loss:  Unable to assess (d/t COVID isolation)     Muscle Mass Loss:  Unable to assess (d/t COVID isolation)    Fluid Accumulation:  No significant fluid accumulation     Strength:  Not Performed    Nutrition Assessment:    Patients po intake has been a little sporadic, averaging 50-75% of meals served ; adm w/ frequent falls ; noted acute respiratory failure with hypoxia 2/2 COVID-19 ; noted pelvic mass and lung lesion ; noted Vitamin D deficiency ; will start ONS    Nutrition Related Findings:    -I&Os (-7.4 L), 1-2+ edema, active BS, rounded abd, laceration ; Wound Type: None       Current Nutrition Intake & Therapies:    Average Meal Intake: 51-75%     ADULT DIET; Regular; Low Sodium (2 gm); 1500 ml    Anthropometric Measures:  Height: 5' 7\" (170.2 cm)  Ideal Body Weight (IBW): 135 lbs (61 kg)    Admission Body Weight: 187 lb (84.8 kg) (11/3, bedscale)  Current Body Weight: 181 lb (82.1 kg) (11/9, actual), 134.1 % IBW. Current BMI (kg/m2): 28.3  Usual Body Weight:  (UTO ; EMR shows past weights of 187# bedscale on 11/3/22 and 235# actual on 1/22/18 ; subjective weight loss of 68# in the past 1 year although no evidence of this)                       BMI Categories: Overweight (BMI 25.0-29. 9)    Estimated Daily Nutrient Needs:  Energy Requirements Based On: Formula  Weight Used for Energy Requirements: Current  Energy (kcal/day): 7195-8742 (REE 1350 x 1.2 SF)  Weight Used for Protein Requirements: Ideal  Protein (g/day): 80-95 (1.5-1.8g/kg IBW)  Method Used for Fluid Requirements: 1 ml/kcal  Fluid (ml/day): 1500ml FR    Nutrition Diagnosis:   Inadequate oral intake related to inadequate protein-energy intake (2/2 COVID-19) as evidenced by poor intake prior to admission, intake 51-75%    Nutrition Interventions:   Food and/or Nutrient Delivery: Continue Current Diet, Start Oral Nutrition Supplement  Nutrition Education/Counseling: Education not indicated  Coordination of Nutrition Care: Continue to monitor while inpatient       Goals:  Previous Goal Met: Progressing toward Goal(s)  Goals: PO intake 75% or greater, by next RD assessment       Nutrition Monitoring and Evaluation:   Behavioral-Environmental Outcomes: None Identified  Food/Nutrient Intake Outcomes: Food and Nutrient Intake, Supplement Intake  Physical Signs/Symptoms Outcomes: Chewing or Swallowing, Biochemical Data, GI Status, Fluid Status or Edema, Hemodynamic Status, Meal Time Behavior, Nutrition Focused Physical Findings, Skin, Weight    Discharge Planning:     Too soon to determine     John Washington RD, LD  Contact: [phone] patient stated/observation

## 2024-10-16 ENCOUNTER — HOSPITAL ENCOUNTER (INPATIENT)
Facility: HOSPITAL | Age: 79
LOS: 3 days | Discharge: HOME | End: 2024-10-19
Attending: STUDENT IN AN ORGANIZED HEALTH CARE EDUCATION/TRAINING PROGRAM | Admitting: INTERNAL MEDICINE
Payer: COMMERCIAL

## 2024-10-16 DIAGNOSIS — I63.9 ACUTE CVA (CEREBROVASCULAR ACCIDENT) (MULTI): Primary | ICD-10-CM

## 2024-10-16 DIAGNOSIS — I67.89 OTHER CEREBROVASCULAR DISEASE: ICD-10-CM

## 2024-10-16 LAB
ERYTHROCYTE [DISTWIDTH] IN BLOOD BY AUTOMATED COUNT: 14 % (ref 11.5–14.5)
GLUCOSE BLD MANUAL STRIP-MCNC: 93 MG/DL (ref 74–99)
HCT VFR BLD AUTO: 42.1 % (ref 36–46)
HGB BLD-MCNC: 13.8 G/DL (ref 12–16)
MCH RBC QN AUTO: 29.6 PG (ref 26–34)
MCHC RBC AUTO-ENTMCNC: 32.8 G/DL (ref 32–36)
MCV RBC AUTO: 90 FL (ref 80–100)
NRBC BLD-RTO: 0 /100 WBCS (ref 0–0)
PLATELET # BLD AUTO: 191 X10*3/UL (ref 150–450)
RBC # BLD AUTO: 4.66 X10*6/UL (ref 4–5.2)
WBC # BLD AUTO: 9.9 X10*3/UL (ref 4.4–11.3)

## 2024-10-16 PROCEDURE — G0378 HOSPITAL OBSERVATION PER HR: HCPCS

## 2024-10-16 PROCEDURE — 85027 COMPLETE CBC AUTOMATED: CPT | Performed by: NURSE PRACTITIONER

## 2024-10-16 PROCEDURE — 2500000005 HC RX 250 GENERAL PHARMACY W/O HCPCS: Performed by: NURSE PRACTITIONER

## 2024-10-16 PROCEDURE — 82947 ASSAY GLUCOSE BLOOD QUANT: CPT

## 2024-10-16 PROCEDURE — 2500000001 HC RX 250 WO HCPCS SELF ADMINISTERED DRUGS (ALT 637 FOR MEDICARE OP): Performed by: NURSE PRACTITIONER

## 2024-10-16 PROCEDURE — 2500000004 HC RX 250 GENERAL PHARMACY W/ HCPCS (ALT 636 FOR OP/ED): Performed by: NURSE PRACTITIONER

## 2024-10-16 PROCEDURE — 36415 COLL VENOUS BLD VENIPUNCTURE: CPT | Performed by: NURSE PRACTITIONER

## 2024-10-16 PROCEDURE — 2060000001 HC INTERMEDIATE ICU ROOM DAILY

## 2024-10-16 PROCEDURE — 99223 1ST HOSP IP/OBS HIGH 75: CPT | Performed by: NURSE PRACTITIONER

## 2024-10-16 RX ORDER — NAPROXEN SODIUM 220 MG/1
81 TABLET, FILM COATED ORAL DAILY
Status: DISCONTINUED | OUTPATIENT
Start: 2024-10-17 | End: 2024-10-19 | Stop reason: HOSPADM

## 2024-10-16 RX ORDER — LABETALOL HYDROCHLORIDE 5 MG/ML
10 INJECTION, SOLUTION INTRAVENOUS EVERY 10 MIN PRN
Status: DISCONTINUED | OUTPATIENT
Start: 2024-10-16 | End: 2024-10-16

## 2024-10-16 RX ORDER — HYDRALAZINE HYDROCHLORIDE 25 MG/1
25 TABLET, FILM COATED ORAL EVERY 6 HOURS PRN
Status: DISCONTINUED | OUTPATIENT
Start: 2024-10-18 | End: 2024-10-16

## 2024-10-16 RX ORDER — BUDESONIDE 0.5 MG/2ML
0.5 INHALANT ORAL
Status: DISCONTINUED | OUTPATIENT
Start: 2024-10-16 | End: 2024-10-19 | Stop reason: HOSPADM

## 2024-10-16 RX ORDER — ACETAMINOPHEN 650 MG/1
650 SUPPOSITORY RECTAL EVERY 4 HOURS PRN
Status: DISCONTINUED | OUTPATIENT
Start: 2024-10-16 | End: 2024-10-19 | Stop reason: HOSPADM

## 2024-10-16 RX ORDER — FLUTICASONE FUROATE AND VILANTEROL 100; 25 UG/1; UG/1
1 POWDER RESPIRATORY (INHALATION)
Status: DISCONTINUED | OUTPATIENT
Start: 2024-10-17 | End: 2024-10-16

## 2024-10-16 RX ORDER — CLOPIDOGREL BISULFATE 75 MG/1
75 TABLET ORAL DAILY
Status: DISCONTINUED | OUTPATIENT
Start: 2024-10-17 | End: 2024-10-19 | Stop reason: HOSPADM

## 2024-10-16 RX ORDER — HYDRALAZINE HYDROCHLORIDE 20 MG/ML
10 INJECTION INTRAMUSCULAR; INTRAVENOUS
Status: DISCONTINUED | OUTPATIENT
Start: 2024-10-16 | End: 2024-10-16

## 2024-10-16 RX ORDER — ACETAMINOPHEN 325 MG/1
650 TABLET ORAL EVERY 4 HOURS PRN
Status: DISCONTINUED | OUTPATIENT
Start: 2024-10-16 | End: 2024-10-19 | Stop reason: HOSPADM

## 2024-10-16 RX ORDER — FORMOTEROL FUMARATE DIHYDRATE 20 UG/2ML
20 SOLUTION RESPIRATORY (INHALATION)
Status: DISCONTINUED | OUTPATIENT
Start: 2024-10-16 | End: 2024-10-19 | Stop reason: HOSPADM

## 2024-10-16 RX ORDER — ENOXAPARIN SODIUM 100 MG/ML
40 INJECTION SUBCUTANEOUS EVERY 24 HOURS
Status: DISCONTINUED | OUTPATIENT
Start: 2024-10-16 | End: 2024-10-19 | Stop reason: HOSPADM

## 2024-10-16 RX ORDER — ATORVASTATIN CALCIUM 40 MG/1
40 TABLET, FILM COATED ORAL NIGHTLY
Status: DISCONTINUED | OUTPATIENT
Start: 2024-10-16 | End: 2024-10-19 | Stop reason: HOSPADM

## 2024-10-16 RX ORDER — ACETAMINOPHEN 160 MG/5ML
650 SOLUTION ORAL EVERY 4 HOURS PRN
Status: DISCONTINUED | OUTPATIENT
Start: 2024-10-16 | End: 2024-10-19 | Stop reason: HOSPADM

## 2024-10-16 RX ADMIN — Medication 2 L/MIN: at 23:36

## 2024-10-16 RX ADMIN — ENOXAPARIN SODIUM 40 MG: 40 INJECTION SUBCUTANEOUS at 21:44

## 2024-10-16 RX ADMIN — ATORVASTATIN CALCIUM 40 MG: 40 TABLET ORAL at 21:18

## 2024-10-16 ASSESSMENT — PAIN SCALES - GENERAL: PAINLEVEL_OUTOF10: 0 - NO PAIN

## 2024-10-17 ENCOUNTER — APPOINTMENT (OUTPATIENT)
Dept: CARDIOLOGY | Facility: HOSPITAL | Age: 79
End: 2024-10-17
Payer: COMMERCIAL

## 2024-10-17 ENCOUNTER — APPOINTMENT (OUTPATIENT)
Dept: NEUROLOGY | Facility: HOSPITAL | Age: 79
End: 2024-10-17
Payer: COMMERCIAL

## 2024-10-17 LAB
ANION GAP SERPL CALC-SCNC: 10 MMOL/L (ref 10–20)
AORTIC VALVE MEAN GRADIENT: 7 MMHG
AORTIC VALVE PEAK VELOCITY: 1.89 M/S
AV PEAK GRADIENT: 14.4 MMHG
AVA (PEAK VEL): 2.24 CM2
AVA (VTI): 2.44 CM2
BASOPHILS # BLD AUTO: 0.13 X10*3/UL (ref 0–0.1)
BASOPHILS NFR BLD AUTO: 1.4 %
BNP SERPL-MCNC: 92 PG/ML (ref 0–99)
BUN SERPL-MCNC: 14 MG/DL (ref 6–23)
CALCIUM SERPL-MCNC: 8.5 MG/DL (ref 8.6–10.3)
CHLORIDE SERPL-SCNC: 104 MMOL/L (ref 98–107)
CHOLEST SERPL-MCNC: 191 MG/DL (ref 0–199)
CHOLESTEROL/HDL RATIO: 4.9
CO2 SERPL-SCNC: 29 MMOL/L (ref 21–32)
CREAT SERPL-MCNC: 0.63 MG/DL (ref 0.5–1.05)
EGFRCR SERPLBLD CKD-EPI 2021: >90 ML/MIN/1.73M*2
EJECTION FRACTION APICAL 4 CHAMBER: 40.4
EJECTION FRACTION: 63 %
EOSINOPHIL # BLD AUTO: 0.91 X10*3/UL (ref 0–0.4)
EOSINOPHIL NFR BLD AUTO: 10.1 %
ERYTHROCYTE [DISTWIDTH] IN BLOOD BY AUTOMATED COUNT: 14.2 % (ref 11.5–14.5)
EST. AVERAGE GLUCOSE BLD GHB EST-MCNC: 114 MG/DL
GLUCOSE BLD MANUAL STRIP-MCNC: 105 MG/DL (ref 74–99)
GLUCOSE BLD MANUAL STRIP-MCNC: 129 MG/DL (ref 74–99)
GLUCOSE BLD MANUAL STRIP-MCNC: 199 MG/DL (ref 74–99)
GLUCOSE BLD MANUAL STRIP-MCNC: 72 MG/DL (ref 74–99)
GLUCOSE SERPL-MCNC: 86 MG/DL (ref 74–99)
HBA1C MFR BLD: 5.6 %
HCT VFR BLD AUTO: 42.2 % (ref 36–46)
HDLC SERPL-MCNC: 38.8 MG/DL
HGB BLD-MCNC: 13.1 G/DL (ref 12–16)
IMM GRANULOCYTES # BLD AUTO: 0.02 X10*3/UL (ref 0–0.5)
IMM GRANULOCYTES NFR BLD AUTO: 0.2 % (ref 0–0.9)
LDLC SERPL CALC-MCNC: 131 MG/DL
LEFT ATRIUM VOLUME AREA LENGTH INDEX BSA: 35.5 ML/M2
LEFT VENTRICLE INTERNAL DIMENSION DIASTOLE: 4.7 CM (ref 3.5–6)
LEFT VENTRICULAR OUTFLOW TRACT DIAMETER: 2.2 CM
LYMPHOCYTES # BLD AUTO: 3.22 X10*3/UL (ref 0.8–3)
LYMPHOCYTES NFR BLD AUTO: 35.9 %
MCH RBC QN AUTO: 28.4 PG (ref 26–34)
MCHC RBC AUTO-ENTMCNC: 31 G/DL (ref 32–36)
MCV RBC AUTO: 92 FL (ref 80–100)
MITRAL VALVE E/A RATIO: 0.73
MONOCYTES # BLD AUTO: 0.54 X10*3/UL (ref 0.05–0.8)
MONOCYTES NFR BLD AUTO: 6 %
NEUTROPHILS # BLD AUTO: 4.15 X10*3/UL (ref 1.6–5.5)
NEUTROPHILS NFR BLD AUTO: 46.4 %
NON HDL CHOLESTEROL: 152 MG/DL (ref 0–149)
NRBC BLD-RTO: 0 /100 WBCS (ref 0–0)
PLATELET # BLD AUTO: 191 X10*3/UL (ref 150–450)
POTASSIUM SERPL-SCNC: 4.1 MMOL/L (ref 3.5–5.3)
RBC # BLD AUTO: 4.61 X10*6/UL (ref 4–5.2)
RIGHT VENTRICLE FREE WALL PEAK S': 16 CM/S
RIGHT VENTRICLE PEAK SYSTOLIC PRESSURE: 13.6 MMHG
SODIUM SERPL-SCNC: 139 MMOL/L (ref 136–145)
TRICUSPID ANNULAR PLANE SYSTOLIC EXCURSION: 2 CM
TRIGL SERPL-MCNC: 106 MG/DL (ref 0–149)
VLDL: 21 MG/DL (ref 0–40)
WBC # BLD AUTO: 9 X10*3/UL (ref 4.4–11.3)

## 2024-10-17 PROCEDURE — 94640 AIRWAY INHALATION TREATMENT: CPT

## 2024-10-17 PROCEDURE — 95819 EEG AWAKE AND ASLEEP: CPT | Performed by: PSYCHIATRY & NEUROLOGY

## 2024-10-17 PROCEDURE — 83036 HEMOGLOBIN GLYCOSYLATED A1C: CPT | Mod: AHULAB | Performed by: NURSE PRACTITIONER

## 2024-10-17 PROCEDURE — 97535 SELF CARE MNGMENT TRAINING: CPT | Mod: GO

## 2024-10-17 PROCEDURE — 80061 LIPID PANEL: CPT | Performed by: NURSE PRACTITIONER

## 2024-10-17 PROCEDURE — 99232 SBSQ HOSP IP/OBS MODERATE 35: CPT | Performed by: INTERNAL MEDICINE

## 2024-10-17 PROCEDURE — 36415 COLL VENOUS BLD VENIPUNCTURE: CPT | Performed by: NURSE PRACTITIONER

## 2024-10-17 PROCEDURE — 83880 ASSAY OF NATRIURETIC PEPTIDE: CPT | Performed by: NURSE PRACTITIONER

## 2024-10-17 PROCEDURE — 93306 TTE W/DOPPLER COMPLETE: CPT

## 2024-10-17 PROCEDURE — 2500000002 HC RX 250 W HCPCS SELF ADMINISTERED DRUGS (ALT 637 FOR MEDICARE OP, ALT 636 FOR OP/ED)

## 2024-10-17 PROCEDURE — 2060000001 HC INTERMEDIATE ICU ROOM DAILY

## 2024-10-17 PROCEDURE — 85025 COMPLETE CBC W/AUTO DIFF WBC: CPT | Performed by: NURSE PRACTITIONER

## 2024-10-17 PROCEDURE — 99223 1ST HOSP IP/OBS HIGH 75: CPT | Performed by: PSYCHIATRY & NEUROLOGY

## 2024-10-17 PROCEDURE — 97161 PT EVAL LOW COMPLEX 20 MIN: CPT | Mod: GP

## 2024-10-17 PROCEDURE — 2500000004 HC RX 250 GENERAL PHARMACY W/ HCPCS (ALT 636 FOR OP/ED): Performed by: NURSE PRACTITIONER

## 2024-10-17 PROCEDURE — 93306 TTE W/DOPPLER COMPLETE: CPT | Performed by: INTERNAL MEDICINE

## 2024-10-17 PROCEDURE — 80048 BASIC METABOLIC PNL TOTAL CA: CPT | Performed by: NURSE PRACTITIONER

## 2024-10-17 PROCEDURE — G0378 HOSPITAL OBSERVATION PER HR: HCPCS

## 2024-10-17 PROCEDURE — 2500000005 HC RX 250 GENERAL PHARMACY W/O HCPCS: Performed by: NURSE PRACTITIONER

## 2024-10-17 PROCEDURE — 97165 OT EVAL LOW COMPLEX 30 MIN: CPT | Mod: GO

## 2024-10-17 PROCEDURE — 82947 ASSAY GLUCOSE BLOOD QUANT: CPT

## 2024-10-17 PROCEDURE — 95819 EEG AWAKE AND ASLEEP: CPT

## 2024-10-17 PROCEDURE — 2500000001 HC RX 250 WO HCPCS SELF ADMINISTERED DRUGS (ALT 637 FOR MEDICARE OP): Performed by: NURSE PRACTITIONER

## 2024-10-17 RX ORDER — FLUTICASONE FUROATE, UMECLIDINIUM BROMIDE AND VILANTEROL TRIFENATATE 100; 62.5; 25 UG/1; UG/1; UG/1
1 POWDER RESPIRATORY (INHALATION) DAILY PRN
COMMUNITY
Start: 2024-08-12

## 2024-10-17 RX ORDER — ALBUTEROL SULFATE 90 UG/1
2 INHALANT RESPIRATORY (INHALATION) EVERY 4 HOURS PRN
COMMUNITY
Start: 2024-08-12

## 2024-10-17 RX ADMIN — TIOTROPIUM BROMIDE INHALATION SPRAY 2 PUFF: 3.12 SPRAY, METERED RESPIRATORY (INHALATION) at 07:27

## 2024-10-17 RX ADMIN — ASPIRIN 81 MG 81 MG: 81 TABLET ORAL at 08:49

## 2024-10-17 RX ADMIN — Medication 2 L/MIN: at 07:26

## 2024-10-17 RX ADMIN — CLOPIDOGREL 75 MG: 75 TABLET ORAL at 08:49

## 2024-10-17 RX ADMIN — FORMOTEROL FUMARATE DIHYDRATE 20 MCG: 20 SOLUTION RESPIRATORY (INHALATION) at 07:26

## 2024-10-17 RX ADMIN — BUDESONIDE 0.5 MG: 0.5 INHALANT ORAL at 20:16

## 2024-10-17 RX ADMIN — ENOXAPARIN SODIUM 40 MG: 40 INJECTION SUBCUTANEOUS at 20:55

## 2024-10-17 RX ADMIN — BUDESONIDE 0.5 MG: 0.5 INHALANT ORAL at 07:26

## 2024-10-17 RX ADMIN — ATORVASTATIN CALCIUM 40 MG: 40 TABLET ORAL at 20:55

## 2024-10-17 RX ADMIN — FORMOTEROL FUMARATE DIHYDRATE 20 MCG: 20 SOLUTION RESPIRATORY (INHALATION) at 20:16

## 2024-10-17 ASSESSMENT — COGNITIVE AND FUNCTIONAL STATUS - GENERAL
CLIMB 3 TO 5 STEPS WITH RAILING: A LITTLE
HELP NEEDED FOR BATHING: A LITTLE
MOVING FROM LYING ON BACK TO SITTING ON SIDE OF FLAT BED WITH BEDRAILS: A LITTLE
DRESSING REGULAR UPPER BODY CLOTHING: A LITTLE
WALKING IN HOSPITAL ROOM: A LITTLE
TURNING FROM BACK TO SIDE WHILE IN FLAT BAD: A LITTLE
MOVING TO AND FROM BED TO CHAIR: A LITTLE
CLIMB 3 TO 5 STEPS WITH RAILING: A LITTLE
MOBILITY SCORE: 18
STANDING UP FROM CHAIR USING ARMS: A LITTLE
TURNING FROM BACK TO SIDE WHILE IN FLAT BAD: A LITTLE
DAILY ACTIVITIY SCORE: 23
TOILETING: A LITTLE
MOBILITY SCORE: 19
MOVING TO AND FROM BED TO CHAIR: A LITTLE
DAILY ACTIVITIY SCORE: 21
DRESSING REGULAR LOWER BODY CLOTHING: A LITTLE
WALKING IN HOSPITAL ROOM: A LITTLE
STANDING UP FROM CHAIR USING ARMS: A LITTLE

## 2024-10-17 ASSESSMENT — ENCOUNTER SYMPTOMS
CONSTITUTIONAL NEGATIVE: 1
CARDIOVASCULAR NEGATIVE: 1
RESPIRATORY NEGATIVE: 1
GASTROINTESTINAL NEGATIVE: 1

## 2024-10-17 ASSESSMENT — PAIN SCALES - GENERAL
PAINLEVEL_OUTOF10: 0 - NO PAIN

## 2024-10-17 ASSESSMENT — ACTIVITIES OF DAILY LIVING (ADL)
ADL_ASSISTANCE: INDEPENDENT
HOME_MANAGEMENT_TIME_ENTRY: 15
ADL_ASSISTANCE: INDEPENDENT

## 2024-10-17 ASSESSMENT — PAIN - FUNCTIONAL ASSESSMENT
PAIN_FUNCTIONAL_ASSESSMENT: 0-10

## 2024-10-17 NOTE — PROGRESS NOTES
Pharmacy Medication History Review    Heaven Panchal is a 79 y.o. female admitted for Acute CVA (cerebrovascular accident) (Multi). Pharmacy reviewed the patient's [id number] medications and allergies for accuracy.    The list below reflectives the updated PTA list. Please review each medication in order reconciliation for additional clarification and justification.  Prior to Admission Medications   Prescriptions Last Dose Informant   Trelegy Ellipta 100-62.5-25 mcg blister with device  Self   Sig: Inhale 1 puff once daily as needed.   albuterol 90 mcg/actuation inhaler  Self   Si puffs every 4 hours if needed.      Facility-Administered Medications: None         The list below reflectives the updated allergy list. Please review each documented allergy for additional clarification and justification.  Allergies  Reviewed by Negin López on 10/17/2024   No Known Allergies         Below are additional concerns with the patient's PTA list.  The following updates were made to the Prior to Admission medication list:     Source of Information:     The following updates were made to the Prior to Admission medication list:     Source of Information:     Medications ADDED:   N/a  Medications CHANGED:  N/a  Medications REMOVED:   N/a  Medications NOT TAKING:   N/a    Allergy reviewed : Yes    Comments: spoke to patient        Negin López

## 2024-10-17 NOTE — CARE PLAN
The patient's goals for the shift include      The clinical goals for the shift include pt will remain safe for the duration of the shift      Problem: General Stroke  Goal: Establish a mutual long term goal with patient by discharge  Outcome: Progressing  Goal: Demonstrate improvement in neurological exam throughout the shift  Outcome: Progressing  Goal: Maintain BP within ordered limits throughout shift  Outcome: Progressing  Goal: Participate in treatment (ie., meds, therapy) throughout shift  Outcome: Progressing  Goal: No symptoms of aspiration throughout shift  Outcome: Progressing  Goal: No symptoms of hemorrhage throughout shift  Outcome: Progressing  Goal: Tolerate enteral feeding throughout shift  Outcome: Progressing  Goal: Decreased nausea/vomiting throughout shift  Outcome: Progressing  Goal: Controlled blood glucose throughout shift  Outcome: Progressing  Goal: Out of bed three times today  Outcome: Progressing     Problem: Fall/Injury  Goal: Not fall by end of shift  Outcome: Progressing  Goal: Be free from injury by end of the shift  Outcome: Progressing  Goal: Verbalize understanding of personal risk factors for fall in the hospital  Outcome: Progressing  Goal: Verbalize understanding of risk factor reduction measures to prevent injury from fall in the home  Outcome: Progressing  Goal: Use assistive devices by end of the shift  Outcome: Progressing  Goal: Pace activities to prevent fatigue by end of the shift  Outcome: Progressing     Problem: Skin  Goal: Decreased wound size/increased tissue granulation at next dressing change  Outcome: Progressing  Goal: Participates in plan/prevention/treatment measures  Outcome: Progressing  Goal: Prevent/manage excess moisture  Outcome: Progressing  Goal: Prevent/minimize sheer/friction injuries  Outcome: Progressing  Goal: Promote/optimize nutrition  Outcome: Progressing  Goal: Promote skin healing  Outcome: Progressing

## 2024-10-17 NOTE — PROGRESS NOTES
Physical Therapy    Physical Therapy Evaluation    Patient Name: Heaven Panchal  MRN: [id number]  Department: [place] [id number]  Room: [address] [place] [address]  Today's Date: 10/17/2024   Time Calculation  Start Time: 1035  Stop Time: 1053  Time Calculation (min): 18 min    Assessment/Plan   PT Assessment  PT Assessment Results: Decreased strength, Decreased endurance, Impaired balance, Decreased mobility, Impaired hearing  Rehab Prognosis: Good  Evaluation/Treatment Tolerance: Patient tolerated treatment well  Medical Staff Made Aware: Yes  Strengths: Ability to acquire knowledge, Access to adaptive/assistive products, Attitude of self, Capable of completing ADLs semi/independent, Insight into problems  End of Session Communication: Bedside nurse  Assessment Comment: Pt presenting with intermittent LUE and LLE weakness and concern for CVA now demonstrating deficits in generalized strength, endurance and balance as well as increased pain resulting in impaired functional mobility, gait and self care. Due to the impairments listed above, pt would benefit from skilled physical therapy services in acute care setting as well as additional therapy in LOW intensity setting with 24/7 supervision and assistance  End of Session Patient Position: Alarm on, Bed, 3 rail up  IP OR SWING BED PT PLAN  Inpatient or Swing Bed: Inpatient  PT Plan  Treatment/Interventions: Bed mobility, Transfer training, Gait training, Stair training, Balance training, Neuromuscular re-education, Strengthening, Endurance training, Therapeutic exercise, Therapeutic activity, Home exercise program  PT Plan: Ongoing PT  PT Frequency: 3 times per week  PT Discharge Recommendations: Low intensity level of continued care  Equipment Recommended upon Discharge:  (pt owns FWW)  PT Recommended Transfer Status: Assist x1, Stand by assist  PT - OK to Discharge: Yes (PT POC initiated this date)    Subjective   General Visit Information:  General  Reason for Referral: Pt is a 78 yo female  "presenting with episodic LUE and LLE weaknes, numbness and \"heaviness\" resulting in x1 fall and concern for CVA (negative on imaging during prior hospital admission [place] [facility] [place]  Referred By: MADELINE Smith  Past Medical History Relevant to Rehab: No past medical history on file.    Prior to Session Communication: Bedside nurse  Patient Position Received: Bed, 2 rail up, Alarm on  Preferred Learning Style: auditory, kinesthetic, visual, written  General Comment: Pt agreeable to PT evaluation  Home Living:  Home Living  Type of Home: Mobile home  Lives With: Adult children (Dtr)  Home Adaptive Equipment: Cane, Walker rolling or standard  Home Layout: One level  Home Access: Stairs to enter with rails  Entrance Stairs-Rails: Both  Entrance Stairs-Number of Steps: 5  Bathroom Shower/Tub: Tub/shower unit (shower chair)  Bathroom Toilet: Standard  Prior Level of Function:  Prior Function Per Pt/Caregiver Report  Level of [place]: Independent with ADLs and functional transfers, Independent with homemaking with ambulation  Receives Help From: Family (granddaughter)  ADL Assistance: Independent  Homemaking Assistance: Independent  Ambulatory Assistance: Independent (use of cane or walker prn - primarily SPC)  Vocational: Retired (nurses aide)  Hand Dominance: Right  Prior Function Comments: indep with ADLs, assist in/out of shower, indep with IADLs, orders groceries or granddaughter provides transportation, denies falls  Precautions:  Precautions  Hearing/Visual Limitations: [place]  Medical Precautions: Fall precautions     Vital Signs (Past 2hrs)        Date/Time Vitals Session Patient Position Pulse Resp SpO2 BP MAP (mmHg)    10/17/24 1156 --  --  63  16  99 %  150/68  95                         Objective   Pain:  Pain Assessment  Pain Assessment: 0-10  0-10 (Numeric) Pain Score: 0 - No pain  Cognition:  Cognition  Orientation Level: Oriented X4    General Assessments:  Activity Tolerance  Endurance: " "Tolerates 10 - 20 min exercise with multiple rests    Sensation  Light Touch: No apparent deficits  Sensation Comment: B UE's and LE's WFL, pt reports no deficits at this time    Coordination  Movements are Fluid and Coordinated: Yes    Static Sitting Balance  Static Sitting-Balance Support: No upper extremity supported, Feet supported    Static Standing Balance  Static Standing-Balance Support: Bilateral upper extremity supported (FWW)  Functional Assessments:  Bed Mobility  Bed Mobility: Yes  Bed Mobility 1  Bed Mobility 1: Supine to sitting, Sitting to supine  Level of Assistance 1: Close supervision, Distant supervision    Transfers  Transfer: Yes  Transfer 1  Technique 1: Sit to stand, Stand to sit  Transfer Device 1: Gait belt, Walker  Transfer Level of Assistance 1: Close supervision, Contact guard  Trials/Comments 1: Increased time to complete transition    Ambulation/Gait Training  Ambulation/Gait Training Performed: Yes  Ambulation/Gait Training 1  Surface 1: Level tile  Device 1: Rolling walker  Gait Support Devices: Gait belt  Assistance 1: Close supervision  Quality of Gait 1:  (reduced B step length and gait bear. No LOB noted and pt denies LLE weakness however states \"it feels tight\")  Comments/Distance (ft) 1: 30ft  Extremity/Trunk Assessments:  RUE   RUE : Within Functional Limits  LUE   LUE: Within Functional Limits (Pt reports hemiparetic weakness intermittent and currently not affected)  RLE   RLE : Within Functional Limits  LLE   LLE : Exceptions to WFL (mild weakness in L hip. Pt states not hemiparetic weakness intermittent and currently not affected)  Outcome Measures:  [facility] Basic Mobility  Turning from your back to your side while in a flat bed without using bedrails: None  Moving from lying on your back to sitting on the side of a flat bed without using bedrails: A little  Moving to and from bed to chair (including a wheelchair): A little  Standing up from a chair using your arms (e.g. " wheelchair or bedside chair): A little  To walk in hospital room: A little  Climbing 3-5 steps with railing: A little  Basic Mobility - Total Score: 19    Encounter Problems       Encounter Problems (Active)       Balance       LTG - Patient will tolerate standing       Start:  10/17/24    Expected End:  10/31/24       Pt will complete MENESES balance test and score >40/56 demonstrating low risk of falls            Mobility       LTG - Patient will navigate 4-6 steps with rails/device       Start:  10/17/24    Expected End:  10/31/24       SBA using BHRs         STG - Patient will ambulate       Start:  10/17/24    Expected End:  10/31/24       Mod Ind using FWW >100ft            PT Transfers       STG - Patient will perform bed mobility       Start:  10/17/24    Expected End:  10/31/24       Mod Ind         STG - Patient will transfer sit to and from stand       Start:  10/17/24    Expected End:  10/31/24       Mod Ind                Education Documentation  Precautions, taught by Juan Barger, PT at 10/17/2024 12:49 PM.  Learner: Patient  Readiness: Acceptance  Method: Explanation  Response: Verbalizes Understanding    Body Mechanics, taught by Juan Barger, PT at 10/17/2024 12:49 PM.  Learner: Patient  Readiness: Acceptance  Method: Explanation  Response: Verbalizes Understanding    Mobility Training, taught by Juan Barger, PT at 10/17/2024 12:49 PM.  Learner: Patient  Readiness: Acceptance  Method: Explanation  Response: Verbalizes Understanding    Education Comments  No comments found.

## 2024-10-17 NOTE — PROGRESS NOTES
Occupational Therapy    Evaluation/Treatment    Patient Name: Heaven Panchal  MRN: [id number]  Department: [place] [address]  Room: [address] [place] [address]  Today's Date: 10/17/24  Time Calculation  Start Time: 0916  Stop Time: 0949  Time Calculation (min): 33 min       Assessment:  OT Assessment: Pt appears at baseline for ADLs, pt educated in use of AD for functional ambulation for safety and fall prevention. OT will sign off at this time due to no skilled needs.  Prognosis: Good  Barriers to Discharge: None  Evaluation/Treatment Tolerance: Patient tolerated treatment well  Medical Staff Made Aware: Yes  End of Session Communication: Bedside nurse  End of Session Patient Position: Bed, 2 rail up, Alarm on  OT Assessment Results: Decreased functional mobility  Prognosis: Good  Barriers to Discharge: None  Evaluation/Treatment Tolerance: Patient tolerated treatment well  Medical Staff Made Aware: Yes  Strengths: Ability to acquire knowledge, Access to adaptive/assistive products, Attitude of self, Capable of completing ADLs semi/independent, Insight into problems  Barriers to Participation:  (none)  Plan:  Treatment Interventions: ADL retraining, UE strengthening/ROM, Endurance training, Compensatory technique education  No Skilled OT: At baseline function  OT Frequency: OT eval only  OT Discharge Recommendations: No further acute OT, Low intensity level of continued care, No OT needed after discharge  Equipment Recommended upon Discharge:  (pt has equipment)  OT Recommended Transfer Status: Assist of 1 (assist of 1/ SBA with AD)  OT - OK to Discharge: Yes  Treatment Interventions: ADL retraining, UE strengthening/ROM, Endurance training, Compensatory technique education    Subjective   Current Problem:  1. Acute CVA (cerebrovascular accident) (Multi)  Transthoracic Echo (TTE) Complete    Transthoracic Echo (TTE) Complete    CANCELED: Transthoracic Echo (TTE) Complete    CANCELED: Transthoracic Echo (TTE) Complete        General:   OT  Received On: 10/17/24  General  Reason for Referral: CVA  Referred By: MEHRAN Smith-CNP  Past Medical History Relevant to Rehab: No past medical history on file.    Prior to Session Communication: Bedside nurse  Patient Position Received: Bed, 2 rail up, Alarm off, not on at start of session  Preferred Learning Style: auditory, kinesthetic, visual, written  General Comment: Pt agreeable to OT  Precautions:  Hearing/Visual Limitations: [place]  Medical Precautions: Fall precautions    Pain:  Pain Assessment  Pain Assessment: 0-10  0-10 (Numeric) Pain Score: 0 - No pain    Objective   Cognition:  Orientation Level: Oriented X4        Home Living:  Type of Home: Mobile home  Lives With: Adult children (Dtr)  Home Adaptive Equipment: Cane, Walker rolling or standard  Home Layout: One level  Home Access: Stairs to enter with rails  Entrance Stairs-Rails: Both  Entrance Stairs-Number of Steps: 5  Bathroom Shower/Tub: Tub/shower unit (shower chair)  Bathroom Toilet: Standard  Prior Function:  Level of [place]: Independent with ADLs and functional transfers, Independent with homemaking with ambulation  Receives Help From: Family (granddaughter)  ADL Assistance: Independent  Homemaking Assistance: Independent  Ambulatory Assistance: Independent (use of cane or walker prn)  Vocational: Retired (nurses aide)  Hand Dominance: Right  Prior Function Comments: indep with ADLs, assist in/out of shower, indep with IADLs, orders groceries or granddaughter provides transportation, denies falls     ADL:  UE Dressing Assistance: Stand by  Toileting Assistance with Device: Stand by  Functional Assistance:  (CGA in standing)  Activities of Daily Living: Grooming  Grooming Level of Assistance: Close supervision  Grooming Where Assessed: Standing sinkside  Grooming Comments: pt stood at sink to complete grooming tasks, SUP for safety    UE Dressing  UE Dressing Level of Assistance: Setup  UE Dressing Where Assessed: Edge of bed  UE  "Dressing Comments: setup assist to obtain gown, pt able to thread B UE's into gown, good balance throughout    Toileting  Toileting Level of Assistance: Contact guard, Distant supervision  Where Assessed: Toilet  Toileting Comments: SUP while seated on toilet, CGA while standing to pull up briefs over hips, pt with good safety awareness with R UE on grab bar  Activity Tolerance:  Endurance: Tolerates 10 - 20 min exercise with multiple rests  Functional Standing Tolerance:     Bed Mobility/Transfers: Bed Mobility  Bed Mobility: Yes  Bed Mobility 1  Bed Mobility 1: Supine to sitting, Sitting to supine  Level of Assistance 1: Close supervision, Distant supervision  Bed Mobility Comments 1: SUP for safety    Transfers  Transfer: Yes  Transfer 1  Technique 1: Sit to stand, Stand to sit  Transfer Level of Assistance 1: Close supervision, Contact guard  Trials/Comments 1: SUP-CGA, pt with flexed posture upon standing, able to correct, CGA for safety      Functional Mobility:  Functional Mobility  Functional Mobility Performed: Yes  Functional Mobility 1  Surface 1: Level tile  Device 1: No device  Assistance 1: Contact guard  Comments 1: CGA for safety, pt reaching for objects and grab bar for UE support, pt educated in use of AD for stability but stating she is \"fine\"; min household distance bed<>bathroom  Sitting Balance:  Static Sitting Balance  Static Sitting-Balance Support: Feet unsupported  Static Sitting-Level of Assistance: Independent  Dynamic Sitting Balance  Dynamic Sitting-Balance Support: Feet supported  Dynamic Sitting-Level of Assistance: Independent  Dynamic Sitting-Balance: Lateral lean, Forward lean  Standing Balance:  Static Standing Balance  Static Standing-Balance Support: Right upper extremity supported  Static Standing-Level of Assistance: Contact guard  Dynamic Standing Balance  Dynamic Standing-Balance Support: Right upper extremity supported  Dynamic Standing-Level of Assistance: Contact " guard  Dynamic Standing-Balance: Forward lean  Dynamic Standing-Comments: good balance standing at the toilet and sink for ADLs, CGA for safety    Vision:Vision - Basic Assessment  Current Vision: No visual deficits  Sensation:  Light Touch: No apparent deficits  Sharp/Dull: No apparent deficits  Sensation Comment: ALEXIS LOVELACE's and STARR's WFL, pt reports no deficits at this time  Strength:  Strength Comments: ALEXIS LOVELACE's 4/5 MMT     Perception:  Inattention/Neglect: Appears intact  Initiation: Appears intact  Coordination:  Movements are Fluid and Coordinated: Yes   Hand Function:  Hand Function  Gross Grasp: Functional  Coordination: Functional  Extremities: RUE   RUE : Within Functional Limits (shoulder flexion above 90 degrees) and LUE   LUE: Within Functional Limits (shoulder flexion above 90 degrees)    Outcome Measures: [facility] Daily Activity  Putting on and taking off regular lower body clothing: None  Bathing (including washing, rinsing, drying): None  Putting on and taking off regular upper body clothing: None  Toileting, which includes using toilet, bedpan or urinal: A little  Taking care of personal grooming such as brushing teeth: None  Eating Meals: None  Daily Activity - Total Score: 23      Education Documentation  Body Mechanics, taught by Shani Hines OT at 10/17/2024 10:16 AM.  Learner: Patient  Readiness: Acceptance  Method: Explanation, Demonstration  Response: Verbalizes Understanding, Demonstrated Understanding    ADL Training, taught by Shani Hines OT at 10/17/2024 10:16 AM.  Learner: Patient  Readiness: Acceptance  Method: Explanation, Demonstration  Response: Verbalizes Understanding, Demonstrated Understanding    Education Comments  No comments found.

## 2024-10-17 NOTE — CONSULTS
INITIAL NEUROLOGY CONSULT NOTE    IMPRESSION: Acute and recurrent episodes of left hemiparesis, without focal pathology on brain imaging including MRI.  Possibilities include TIA, simple partial seizure, presyncope (less likely, occurred with only one episode, or complicated migraine (unlikely given lack of prior history of migraine and lack of headaches at the time of the presentation.    RECOMMENDATIONS:  ECASA 81 mg daily for stroke prophylaxis.  Clopidogrel 75 mg daily for additional stroke prophylaxis for 21 days, then ASA alone.  Atorvastatin for adjunct stroke prophylaxis.  Echo has been completed and result is pending.  I added an EEG to rule out obvious interictal epileptiform activity.  Needs a set of orthostatics.  Discussed smoking cessation.  If no actionable pathology on the testing, can consider discharge planning from the neurological standpoint.    Uriel Dennison Jr., M.D., FAAN       History Of Present Illness  Heaven Panchal is a 79 y.o. female presenting with Left hemiparesis.  History comes from the patient and from EMR review.    The patient reports sudden onset of left hemiparesis and hemisensory deficit early yesterday morning, in which she couldn't lift her left leg, lasting about ten minutes.,  she had a second episode around 0800 yesterday morning whil in her kitchen, causing her to fall. EMS was summoned and she was taken to the [facility] ED.  She had two more episodes while in the ED.  Initial cranial CT and cranial/cervical CTA revealed no acute pathology.  Cranial MRI revealed no acute pathology.  She was started on ASA and clopidogrel, then [place] [facility] [place] lack of [place] coverage at the referring facility.    Past Medical History  COPD, asthma, hearing loss, tobacco abuse, right hip fracture  Surgical History  Right hip replacement last year  Social History  Tobacco abuse (current use)  Alcohol abuse (sober 52 years)  No drugs    Scheduled  "medications  aspirin, 81 mg, oral, Daily  atorvastatin, 40 mg, oral, Nightly  budesonide, 0.5 mg, nebulization, BID  clopidogrel, 75 mg, oral, Daily  enoxaparin, 40 mg, subcutaneous, q24h  formoterol, 20 mcg, nebulization, BID  perflutren lipid microspheres, 2 mL of dilution, intravenous, Once in imaging  tiotropium, 2 puff, inhalation, Daily      Continuous medications     PRN medications  PRN medications: acetaminophen **OR** acetaminophen **OR** acetaminophen, oxygen      No family history on file.  Allergies  Patient has no known allergies.  No medications prior to admission.       Scheduled medications  aspirin, 81 mg, oral, Daily  atorvastatin, 40 mg, oral, Nightly  budesonide, 0.5 mg, nebulization, BID  clopidogrel, 75 mg, oral, Daily  enoxaparin, 40 mg, subcutaneous, q24h  formoterol, 20 mcg, nebulization, BID  perflutren lipid microspheres, 2 mL of dilution, intravenous, Once in imaging  tiotropium, 2 puff, inhalation, Daily      Continuous medications     PRN medications  PRN medications: acetaminophen **OR** acetaminophen **OR** acetaminophen, oxygen    Review of Systems    Last Recorded Vitals  Blood pressure 145/75, pulse 51, temperature 37 °C (98.6 °F), temperature source Temporal, resp. rate 16, height 1.676 m (5' 6\"), weight 76.4 kg (168 lb 6.9 oz), SpO2 96%.    CONSTITUTIONAL:  No acute distress    CARDIOVASCULAR:  Normal pulses in the distal legs, no edema of either arm or either leg.  No carotid bruits.    MENTAL STATUS:  Awake, alert, fully oriented to self, place, and time, with present short-term memory, good awareness of recent events, normal attention span, concentration, and fund of knowledge.    SPEECH AND LANGUAGE:  Can name and repeat, follows all commands, has no dysarthria    FUNDOSCOPIC:  No papilledema    CRANIAL NERVES:  II-Vision present, visual fields full to confrontational testing    III/IV/VI--EOMs are present in all directions.  Pupils are symmetrically reactive in dim light.  " No ptosis.    V--Normal facial sensation.    VII--No facial asymmetry.    VIII--Severe bilateral hearing loss worse in the left ear; I have to speak loudly to make myself heard.    IX/X--Symmetric soft palate rise.    XI--Normal trapezius power bilaterally.    XII--Tongue protrudes without deviation.    MOTOR:  Normal power, tone, and bulk in both arms and both legs.    SENSORY:  Normal pin sensation in both arms and both legs without distal-proximal gradient, asymmetry, or spinal sensory level.    COORDINATION:  Normal finger-to-nose and heel-to-shin testing in both arms and both legs.    REFLEXES are normal and symmetric at the biceps, triceps, brachioradialis, patella, and ankle.  The plantar responses are flexor.    GAIT is antalgic due to joint discomfort, but otherwise normal, without steppage, ataxia, shuffling, or spasticity.    NIHSS=0    Relevant Results  Results for orders placed or performed during the hospital encounter of 10/16/24 (from the past 24 hours)   POCT GLUCOSE   Result Value Ref Range    POCT Glucose 93 74 - 99 mg/dL   CBC   Result Value Ref Range    WBC 9.9 4.4 - 11.3 x10*3/uL    nRBC 0.0 0.0 - 0.0 /100 WBCs    RBC 4.66 4.00 - 5.20 x10*6/uL    Hemoglobin 13.8 12.0 - 16.0 g/dL    Hematocrit 42.1 36.0 - 46.0 %    MCV 90 80 - 100 fL    MCH 29.6 26.0 - 34.0 pg    MCHC 32.8 32.0 - 36.0 g/dL    RDW 14.0 11.5 - 14.5 %    Platelets 191 150 - 450 x10*3/uL   CBC and Auto Differential   Result Value Ref Range    WBC 9.0 4.4 - 11.3 x10*3/uL    nRBC 0.0 0.0 - 0.0 /100 WBCs    RBC 4.61 4.00 - 5.20 x10*6/uL    Hemoglobin 13.1 12.0 - 16.0 g/dL    Hematocrit 42.2 36.0 - 46.0 %    MCV 92 80 - 100 fL    MCH 28.4 26.0 - 34.0 pg    MCHC 31.0 (L) 32.0 - 36.0 g/dL    RDW 14.2 11.5 - 14.5 %    Platelets 191 150 - 450 x10*3/uL    Neutrophils % 46.4 40.0 - 80.0 %    Immature Granulocytes %, Automated 0.2 0.0 - 0.9 %    Lymphocytes % 35.9 13.0 - 44.0 %    Monocytes % 6.0 2.0 - 10.0 %    Eosinophils % 10.1 0.0 - 6.0 %     Basophils % 1.4 0.0 - 2.0 %    Neutrophils Absolute 4.15 1.60 - 5.50 x10*3/uL    Immature Granulocytes Absolute, Automated 0.02 0.00 - 0.50 x10*3/uL    Lymphocytes Absolute 3.22 (H) 0.80 - 3.00 x10*3/uL    Monocytes Absolute 0.54 0.05 - 0.80 x10*3/uL    Eosinophils Absolute 0.91 (H) 0.00 - 0.40 x10*3/uL    Basophils Absolute 0.13 (H) 0.00 - 0.10 x10*3/uL   Basic metabolic panel   Result Value Ref Range    Glucose 86 74 - 99 mg/dL    Sodium 139 136 - 145 mmol/L    Potassium 4.1 3.5 - 5.3 mmol/L    Chloride 104 98 - 107 mmol/L    Bicarbonate 29 21 - 32 mmol/L    Anion Gap 10 10 - 20 mmol/L    Urea Nitrogen 14 6 - 23 mg/dL    Creatinine 0.63 0.50 - 1.05 mg/dL    eGFR >90 >60 mL/min/1.73m*2    Calcium 8.5 (L) 8.6 - 10.3 mg/dL   Lipid Panel   Result Value Ref Range    Cholesterol 191 0 - 199 mg/dL    HDL-Cholesterol 38.8 mg/dL    Cholesterol/HDL Ratio 4.9     LDL Calculated 131 (H) <=99 mg/dL    VLDL 21 0 - 40 mg/dL    Triglycerides 106 0 - 149 mg/dL    Non HDL Cholesterol 152 (H) 0 - 149 mg/dL   Hemoglobin A1C   Result Value Ref Range    Hemoglobin A1C 5.6 See comment %    Estimated Average Glucose 114 Not Established mg/dL   B-Type Natriuretic Peptide   Result Value Ref Range    BNP 92 0 - 99 pg/mL   Transthoracic Echo (TTE) Complete   Result Value Ref Range    BSA 1.89 m2         I have personally reviewed the following imaging results:    No films available.  Reported cranial CT from [facility] without acute pathology, and cranial/cervical CTA without acute pathology.  Cranial MRI at [place] also revealed no acute pathology.      Uriel Dennison Jr., M.D., FAAN

## 2024-10-17 NOTE — CARE PLAN
Problem: General Stroke  Goal: Establish a mutual long term goal with patient by discharge  Outcome: Progressing  Goal: Demonstrate improvement in neurological exam throughout the shift  Outcome: Progressing  Goal: Maintain BP within ordered limits throughout shift  Outcome: Progressing  Goal: Participate in treatment (ie., meds, therapy) throughout shift  Outcome: Progressing  Goal: No symptoms of aspiration throughout shift  Outcome: Progressing  Goal: No symptoms of hemorrhage throughout shift  Outcome: Progressing  Goal: Tolerate enteral feeding throughout shift  Outcome: Progressing  Goal: Decreased nausea/vomiting throughout shift  Outcome: Progressing  Goal: Controlled blood glucose throughout shift  Outcome: Progressing  Goal: Out of bed three times today  Outcome: Progressing   The patient's goals for the shift include      The clinical goals for the shift include pt will remain safe for the duration of the shift

## 2024-10-17 NOTE — H&P
History Of Present Illness  Heaven Panchal is a 79 y.o. female with past medical history of asthma/chronic respiratory failure, likely COPD, on home O2 2 L, hard of hearing, half pack a day smoker, presenting as a transfer from [facility] emergency department.  This morning around 1 AM patient got up and went to the restroom.  When she got back to her room she attempted to lift her left leg back into bed and began experiencing left leg and left arm heaviness.  Patient reports that the episode lasted 5 to 10 minutes.  She then awakened at 8 AM made breakfast and afterwards she experienced a left arm and leg heaviness causing her to fall onto the kitchen floor.  Patient reports dizziness and lightheadedness during both episodes.  When patient arrived to [facility] patient states that she had another 3-4 episodes of the symptoms.  CT head was completed with no acute intracranial hemorrhage or mass effect, moderate changes of micro angiopathy, CTA neck with no significant stenosis of cervical carotid and vertebral arteries, CT head with no significant stenosis of the major intracranial arteries, also showed right mastoid effusion with opacification of bilateral middle ear cavities correlate with otomastoiditis.  MRI brain with no evidence of acute ischemia.  Sodium 137, potassium 4.7, BUN 16, creatinine 0.7, blood glucose 89, INR 0.9, CBC not collected will order here, troponin 13.  Patient currently moving all extremities, no focal weakness.  Patient is without fever, chills, chest pain, shortness of breath, nausea, vomiting or diarrhea.  Patient was loaded with Plavix and given 324 of aspirin over at [place].  Past Medical History  She has no past medical history on file.    Surgical History  She has no past surgical history on file.     Social History  She has no history on file for tobacco use, alcohol use, and drug use.    Family History  No family history on file.      Allergies  Patient has no allergy information on record.    Review of Systems   Constitutional: Negative.    Respiratory: Negative.     Cardiovascular: Negative.    Gastrointestinal: Negative.    Genitourinary: Negative.    Skin: Negative.         Physical Exam  HENT:      Head: Normocephalic.      Mouth/Throat:      Mouth: Mucous membranes are moist.   Cardiovascular:      Rate and Rhythm: Regular rhythm.      Heart sounds: Normal heart sounds.   Pulmonary:      Effort: Pulmonary effort is normal.      Breath sounds: Normal breath sounds.   Abdominal:      General: Bowel sounds are normal.      Palpations: Abdomen is soft.   Musculoskeletal:         General: Normal range of motion.      Cervical back: Neck supple.   Skin:     General: Skin is warm.   Neurological:      Mental Status: She is alert and oriented to person, place, and time.   Psychiatric:         Behavior: Behavior normal.          Last Recorded Vitals  /84 (BP Location: Right arm, Patient Position: Lying)   Pulse 64   Temp 36 °C (96.8 °F) (Temporal)   Resp 18   Wt 73.7 kg (162 lb 8 oz)   SpO2 98%     Relevant Results        Results for orders placed or performed during the hospital encounter of 10/16/24 (from the past 24 hours)   POCT GLUCOSE   Result Value Ref Range    POCT Glucose 93 74 - 99 mg/dL   CBC   Result Value Ref Range    WBC 9.9 4.4 - 11.3 x10*3/uL    nRBC 0.0 0.0 - 0.0 /100 WBCs    RBC 4.66 4.00 - 5.20 x10*6/uL    Hemoglobin 13.8 12.0 - 16.0 g/dL    Hematocrit 42.1 36.0 - 46.0 %    MCV 90 80 - 100 fL    MCH 29.6 26.0 - 34.0 pg    MCHC 32.8 32.0 - 36.0 g/dL    RDW 14.0 11.5 - 14.5 %    Platelets 191 150 - 450 x10*3/uL          Assessment/Plan   Assessment & Plan  Acute CVA (cerebrovascular accident) (Multi)    Plan:  Stroke protocol  Consult neurology   Continue aspirin Plavix, atorvastatin 40 mg daily  Echocardiogram with bubble study      Asthma/chronic respiratory failure  -Continue home respiratory medications      DVT  prophylaxis-Lovenox     Hever Hernandez, APRN-CNP

## 2024-10-18 LAB
ANION GAP SERPL CALC-SCNC: 11 MMOL/L (ref 10–20)
BUN SERPL-MCNC: 17 MG/DL (ref 6–23)
CALCIUM SERPL-MCNC: 8.3 MG/DL (ref 8.6–10.3)
CHLORIDE SERPL-SCNC: 105 MMOL/L (ref 98–107)
CO2 SERPL-SCNC: 26 MMOL/L (ref 21–32)
CREAT SERPL-MCNC: 0.62 MG/DL (ref 0.5–1.05)
EGFRCR SERPLBLD CKD-EPI 2021: >90 ML/MIN/1.73M*2
ERYTHROCYTE [DISTWIDTH] IN BLOOD BY AUTOMATED COUNT: 14.1 % (ref 11.5–14.5)
GLUCOSE BLD MANUAL STRIP-MCNC: 105 MG/DL (ref 74–99)
GLUCOSE BLD MANUAL STRIP-MCNC: 113 MG/DL (ref 74–99)
GLUCOSE BLD MANUAL STRIP-MCNC: 86 MG/DL (ref 74–99)
GLUCOSE BLD MANUAL STRIP-MCNC: 97 MG/DL (ref 74–99)
GLUCOSE SERPL-MCNC: 101 MG/DL (ref 74–99)
HCT VFR BLD AUTO: 40.2 % (ref 36–46)
HGB BLD-MCNC: 12.8 G/DL (ref 12–16)
MCH RBC QN AUTO: 29.4 PG (ref 26–34)
MCHC RBC AUTO-ENTMCNC: 31.8 G/DL (ref 32–36)
MCV RBC AUTO: 92 FL (ref 80–100)
NRBC BLD-RTO: 0 /100 WBCS (ref 0–0)
PLATELET # BLD AUTO: 162 X10*3/UL (ref 150–450)
POTASSIUM SERPL-SCNC: 4.2 MMOL/L (ref 3.5–5.3)
RBC # BLD AUTO: 4.36 X10*6/UL (ref 4–5.2)
SODIUM SERPL-SCNC: 138 MMOL/L (ref 136–145)
WBC # BLD AUTO: 9.5 X10*3/UL (ref 4.4–11.3)

## 2024-10-18 PROCEDURE — 2500000002 HC RX 250 W HCPCS SELF ADMINISTERED DRUGS (ALT 637 FOR MEDICARE OP, ALT 636 FOR OP/ED)

## 2024-10-18 PROCEDURE — 2500000004 HC RX 250 GENERAL PHARMACY W/ HCPCS (ALT 636 FOR OP/ED): Performed by: INTERNAL MEDICINE

## 2024-10-18 PROCEDURE — 2060000001 HC INTERMEDIATE ICU ROOM DAILY

## 2024-10-18 PROCEDURE — 94640 AIRWAY INHALATION TREATMENT: CPT

## 2024-10-18 PROCEDURE — 2500000001 HC RX 250 WO HCPCS SELF ADMINISTERED DRUGS (ALT 637 FOR MEDICARE OP): Performed by: NURSE PRACTITIONER

## 2024-10-18 PROCEDURE — 82374 ASSAY BLOOD CARBON DIOXIDE: CPT | Performed by: INTERNAL MEDICINE

## 2024-10-18 PROCEDURE — 97530 THERAPEUTIC ACTIVITIES: CPT | Mod: GP,CQ

## 2024-10-18 PROCEDURE — 2500000005 HC RX 250 GENERAL PHARMACY W/O HCPCS: Performed by: NURSE PRACTITIONER

## 2024-10-18 PROCEDURE — G0378 HOSPITAL OBSERVATION PER HR: HCPCS

## 2024-10-18 PROCEDURE — 85027 COMPLETE CBC AUTOMATED: CPT | Performed by: INTERNAL MEDICINE

## 2024-10-18 PROCEDURE — 97116 GAIT TRAINING THERAPY: CPT | Mod: GP,CQ

## 2024-10-18 PROCEDURE — 99232 SBSQ HOSP IP/OBS MODERATE 35: CPT | Performed by: INTERNAL MEDICINE

## 2024-10-18 PROCEDURE — 82947 ASSAY GLUCOSE BLOOD QUANT: CPT

## 2024-10-18 PROCEDURE — 2500000004 HC RX 250 GENERAL PHARMACY W/ HCPCS (ALT 636 FOR OP/ED): Performed by: NURSE PRACTITIONER

## 2024-10-18 PROCEDURE — 80048 BASIC METABOLIC PNL TOTAL CA: CPT | Performed by: INTERNAL MEDICINE

## 2024-10-18 PROCEDURE — 36415 COLL VENOUS BLD VENIPUNCTURE: CPT | Performed by: INTERNAL MEDICINE

## 2024-10-18 RX ORDER — POLYETHYLENE GLYCOL 3350 17 G/17G
17 POWDER, FOR SOLUTION ORAL DAILY
Status: DISCONTINUED | OUTPATIENT
Start: 2024-10-18 | End: 2024-10-19 | Stop reason: HOSPADM

## 2024-10-18 RX ADMIN — BUDESONIDE 0.5 MG: 0.5 INHALANT ORAL at 07:32

## 2024-10-18 RX ADMIN — ENOXAPARIN SODIUM 40 MG: 40 INJECTION SUBCUTANEOUS at 20:22

## 2024-10-18 RX ADMIN — Medication 2 L/MIN: at 08:47

## 2024-10-18 RX ADMIN — Medication 2 L/MIN: at 20:00

## 2024-10-18 RX ADMIN — TIOTROPIUM BROMIDE INHALATION SPRAY 2 PUFF: 3.12 SPRAY, METERED RESPIRATORY (INHALATION) at 07:32

## 2024-10-18 RX ADMIN — ATORVASTATIN CALCIUM 40 MG: 40 TABLET ORAL at 20:22

## 2024-10-18 RX ADMIN — CLOPIDOGREL 75 MG: 75 TABLET ORAL at 08:13

## 2024-10-18 RX ADMIN — BUDESONIDE 0.5 MG: 0.5 INHALANT ORAL at 20:00

## 2024-10-18 RX ADMIN — FORMOTEROL FUMARATE DIHYDRATE 20 MCG: 20 SOLUTION RESPIRATORY (INHALATION) at 07:32

## 2024-10-18 RX ADMIN — ASPIRIN 81 MG 81 MG: 81 TABLET ORAL at 08:13

## 2024-10-18 RX ADMIN — FORMOTEROL FUMARATE DIHYDRATE 20 MCG: 20 SOLUTION RESPIRATORY (INHALATION) at 20:00

## 2024-10-18 RX ADMIN — POLYETHYLENE GLYCOL 3350 17 G: 17 POWDER, FOR SOLUTION ORAL at 12:26

## 2024-10-18 ASSESSMENT — COGNITIVE AND FUNCTIONAL STATUS - GENERAL
MOVING FROM LYING ON BACK TO SITTING ON SIDE OF FLAT BED WITH BEDRAILS: A LITTLE
STANDING UP FROM CHAIR USING ARMS: A LITTLE
TOILETING: A LITTLE
MOVING FROM LYING ON BACK TO SITTING ON SIDE OF FLAT BED WITH BEDRAILS: A LITTLE
TURNING FROM BACK TO SIDE WHILE IN FLAT BAD: A LITTLE
DAILY ACTIVITIY SCORE: 20
MOVING TO AND FROM BED TO CHAIR: A LITTLE
WALKING IN HOSPITAL ROOM: A LITTLE
MOBILITY SCORE: 18
DRESSING REGULAR LOWER BODY CLOTHING: A LITTLE
DRESSING REGULAR UPPER BODY CLOTHING: A LITTLE
HELP NEEDED FOR BATHING: A LITTLE
CLIMB 3 TO 5 STEPS WITH RAILING: A LITTLE
CLIMB 3 TO 5 STEPS WITH RAILING: A LITTLE
MOVING TO AND FROM BED TO CHAIR: A LITTLE
MOBILITY SCORE: 18
TURNING FROM BACK TO SIDE WHILE IN FLAT BAD: A LITTLE
STANDING UP FROM CHAIR USING ARMS: A LITTLE
WALKING IN HOSPITAL ROOM: A LITTLE

## 2024-10-18 ASSESSMENT — PAIN - FUNCTIONAL ASSESSMENT
PAIN_FUNCTIONAL_ASSESSMENT: 0-10

## 2024-10-18 ASSESSMENT — PAIN SCALES - GENERAL
PAINLEVEL_OUTOF10: 0 - NO PAIN

## 2024-10-18 ASSESSMENT — ACTIVITIES OF DAILY LIVING (ADL): LACK_OF_TRANSPORTATION: NO

## 2024-10-18 NOTE — PROGRESS NOTES
"Physical Therapy    Physical Therapy Treatment    Patient Name: Heaven Panchal  MRN: [id number]  Department: [facility]  Room: [address] [place], [place] [address]  Today's Date: 10/18/2024  Time Calculation  Start Time: 1551  Stop Time: 1616  Time Calculation (min): 25 min         Assessment/Plan   PT Assessment  End of Session Communication: Bedside nurse  Assessment Comment: Pt performed stair training, gait training, and therapeutic activities at today's visit  End of Session Patient Position: Alarm on, Bed, 3 rail up  PT Plan  Inpatient/Swing Bed or Outpatient: Inpatient  PT Plan  Treatment/Interventions: Bed mobility, Transfer training, Gait training  PT Plan: Ongoing PT  PT Frequency: 3 times per week  PT Discharge Recommendations: Low intensity level of continued care  Equipment Recommended upon Discharge:  (pt owns FWW)  PT Recommended Transfer Status: Assist x1  PT - OK to Discharge: Yes (per POC)      General Visit Information:   PT  Visit  PT Received On: 10/18/24  General  Reason for Referral: Pt is a 78 yo female presenting with episodic LUE and LLE weaknes, numbness and \"heaviness\" resulting in x1 fall and concern for CVA (negative on imaging during prior hospital admission before transfer to [facility])  Referred By: MADELINE Smith  Missed Visit: Yes  Missed Visit Reason: Other (Comment) (RN currently doing toileting with patient)  Prior to Session Communication: Bedside nurse  Patient Position Received: Bed, 3 rail up, Alarm on  Preferred Learning Style: auditory, kinesthetic, visual, written  General Comment: Pt supine in bed upon arrival, agreeable tx    Subjective   Precautions:  Precautions  Hearing/Visual Limitations: [place]  Medical Precautions: Fall precautions    Vital Signs (Past 2hrs)        Date/Time Vitals Session Patient Position Pulse Resp SpO2 BP MAP (mmHg)    10/18/24 1519 --  --  72  --  --  --  --     10/18/24 1542 --  --  68  16  97 %  126/72  90     10/18/24 1551 --  --  72  --  --  --  --                    "    Cognition:  Cognition  Orientation Level: Oriented X4    Treatments:        Bed Mobility  Bed Mobility: Yes  Bed Mobility 1  Bed Mobility 1: Supine to sitting, Sitting to supine  Level of Assistance 1: Close supervision  Bed Mobility Comments 1: Pt performed with cues given for hand placement    Ambulation/Gait Training  Ambulation/Gait Training Performed: Yes  Ambulation/Gait Training 1  Surface 1: Level tile  Device 1: Rolling walker  Gait Support Devices: Gait belt  Assistance 1: Contact guard  Quality of Gait 1: Decreased step length  Comments/Distance (ft) 1: Pt ambulated [id number] with cues given for upright posture, forward gaze, and for heel strike.  Transfers  Transfer: Yes  Transfer 1  Technique 1: Sit to stand, Stand to sit  Transfer Device 1: Gait belt, Walker  Transfer Level of Assistance 1: Contact guard  Trials/Comments 1: Pt performed with cues given for hand placement before sitting and standing.    Stairs  Stairs: Yes  Stairs 2  Rails 2: Bilateral  Support Devices 2: Gait belt  Assistance 2: Contact guard  Comment/Number of Steps 2: Pt asc/desc 4 stairs with a step to pattern and cues given for proper sequencing.    Outcome Measures:  [facility] Basic Mobility  Turning from your back to your side while in a flat bed without using bedrails: A little  Moving from lying on your back to sitting on the side of a flat bed without using bedrails: A little  Moving to and from bed to chair (including a wheelchair): A little  Standing up from a chair using your arms (e.g. wheelchair or bedside chair): A little  To walk in hospital room: A little  Climbing 3-5 steps with railing: A little  Basic Mobility - Total Score: 18    Education Documentation  Precautions, taught by Yaquelin Kwok PTA at 10/18/2024  4:26 PM.  Learner: Patient  Readiness: Acceptance  Method: Explanation  Response: Verbalizes Understanding    Body Mechanics, taught by Yaquelin Kwok PTA at 10/18/2024  4:26 PM.  Learner: Patient  Readiness:  Acceptance  Method: Explanation  Response: Verbalizes Understanding    Mobility Training, taught by Yaquelin Kwok PTA at 10/18/2024  4:26 PM.  Learner: Patient  Readiness: Acceptance  Method: Explanation  Response: Verbalizes Understanding    Education Comments  No comments found.        OP EDUCATION:  Outpatient Education  Education Comment: educated on importance of OOB activity    Encounter Problems       Encounter Problems (Active)       Balance       LTG - Patient will tolerate standing       Start:  10/17/24    Expected End:  10/31/24       Pt will complete MENESES balance test and score >40/56 demonstrating low risk of falls            Mobility       LTG - Patient will navigate 4-6 steps with rails/device (Progressing)       Start:  10/17/24    Expected End:  10/31/24       SBA using BHRs         STG - Patient will ambulate (Progressing)       Start:  10/17/24    Expected End:  10/31/24       Mod Ind using FWW >100ft            PT Transfers       STG - Patient will perform bed mobility (Progressing)       Start:  10/17/24    Expected End:  10/31/24       Mod Ind         STG - Patient will transfer sit to and from stand (Progressing)       Start:  10/17/24    Expected End:  10/31/24       Mod Ind

## 2024-10-18 NOTE — PROGRESS NOTES
Heaven Quiroz is a 79 y.o. female on day 1 of admission presenting with Acute CVA (cerebrovascular accident) (Multi).      Subjective   Patient was seen and examined bedside this morning, was hard of hearing, however very conversational and pleasant, stated that her symptoms are nearly resolved, did not have any symptom at that time.       Objective     Last Recorded Vitals  /65 (BP Location: Right arm, Patient Position: Lying)   Pulse 62   Temp 36.4 °C (97.5 °F) (Temporal)   Resp 16   Wt 76.4 kg (168 lb 6.9 oz)   SpO2 97%   Intake/Output last 3 Shifts:  No intake or output data in the 24 hours ending 10/17/24 2113    Admission Weight  Weight: 73.7 kg (162 lb 8 oz) (10/16/24 1910)    Daily Weight  10/17/24 : 76.4 kg (168 lb 6.9 oz)    Image Results  Transthoracic Echo (TTE) Complete     [facility], [address] [place] [address]               Tel [phone] and Fax [phone]    TRANSTHORACIC ECHOCARDIOGRAM REPORT       Patient Name:      HEAVEN QUIROZ       Reading Physician:    [id number] Jamaal Denton DO  Study Date:        10/17/2024           Ordering Provider:    [id number] ADAMA LOVE  MRN/PID:           [id number]             Fellow:  Accession#:        [id number]         Nurse:  Date of Birth/Age: 1945 / 79 years Sonographer:          Tejinder Tolentino RDCS  Gender:            F                    Additional Staff:  Height:            168.00 cm            Admit Date:  Weight:            76.40 kg             Admission Status:     Inpatient -                                                                Priority discharge  BSA / BMI:         1.86 m2 / 27.07      Encounter#:           [id number]                     kg/m2  Blood Pressure:    111/61 mmHg          Department Location:  [facility]                                                                 Invasive    Study Type:    TRANSTHORACIC ECHO (TTE) COMPLETE  Diagnosis/ICD: Other cerebrovascular disease-I67.89  Indication:    Cerebrovascular Accident  CPT Code:      Echo Complete w Full Doppler-94706    Patient History:  Pertinent History: CVA.    Study Detail: The following Echo studies were performed: 2D, M-Mode, Doppler and                color flow. Agitated saline used as a contrast agent for                intraseptal flow evaluation.       PHYSICIAN INTERPRETATION:  Left Ventricle: Left ventricular ejection fraction is normal, by visual estimate at 60-65%. There are no regional left ventricular wall motion abnormalities. The left ventricular cavity size is normal. Spectral Doppler shows a normal pattern of left ventricular diastolic filling.  Left Atrium: The left atrium is normal in size. There is no evidence of a patent foramen ovale. A bubble study using agitated saline was performed. There is a delayed appearance of saline contrast bubbles noted in the left atrium, which is indicative of intrapulmonary shunting.  Right Ventricle: The right ventricle is normal in size. There is normal right ventricular global systolic function.  Right Atrium: The right atrium is normal in size.  Aortic Valve: The aortic valve is trileaflet. The aortic valve dimensionless index is 0.64. There is no evidence of aortic valve regurgitation. The peak instantaneous gradient of the aortic valve is 14.4 mmHg. The mean gradient of the aortic valve is 7.0 mmHg.  Mitral Valve: The mitral valve is normal in structure. There is no evidence of mitral valve regurgitation.  Tricuspid Valve: The tricuspid valve is structurally normal. No evidence of tricuspid regurgitation.  Pulmonic Valve: The pulmonic valve is structurally normal. There is no indication of pulmonic valve regurgitation.  Pericardium: There is no pericardial effusion noted.  Aorta: The aortic root is normal.  In comparison to the previous echocardiogram(s): There  are no prior studies on this patient for comparison purposes.       CONCLUSIONS:   1. Left ventricular ejection fraction is normal, by visual estimate at 60-65%.   2. There is normal right ventricular global systolic function.   3. There is no evidence of a patent foramen ovale.    QUANTITATIVE DATA SUMMARY:     2D MEASUREMENTS:          Normal Ranges:  LAs:             3.80 cm  (2.7-4.0cm)  IVSd:            0.86 cm  (0.6-1.1cm)  LVPWd:           1.34 cm  (0.6-1.1cm)  LVIDd:           4.70 cm  (3.9-5.9cm)  LVIDs:           2.58 cm  LV Mass Index:   101 g/m2  LVEDV Index:     40 ml/m2  LV % FS          45.0 %       LA VOLUME:                    Normal Ranges:  LA Vol A4C:        44.6 ml    (22+/-6mL/m2)  LA Vol A2C:        74.4 ml  LA Vol BP:         66.2 ml  LA Vol Index A4C:  23.9 ml/m2  LA Vol Index A2C:  40.0 ml/m2  LA Vol Index BP:   35.5 ml/m2  LA Area A4C:       15.7 cm2  LA Area A2C:       23.3 cm2  LA Major Axis A4C: 4.7 cm  LA Major Axis A2C: 6.2 cm  LA Vol A4C:        42.8 ml  LA Vol A2C:        68.6 ml  LA Vol Index BSA:  29.9 ml/m2       RA VOLUME BY A/L METHOD:            Normal Ranges:  RA Vol A4C:              48.5 ml    (8.3-19.5ml)  RA Vol Index A4C:        26.0 ml/m2  RA Area A4C:             18.5 cm2  RA Major Axis A4C:       6.0 cm       M-MODE MEASUREMENTS:         Normal Ranges:  LAs:                 4.67 cm (2.7-4.0cm)       LV SYSTOLIC FUNCTION BY 2D PLANIMETRY (MOD):                       Normal Ranges:  EF-A4C View:    40 % (>=55%)  EF-A2C View:    54 %  EF-Biplane:     50 %  EF-Visual:      63 %  LV EF Reported: 63 %       LV DIASTOLIC FUNCTION:             Normal Ranges:  MV Peak E:             0.62 m/s    (0.7-1.2 m/s)  MV Peak A:             0.85 m/s    (0.42-0.7 m/s)  E/A Ratio:             0.73        (1.0-2.2)  PulmV Sys Elias:         49.85 cm/s  PulmV Granger Elias:        35.88 cm/s  PulmV S/D Elias:         1.39  PulmV A Revs Elias:      27.83 cm/s  PulmV A Revs Dur:      171.27 msec        MITRAL VALVE:          Normal Ranges:  MV Vmax:      0.84 m/s (<=1.3m/s)  MV peak P.8 mmHg (<5mmHg)  MV mean P.2 mmHg (<2mmHg)  MV VTI:       29.95 cm (10-13cm)  MV DT:        321 msec (150-240msec)       AORTIC VALVE:                      Normal Ranges:  AoV Vmax:                1.89 m/s  (<=1.7m/s)  AoV Peak P.4 mmHg (<20mmHg)  AoV Mean P.0 mmHg  (1.7-11.5mmHg)  LVOT Max Elias:            1.12 m/s  (<=1.1m/s)  AoV VTI:                 36.47 cm  (18-25cm)  LVOT VTI:                23.43 cm  LVOT Diameter:           2.20 cm   (1.8-2.4cm)  AoV Area, VTI:           2.44 cm2  (2.5-5.5cm2)  AoV Area,Vmax:           2.24 cm2  (2.5-4.5cm2)  AoV Dimensionless Index: 0.64       RIGHT VENTRICLE:  RV Basal 3.70 cm  RV Mid   3.00 cm  RV Major 7.2 cm  TAPSE:   20.0 mm  RV s'    0.16 m/s       TRICUSPID VALVE/RVSP:          Normal Ranges:  Peak TR Velocity:     1.63 m/s  RV Syst Pressure:     14 mmHg  (< 30mmHg)       PULMONIC VALVE:          Normal Ranges:  PV Accel Time:  110 msec (>120ms)  PV Max Elias:     1.2 m/s  (0.6-0.9m/s)  PV Max P.6 mmHg       Pulmonary Veins:  PulmV A Revs Dur: 171.27 msec  PulmV A Revs Elias: 27.83 cm/s  PulmV Granger Elias:   35.88 cm/s  PulmV S/D Elias:    1.39  PulmV Sys Elias:    49.85 cm/s       [id number] Jamaal Denton DO  Electronically signed on 10/17/2024 at 12:51:13 PM       ** Final **      Physical Exam  Constitutional: Elderly female hard of hearing, pleasant, alert active, cooperative not in acute distress  Eyes: PERRLA, clear sclera  ENMT: Moist mucosal membranes, no exudate  Head / Neck: Atraumatic, normocephalic, supple neck, JVP not visualized  Lungs: Patent airways, CTABL  Heart: RRR, S1S2, no murmurs appreciated, palpable pulses in all extremities  GI: Soft, NT, ND, bowel sounds present in all quadrants  MSK: Moves all extremities freely, no restriction  of ROM, no joint edema  Extremities: Intact x 4, no peripheral edema  : No Izaguirre catheter  inserted  Breast: Deferred  Neurological: AAO x 3 to person, place and date, facial muscles symmetrical, sensation intact, strength 4/4, no acute focal neurological deficits appreciated  Psychological: Appropriate mood and behavior    Relevant Results           Scheduled medications  aspirin, 81 mg, oral, Daily  atorvastatin, 40 mg, oral, Nightly  budesonide, 0.5 mg, nebulization, BID  clopidogrel, 75 mg, oral, Daily  enoxaparin, 40 mg, subcutaneous, q24h  formoterol, 20 mcg, nebulization, BID  perflutren lipid microspheres, 2 mL of dilution, intravenous, Once in imaging  tiotropium, 2 puff, inhalation, Daily      Continuous medications     PRN medications  PRN medications: acetaminophen **OR** acetaminophen **OR** acetaminophen, oxygen      Assessment/Plan   This patient currently has cardiac telemetry ordered; if you would like to modify or discontinue the telemetry order, click here to go to the orders activity to modify/discontinue the order.     79 y.o. female with past medical history of asthma/chronic respiratory failure, likely COPD, on home O2 2 L, hard of hearing, half pack a day smoker, presenting as a transfer from [facility] emergency department.  This morning around 1 AM patient got up and went to the restroom.  When she got back to her room she attempted to lift her left leg back into bed and began experiencing left leg and left arm heaviness.     Plan:  Stroke protocol  Consult neurology:  RECOMMENDATIONS:  ECASA 81 mg daily for stroke prophylaxis.  Clopidogrel 75 mg daily for additional stroke prophylaxis for 21 days, then ASA alone.  Atorvastatin for adjunct stroke prophylaxis.  Echo has been completed and result is pending.  I added an EEG to rule out obvious interictal epileptiform activity.  Needs a set of orthostatics.  Discussed smoking cessation.  If no actionable pathology on the testing, can consider discharge planning from the neurological standpoint.   Continue  aspirin Plavix, atorvastatin 40 mg daily  Echocardiogram with bubble study        Asthma/chronic respiratory failure  -Continue home respiratory medications        DVT prophylaxis-Lovenox    Disposition: Presenting with strokelike symptom, needed further management, possible discharge tomorrow.         Joseph Bird, DO

## 2024-10-18 NOTE — PROGRESS NOTES
Physical Therapy                 Therapy Communication Note    Patient Name: Heaven Panchal  MRN: [id number]  Department: [facility] NEURO  Room: [facility]  Today's Date: 10/18/2024     Discipline: Physical Therapy    Missed Visit Reason: Missed Visit Reason: Other (Comment) (RN currently doing toileting with patient)    Missed Time: Attempt    Comment:

## 2024-10-18 NOTE — PROGRESS NOTES
Care Coordinator Note:  TCC spoke with patient regarding dc planning. Demo is correct. Lives at home with dtr Sydnie .Grand dtr is HHA and helps assist with showering. Patient is ind with laundry and dressing. Patient denies falls, has shower chair, grab bars, cane walker and wears 2 liters o2 at baseline at home. PCP is [facility] PCP  in [place]. Seen in August. [facility] in [place]. No AC prior to admission.   PT rec LOW OT rec no needs. Patient has had HHC PT in past and has exercises at home. Feels comfortable with that and no safety concerns. Does not wish for [facility] to set up HHC at this time. MD notified.     Plan: patient in with Stroke like symptoms. Neuro following. Mri neg. ? TIA/Seizure? Pre syncopal event. EEG planned for today. Med ready after EEG.     Status: inpatient  Payor: [employer] dual  Disposition: Home with dtr. Ref HHC PT set up. HNN.   Barrier: EEG  ADOD: today vs tomorrow    Priscila Almazan [facility]      10/18/24 1031   Discharge Planning   Living Arrangements Children   Support Systems Children   Assistance Needed Assisted with ADL   Type of Residence Private residence   Number of Stairs to Enter Residence 5   Number of Stairs Within Residence 0   Do you have animals or pets at home? Yes   Type of Animals or Pets 3 cats   Who is requesting discharge planning? Provider   Home or Post Acute Services None   Expected Discharge Disposition Home   Does the patient need discharge transport arranged? Yes   RoundTrip coordination needed? Yes   Has discharge transport been arranged? No   Financial Resource Strain   How hard is it for you to pay for the very basics like food, housing, medical care, and heating? Not hard   Housing Stability   In the last 12 months, was there a time when you were not able to pay the mortgage or rent on time? N   At any time in the past 12 months, were you homeless or living in a shelter (including now)? N   Transportation Needs   In the past 12  months, has lack of transportation kept you from medical appointments or from getting medications? no   In the past 12 months, has lack of transportation kept you from meetings, work, or from getting things needed for daily living? No

## 2024-10-18 NOTE — CARE PLAN
The patient's goals for the shift include      The clinical goals for the shift include Patient will remain safe during this shift and HDS      Problem: General Stroke  Goal: Establish a mutual long term goal with patient by discharge  Outcome: Progressing  Goal: Demonstrate improvement in neurological exam throughout the shift  Outcome: Progressing  Goal: Maintain BP within ordered limits throughout shift  Outcome: Progressing  Goal: Participate in treatment (ie., meds, therapy) throughout shift  Outcome: Progressing  Goal: No symptoms of aspiration throughout shift  Outcome: Progressing  Goal: No symptoms of hemorrhage throughout shift  Outcome: Progressing  Goal: Tolerate enteral feeding throughout shift  Outcome: Progressing  Goal: Decreased nausea/vomiting throughout shift  Outcome: Met  Goal: Controlled blood glucose throughout shift  Outcome: Progressing  Goal: Out of bed three times today  Outcome: Progressing

## 2024-10-19 VITALS
SYSTOLIC BLOOD PRESSURE: 114 MMHG | HEIGHT: 66 IN | HEART RATE: 65 BPM | RESPIRATION RATE: 18 BRPM | DIASTOLIC BLOOD PRESSURE: 70 MMHG | OXYGEN SATURATION: 98 % | WEIGHT: 168.43 LBS | TEMPERATURE: 97.9 F | BODY MASS INDEX: 27.07 KG/M2

## 2024-10-19 LAB
GLUCOSE BLD MANUAL STRIP-MCNC: 101 MG/DL (ref 74–99)
GLUCOSE BLD MANUAL STRIP-MCNC: 146 MG/DL (ref 74–99)

## 2024-10-19 PROCEDURE — G0378 HOSPITAL OBSERVATION PER HR: HCPCS

## 2024-10-19 PROCEDURE — 99239 HOSP IP/OBS DSCHRG MGMT >30: CPT | Performed by: INTERNAL MEDICINE

## 2024-10-19 PROCEDURE — 94640 AIRWAY INHALATION TREATMENT: CPT

## 2024-10-19 PROCEDURE — 2500000002 HC RX 250 W HCPCS SELF ADMINISTERED DRUGS (ALT 637 FOR MEDICARE OP, ALT 636 FOR OP/ED)

## 2024-10-19 PROCEDURE — 2500000001 HC RX 250 WO HCPCS SELF ADMINISTERED DRUGS (ALT 637 FOR MEDICARE OP): Performed by: NURSE PRACTITIONER

## 2024-10-19 PROCEDURE — 82947 ASSAY GLUCOSE BLOOD QUANT: CPT

## 2024-10-19 PROCEDURE — 2500000004 HC RX 250 GENERAL PHARMACY W/ HCPCS (ALT 636 FOR OP/ED): Performed by: INTERNAL MEDICINE

## 2024-10-19 RX ORDER — NAPROXEN SODIUM 220 MG/1
81 TABLET, FILM COATED ORAL DAILY
Qty: 30 TABLET | Refills: 0 | Status: SHIPPED | OUTPATIENT
Start: 2024-10-20 | End: 2024-11-19

## 2024-10-19 RX ORDER — ATORVASTATIN CALCIUM 40 MG/1
40 TABLET, FILM COATED ORAL NIGHTLY
Qty: 30 TABLET | Refills: 0 | Status: SHIPPED | OUTPATIENT
Start: 2024-10-19 | End: 2024-11-18

## 2024-10-19 RX ORDER — CLOPIDOGREL BISULFATE 75 MG/1
75 TABLET ORAL DAILY
Qty: 30 TABLET | Refills: 0 | Status: SHIPPED | OUTPATIENT
Start: 2024-10-20 | End: 2024-11-19

## 2024-10-19 RX ADMIN — ASPIRIN 81 MG 81 MG: 81 TABLET ORAL at 08:32

## 2024-10-19 RX ADMIN — FORMOTEROL FUMARATE DIHYDRATE 20 MCG: 20 SOLUTION RESPIRATORY (INHALATION) at 08:29

## 2024-10-19 RX ADMIN — TIOTROPIUM BROMIDE INHALATION SPRAY 2 PUFF: 3.12 SPRAY, METERED RESPIRATORY (INHALATION) at 08:28

## 2024-10-19 RX ADMIN — POLYETHYLENE GLYCOL 3350 17 G: 17 POWDER, FOR SOLUTION ORAL at 08:32

## 2024-10-19 RX ADMIN — BUDESONIDE 0.5 MG: 0.5 INHALANT ORAL at 08:29

## 2024-10-19 RX ADMIN — CLOPIDOGREL 75 MG: 75 TABLET ORAL at 08:32

## 2024-10-19 ASSESSMENT — PAIN SCALES - GENERAL: PAINLEVEL_OUTOF10: 0 - NO PAIN

## 2024-10-19 ASSESSMENT — PAIN - FUNCTIONAL ASSESSMENT: PAIN_FUNCTIONAL_ASSESSMENT: 0-10

## 2024-10-19 NOTE — SIGNIFICANT EVENT
Respiratory triage completed, patient triage 4-TID, ok per Dr. Bird to leave PRN.      10/19/24 0828   Medical Gas Therapy/Pulse Ox   Medical Gas Therapy Supplemental oxygen   Medical Gas Delivery Method Nasal cannula   SpO2 97 %   Patient Activity During SpO2 Measurement At rest   Pulse Oximetry Type Intermittent   Inhalation Therapy   Breath Sounds Pre-Treatment Right Clear   Breath Sounds Pre-Treatment Left Clear   Pre-Treatment Pulse 61   Pre-Treatment Respirations 16   Breath Sounds Post-Treatment Right Clear   Breath Sounds Post-Treatment Left Clear   Post-Treatment Respirations 16   Delivery Source Air   Position Semi Mcfarland's   Treatment Tolerance Tolerated well   $ Aerosol/MDI Treatment Charge Aerosol/inhalation treatment   Patient Evaluation Program   Pulmonary Status 3   Surgical Status 0   Chest X-Ray 0   Respiratory Pattern 0   Mental Status 0   Breath Sounds 0   Cough 0   Level of Activity 1   Oxygen Required for Sp02 Greater Than or Equal to 92% 1   Respiratory Acuity Score 5   Triage Level Triage - 4, Score of 4-10   Frequency TID and Q2 PRN (intermittant wheezing,  TRIAGE - 3&4)

## 2024-10-19 NOTE — DISCHARGE SUMMARY
Discharge Diagnosis  Acute CVA (cerebrovascular accident) (Multi)    Issues Requiring Follow-Up  Follow-up with PCP and neurology    Discharge Meds     Medication List      START taking these medications     aspirin 81 mg chewable tablet; Chew 1 tablet (81 mg) once daily.; Start   taking on: October 20, 2024   atorvastatin 40 mg tablet; Commonly known as: Lipitor; Take 1 tablet (40   mg) by mouth once daily at bedtime.   clopidogrel 75 mg tablet; Commonly known as: Plavix; Take 1 tablet (75   mg) by mouth once daily.; Start taking on: October 20, 2024     CONTINUE taking these medications     albuterol 90 mcg/actuation inhaler   Trelegy Ellipta 100-62.5-25 mcg blister with device; Generic drug:   fluticasone-umeclidin-vilanter       Test Results Pending At Discharge  Pending Labs       No current pending labs.            Hospital Course   Heaven Panchal is a 79 y.o. female with past medical history of asthma/chronic respiratory failure, likely COPD, on home O2 2 L, hard of hearing, half pack a day smoker, presenting as a transfer from [facility] emergency department.  This morning around 1 AM patient got up and went to the restroom.  When she got back to her room she attempted to lift her left leg back into bed and began experiencing left leg and left arm heaviness.  Patient reports that the episode lasted 5 to 10 minutes.  She then awakened at 8 AM made breakfast and afterwards she experienced a left arm and leg heaviness causing her to fall onto the kitchen floor.  Patient reports dizziness and lightheadedness during both episodes.  When patient arrived to [facility] patient states that she had another 3-4 episodes of the symptoms.  CT head was completed with no acute intracranial hemorrhage or mass effect, moderate changes of micro angiopathy, CTA neck with no significant stenosis of cervical carotid and vertebral arteries, CT head with no significant stenosis of the  major intracranial arteries, also showed right mastoid effusion with opacification of bilateral middle ear cavities correlate with otomastoiditis.  MRI brain with no evidence of acute ischemia.  Sodium 137, potassium 4.7, BUN 16, creatinine 0.7, blood glucose 89, INR 0.9, CBC not collected will order here, troponin 13.  Patient currently moving all extremities, no focal weakness.  Patient is without fever, chills, chest pain, shortness of breath, nausea, vomiting or diarrhea.  Patient was loaded with Plavix and given 324 of aspirin over at [place].     Patient was admitted for further management, with consultation of neurology.  On hospital day 2 symptoms of hemiparesis seem to improve, before achieving total resolution on hospital day 3 with no sign of focal deficit.  This presentation seems to be recurrent in nature as reported in EMR, and thought to be from TIAs.  Neurology evaluated patient with recommendation as stated below:    RECOMMENDATIONS:  ECASA 81 mg daily for stroke prophylaxis.  Clopidogrel 75 mg daily for additional stroke prophylaxis for 21 days, then ASA alone.  Atorvastatin for adjunct stroke prophylaxis.  Echo has been completed and result is pending.  I added an EEG to rule out obvious interictal epileptiform activity.  Needs a set of orthostatics.  Discussed smoking cessation.   Routine EEG was done, revealed no episode of seizures.  PT OT recommended, and on evaluation recommended low intensity level of continue care.  Overall patient has uneventful hospital course, and now stable for discharge home with follow-up with PCP and neurology.  Patient was seen by [facility], she declined any need to set up home health services at home at this time.    Prescription at discharge: Aspirin 81 mg daily, atorvastatin 41 mg daily, and Plavix 75 mg daily or sent to her pharmacy on file.    Greater than 30 minutes dedicated to this discharge that included educating patient coordinating care.    Pertinent Physical  Exam At Time of Discharge  Physical Exam  Constitutional: Elderly female hard of hearing, pleasant, alert active, cooperative not in acute distress  Eyes: PERRLA, clear sclera  ENMT: Moist mucosal membranes, no exudate  Head / Neck: Atraumatic, normocephalic, supple neck, JVP not visualized  Lungs: Patent airways, CTABL  Heart: RRR, S1S2, no murmurs appreciated, palpable pulses in all extremities  GI: Soft, NT, ND, bowel sounds present in all quadrants  MSK: Moves all extremities freely, no restriction  of ROM, no joint edema  Extremities: Intact x 4, no peripheral edema  : No Izaguirre catheter inserted  Breast: Deferred  Neurological: AAO x 3 to person, place and date, facial muscles symmetrical, sensation intact, strength 4/4, no acute focal neurological deficits appreciated  Psychological: Appropriate mood and behavior    Outpatient Follow-Up  No future appointments.      Joseph Bird DO

## 2024-10-19 NOTE — PROGRESS NOTES
Heaven Quiroz is a 79 y.o. female on day 2 of admission presenting with Acute CVA (cerebrovascular accident) (Multi).      Subjective   Patient was seen and examined bedside this morning, and no longer feeling weak on her extremities.       Objective     Last Recorded Vitals  /74 (BP Location: Right arm, Patient Position: Lying)   Pulse 67   Temp 36.2 °C (97.2 °F) (Temporal)   Resp 16   Wt 76.4 kg (168 lb 6.9 oz)   SpO2 96%   Intake/Output last 3 Shifts:    Intake/Output Summary (Last 24 hours) at 10/18/2024 2041  Last data filed at 10/18/2024 1517  Gross per 24 hour   Intake 720 ml   Output 1 ml   Net 719 ml       Admission Weight  Weight: 73.7 kg (162 lb 8 oz) (10/16/24 1910)    Daily Weight  10/17/24 : 76.4 kg (168 lb 6.9 oz)    Image Results  Transthoracic Echo (TTE) Complete     [facility], [address] [place] [address]               Tel [phone] and Fax [phone]    TRANSTHORACIC ECHOCARDIOGRAM REPORT       Patient Name:      HEAVEN QUIROZ       Reading Physician:    [id number] Jamaal Denton DO  Study Date:        10/17/2024           Ordering Provider:    [id number] ADAMA LOVE  MRN/PID:           [id number]             Fellow:  Accession#:        [id number]         Nurse:  Date of Birth/Age: 1945 / 79 years Sonographer:          Tejinder Tolentino RDCS  Gender:            F                    Additional Staff:  Height:            168.00 cm            Admit Date:  Weight:            76.40 kg             Admission Status:     Inpatient -                                                                Priority discharge  BSA / BMI:         1.86 m2 / 27.07      Encounter#:           [id number]                     kg/m2  Blood Pressure:    111/61 mmHg          Department Location:  [facility]                                                                 Invasive    Study Type:    TRANSTHORACIC ECHO (TTE) COMPLETE  Diagnosis/ICD: Other cerebrovascular disease-I67.89  Indication:    Cerebrovascular Accident  CPT Code:      Echo Complete w Full Doppler-[id number]    Patient History:  Pertinent History: CVA.    Study Detail: The following Echo studies were performed: 2D, M-Mode, Doppler and                color flow. Agitated saline used as a contrast agent for                intraseptal flow evaluation.       PHYSICIAN INTERPRETATION:  Left Ventricle: Left ventricular ejection fraction is normal, by visual estimate at 60-65%. There are no regional left ventricular wall motion abnormalities. The left ventricular cavity size is normal. Spectral Doppler shows a normal pattern of left ventricular diastolic filling.  Left Atrium: The left atrium is normal in size. There is no evidence of a patent foramen ovale. A bubble study using agitated saline was performed. There is a delayed appearance of saline contrast bubbles noted in the left atrium, which is indicative of intrapulmonary shunting.  Right Ventricle: The right ventricle is normal in size. There is normal right ventricular global systolic function.  Right Atrium: The right atrium is normal in size.  Aortic Valve: The aortic valve is trileaflet. The aortic valve dimensionless index is 0.64. There is no evidence of aortic valve regurgitation. The peak instantaneous gradient of the aortic valve is 14.4 mmHg. The mean gradient of the aortic valve is 7.0 mmHg.  Mitral Valve: The mitral valve is normal in structure. There is no evidence of mitral valve regurgitation.  Tricuspid Valve: The tricuspid valve is structurally normal. No evidence of tricuspid regurgitation.  Pulmonic Valve: The pulmonic valve is structurally normal. There is no indication of pulmonic valve regurgitation.  Pericardium: There is no pericardial effusion noted.  Aorta: The aortic root is normal.  In comparison to the previous echocardiogram(s): There are  no prior studies on this patient for comparison purposes.       CONCLUSIONS:   1. Left ventricular ejection fraction is normal, by visual estimate at 60-65%.   2. There is normal right ventricular global systolic function.   3. There is no evidence of a patent foramen ovale.    QUANTITATIVE DATA SUMMARY:     2D MEASUREMENTS:          Normal Ranges:  LAs:             3.80 cm  (2.7-4.0cm)  IVSd:            0.86 cm  (0.6-1.1cm)  LVPWd:           1.34 cm  (0.6-1.1cm)  LVIDd:           4.70 cm  (3.9-5.9cm)  LVIDs:           2.58 cm  LV Mass Index:   101 g/m2  LVEDV Index:     40 ml/m2  LV % FS          45.0 %       LA VOLUME:                    Normal Ranges:  LA Vol A4C:        44.6 ml    (22+/-6mL/m2)  LA Vol A2C:        74.4 ml  LA Vol BP:         66.2 ml  LA Vol Index A4C:  23.9 ml/m2  LA Vol Index A2C:  40.0 ml/m2  LA Vol Index BP:   35.5 ml/m2  LA Area A4C:       15.7 cm2  LA Area A2C:       23.3 cm2  LA Major Axis A4C: 4.7 cm  LA Major Axis A2C: 6.2 cm  LA Vol A4C:        42.8 ml  LA Vol A2C:        68.6 ml  LA Vol Index BSA:  29.9 ml/m2       RA VOLUME BY A/L METHOD:            Normal Ranges:  RA Vol A4C:              48.5 ml    (8.3-19.5ml)  RA Vol Index A4C:        26.0 ml/m2  RA Area A4C:             18.5 cm2  RA Major Axis A4C:       6.0 cm       M-MODE MEASUREMENTS:         Normal Ranges:  LAs:                 4.67 cm (2.7-4.0cm)       LV SYSTOLIC FUNCTION BY 2D PLANIMETRY (MOD):                       Normal Ranges:  EF-A4C View:    40 % (>=55%)  EF-A2C View:    54 %  EF-Biplane:     50 %  EF-Visual:      63 %  LV EF Reported: 63 %       LV DIASTOLIC FUNCTION:             Normal Ranges:  MV Peak E:             0.62 m/s    (0.7-1.2 m/s)  MV Peak A:             0.85 m/s    (0.42-0.7 m/s)  E/A Ratio:             0.73        (1.0-2.2)  PulmV Sys Elias:         49.85 cm/s  PulmV Granger Elias:        35.88 cm/s  PulmV S/D Elias:         1.39  PulmV A Revs Elias:      27.83 cm/s  PulmV A Revs Dur:      171.27 msec        MITRAL VALVE:          Normal Ranges:  MV Vmax:      0.84 m/s (<=1.3m/s)  MV peak P.8 mmHg (<5mmHg)  MV mean P.2 mmHg (<2mmHg)  MV VTI:       29.95 cm (10-13cm)  MV DT:        321 msec (150-240msec)       AORTIC VALVE:                      Normal Ranges:  AoV Vmax:                1.89 m/s  (<=1.7m/s)  AoV Peak P.4 mmHg (<20mmHg)  AoV Mean P.0 mmHg  (1.7-11.5mmHg)  LVOT Max Elias:            1.12 m/s  (<=1.1m/s)  AoV VTI:                 36.47 cm  (18-25cm)  LVOT VTI:                23.43 cm  LVOT Diameter:           2.20 cm   (1.8-2.4cm)  AoV Area, VTI:           2.44 cm2  (2.5-5.5cm2)  AoV Area,Vmax:           2.24 cm2  (2.5-4.5cm2)  AoV Dimensionless Index: 0.64       RIGHT VENTRICLE:  RV Basal 3.70 cm  RV Mid   3.00 cm  RV Major 7.2 cm  TAPSE:   20.0 mm  RV s'    0.16 m/s       TRICUSPID VALVE/RVSP:          Normal Ranges:  Peak TR Velocity:     1.63 m/s  RV Syst Pressure:     14 mmHg  (< 30mmHg)       PULMONIC VALVE:          Normal Ranges:  PV Accel Time:  110 msec (>120ms)  PV Max Elias:     1.2 m/s  (0.6-0.9m/s)  PV Max P.6 mmHg       Pulmonary Veins:  PulmV A Revs Dur: 171.27 msec  PulmV A Revs Elias: 27.83 cm/s  PulmV Granger Elias:   35.88 cm/s  PulmV S/D Elias:    1.39  PulmV Sys Elias:    49.85 cm/s       [id number] Jamaal Denton DO  Electronically signed on 10/17/2024 at 12:51:13 PM       ** Final **      Physical Exam  Constitutional: Elderly female hard of hearing, pleasant, alert active, cooperative not in acute distress  Eyes: PERRLA, clear sclera  ENMT: Moist mucosal membranes, no exudate  Head / Neck: Atraumatic, normocephalic, supple neck, JVP not visualized  Lungs: Patent airways, CTABL  Heart: RRR, S1S2, no murmurs appreciated, palpable pulses in all extremities  GI: Soft, NT, ND, bowel sounds present in all quadrants  MSK: Moves all extremities freely, no restriction  of ROM, no joint edema  Extremities: Intact x 4, no peripheral edema  : No Izaguirre catheter  inserted  Breast: Deferred  Neurological: AAO x 3 to person, place and date, facial muscles symmetrical, sensation intact, strength 4/4, no acute focal neurological deficits appreciated  Psychological: Appropriate mood and behavior  Relevant Results             Scheduled medications  aspirin, 81 mg, oral, Daily  atorvastatin, 40 mg, oral, Nightly  budesonide, 0.5 mg, nebulization, BID  clopidogrel, 75 mg, oral, Daily  enoxaparin, 40 mg, subcutaneous, q24h  formoterol, 20 mcg, nebulization, BID  perflutren lipid microspheres, 2 mL of dilution, intravenous, Once in imaging  polyethylene glycol, 17 g, oral, Daily  tiotropium, 2 puff, inhalation, Daily      Continuous medications     PRN medications  PRN medications: acetaminophen **OR** acetaminophen **OR** acetaminophen, oxygen    Assessment/Plan   This patient currently has cardiac telemetry ordered; if you would like to modify or discontinue the telemetry order, click here to go to the orders activity to modify/discontinue the order.    79 y.o. female with past medical history of asthma/chronic respiratory failure, likely COPD, on home O2 2 L, hard of hearing, half pack a day smoker, presenting as a transfer from [facility] emergency department.  This morning around 1 AM patient got up and went to the restroom.  When she got back to her room she attempted to lift her left leg back into bed and began experiencing left leg and left arm heaviness.      Plan:  Stroke protocol  Consult neurology:  RECOMMENDATIONS:  ECASA 81 mg daily for stroke prophylaxis.  Clopidogrel 75 mg daily for additional stroke prophylaxis for 21 days, then ASA alone.  Atorvastatin for adjunct stroke prophylaxis.  Echo has been completed and result is pending.  I added an EEG to rule out obvious interictal epileptiform activity.  Needs a set of orthostatics.  Discussed smoking cessation.  If no actionable pathology on the testing, can consider discharge planning from the  neurological standpoint.   Continue aspirin Plavix, atorvastatin 40 mg daily  Echocardiogram with bubble study  EEG negative for seizures  PT OT: Recommend low intensity level of continue care     Asthma/chronic respiratory failure  -Continue home respiratory medications        DVT prophylaxis-Lovenox     Disposition: Presenting with strokelike symptom, needed further management.  Discharge tomorrow.                Joseph Bird DO

## 2025-03-04 ENCOUNTER — FOLLOWUP TELEPHONE ENCOUNTER (OUTPATIENT)
Dept: AUDIOLOGY | Age: 80
End: 2025-03-04

## 2025-03-04 NOTE — PROGRESS NOTES
Pt is scheduled for HAE.   They would like a sooner appt.  Called re appt and Spoke to daughter, primary insurance is [facility] dual according to her.   Instructed her to  call insurance customer service and ck where benefits are ie [facility] hearing ??  She is checking and will call back.  Dtr is Jada [phone].  Appt is currently SEJOSEPH 3/21/2025 however [facility] would be closer - they would like [facility] if possible.  Will talk to Carolina about [facility] appt.  Electronically signed by Yanelis Goetz on 3/4/2025 at 1:13 PM

## 2025-03-05 ENCOUNTER — FOLLOWUP TELEPHONE ENCOUNTER (OUTPATIENT)
Dept: AUDIOLOGY | Age: 80
End: 2025-03-05

## 2025-03-05 NOTE — PROGRESS NOTES
Spoke to dtdiego broderick appt.  They had it sched for 3/21 at [facility], I will be out of the office.   They originally wanted  so Amira okerica appt 3/28/25 at 11 at .  They were told to arrive at 1030.  Dtr was previously instructed to ck insurance, she states she verified that [employer] was covered.  Electronically signed by Yanelis Goetz on 3/5/2025 at 3:33 PM

## 2025-03-27 ENCOUNTER — TELEPHONE (OUTPATIENT)
Dept: AUDIOLOGY | Age: 80
End: 2025-03-27

## 2025-03-27 NOTE — TELEPHONE ENCOUNTER
Called number left in appt desk for daughter and it was not a working number   Called Jada in chart # and VM was for someone else - hung up did not leave message   Called patients number listed as home and it was not in service       Called grandchild (listed in contacts) and spoke with her and told her to please relay the message to arrive at 11 am tomorrow for registration before 1130 am appt

## 2025-03-28 ENCOUNTER — HOSPITAL ENCOUNTER (OUTPATIENT)
Dept: AUDIOLOGY | Age: 80
Discharge: HOME | End: 2025-03-28

## 2025-03-28 PROCEDURE — 9990000010 HC NO CHARGE VISIT: Performed by: AUDIOLOGIST

## 2025-03-28 NOTE — PROGRESS NOTES
The patient came in for a hearing aid evaluation.  Hearing test results were not reviewed as the patient forgot them.  After received it will be determined if and the patient is a candidate for hearing aids per medicaid guidelines.  Release of information was signed. After all information needed is collected a prior authorization will be submitted to the patient's insurance pending candidacy.  The patient will be contacted regarding approval or denial.  Hearing aids were selected and will be ordered if an approval is received.  ITE: rechargeable, pink.  Impressions were taken bilaterally, without incident.      Carolina Quiñonez, AuD  3/28/2025

## 2025-05-13 ENCOUNTER — APPOINTMENT (OUTPATIENT)
Dept: CT IMAGING | Age: 80
End: 2025-05-13
Payer: COMMERCIAL

## 2025-05-13 ENCOUNTER — HOSPITAL ENCOUNTER (EMERGENCY)
Age: 80
Discharge: HOME | End: 2025-05-13
Attending: STUDENT IN AN ORGANIZED HEALTH CARE EDUCATION/TRAINING PROGRAM
Payer: COMMERCIAL

## 2025-05-13 ENCOUNTER — APPOINTMENT (OUTPATIENT)
Dept: GENERAL RADIOLOGY | Age: 80
End: 2025-05-13
Payer: COMMERCIAL

## 2025-05-13 VITALS
OXYGEN SATURATION: 100 % | DIASTOLIC BLOOD PRESSURE: 70 MMHG | HEART RATE: 79 BPM | TEMPERATURE: 97.4 F | RESPIRATION RATE: 15 BRPM | SYSTOLIC BLOOD PRESSURE: 122 MMHG

## 2025-05-13 DIAGNOSIS — I71.43 INFRARENAL ABDOMINAL AORTIC ANEURYSM (AAA) WITHOUT RUPTURE: Primary | ICD-10-CM

## 2025-05-13 DIAGNOSIS — G89.29 CHRONIC PAIN OF BOTH KNEES: ICD-10-CM

## 2025-05-13 DIAGNOSIS — M25.561 CHRONIC PAIN OF BOTH KNEES: ICD-10-CM

## 2025-05-13 DIAGNOSIS — M25.562 CHRONIC PAIN OF BOTH KNEES: ICD-10-CM

## 2025-05-13 LAB
ALBUMIN SERPL-MCNC: 4.1 G/DL (ref 3.5–5.2)
ALP SERPL-CCNC: 93 U/L (ref 35–104)
ALT SERPL-CCNC: 11 U/L (ref 0–32)
AMMONIA PLAS-SCNC: <10 UMOL/L (ref 11–51)
ANION GAP SERPL CALCULATED.3IONS-SCNC: 15 MMOL/L (ref 7–16)
AST SERPL-CCNC: 17 U/L (ref 0–31)
BASOPHILS # BLD: 0.08 K/UL (ref 0–0.2)
BASOPHILS NFR BLD: 1 % (ref 0–2)
BILIRUB SERPL-MCNC: 0.4 MG/DL (ref 0–1.2)
BUN SERPL-MCNC: 21 MG/DL (ref 6–23)
CALCIUM SERPL-MCNC: 9.3 MG/DL (ref 8.6–10.2)
CHLORIDE SERPL-SCNC: 101 MMOL/L (ref 98–107)
CO2 SERPL-SCNC: 23 MMOL/L (ref 22–29)
CREAT SERPL-MCNC: 0.8 MG/DL (ref 0.5–1)
EOSINOPHIL # BLD: 0.04 K/UL (ref 0.05–0.5)
EOSINOPHILS RELATIVE PERCENT: 0 % (ref 0–6)
ERYTHROCYTE [DISTWIDTH] IN BLOOD BY AUTOMATED COUNT: 14.8 % (ref 11.5–15)
GFR, ESTIMATED: 81 ML/MIN/1.73M2
GLUCOSE SERPL-MCNC: 87 MG/DL (ref 74–99)
HCT VFR BLD AUTO: 39.9 % (ref 34–48)
HGB BLD-MCNC: 12.8 G/DL (ref 11.5–15.5)
IMM GRANULOCYTES # BLD AUTO: 0.06 K/UL (ref 0–0.58)
IMM GRANULOCYTES NFR BLD: 1 % (ref 0–5)
LYMPHOCYTES NFR BLD: 1.88 K/UL (ref 1.5–4)
LYMPHOCYTES RELATIVE PERCENT: 21 % (ref 20–42)
MCH RBC QN AUTO: 29 PG (ref 26–35)
MCHC RBC AUTO-ENTMCNC: 32.1 G/DL (ref 32–34.5)
MCV RBC AUTO: 90.5 FL (ref 80–99.9)
MONOCYTES NFR BLD: 0.33 K/UL (ref 0.1–0.95)
MONOCYTES NFR BLD: 4 % (ref 2–12)
NEUTROPHILS NFR BLD: 73 % (ref 43–80)
NEUTS SEG NFR BLD: 6.57 K/UL (ref 1.8–7.3)
PLATELET # BLD AUTO: 205 K/UL (ref 130–450)
PMV BLD AUTO: 10.2 FL (ref 7–12)
POTASSIUM SERPL-SCNC: 4.5 MMOL/L (ref 3.5–5)
PROT SERPL-MCNC: 6.9 G/DL (ref 6.4–8.3)
RBC # BLD AUTO: 4.41 M/UL (ref 3.5–5.5)
SODIUM SERPL-SCNC: 139 MMOL/L (ref 132–146)
TROPONIN I SERPL HS-MCNC: 12 NG/L (ref 0–14)
TROPONIN I SERPL HS-MCNC: 13 NG/L (ref 0–14)
WBC OTHER # BLD: 9 K/UL (ref 4.5–11.5)

## 2025-05-13 PROCEDURE — 96374 THER/PROPH/DIAG INJ IV PUSH: CPT

## 2025-05-13 PROCEDURE — 84484 ASSAY OF TROPONIN QUANT: CPT

## 2025-05-13 PROCEDURE — 93005 ELECTROCARDIOGRAM TRACING: CPT

## 2025-05-13 PROCEDURE — 6360000002 HC RX W HCPCS: Performed by: STUDENT IN AN ORGANIZED HEALTH CARE EDUCATION/TRAINING PROGRAM

## 2025-05-13 PROCEDURE — 70450 CT HEAD/BRAIN W/O DYE: CPT

## 2025-05-13 PROCEDURE — 96372 THER/PROPH/DIAG INJ SC/IM: CPT

## 2025-05-13 PROCEDURE — 85025 COMPLETE CBC W/AUTO DIFF WBC: CPT

## 2025-05-13 PROCEDURE — 71046 X-RAY EXAM CHEST 2 VIEWS: CPT

## 2025-05-13 PROCEDURE — 2500000003 HC RX 250 WO HCPCS: Performed by: STUDENT IN AN ORGANIZED HEALTH CARE EDUCATION/TRAINING PROGRAM

## 2025-05-13 PROCEDURE — 73560 X-RAY EXAM OF KNEE 1 OR 2: CPT

## 2025-05-13 PROCEDURE — 74177 CT ABD & PELVIS W/CONTRAST: CPT

## 2025-05-13 PROCEDURE — 80053 COMPREHEN METABOLIC PANEL: CPT

## 2025-05-13 PROCEDURE — 99285 EMERGENCY DEPT VISIT HI MDM: CPT

## 2025-05-13 PROCEDURE — 6360000004 HC RX CONTRAST MEDICATION: Performed by: RADIOLOGY

## 2025-05-13 PROCEDURE — 82140 ASSAY OF AMMONIA: CPT

## 2025-05-13 PROCEDURE — 6360000002 HC RX W HCPCS

## 2025-05-13 RX ORDER — KETOROLAC TROMETHAMINE 30 MG/ML
15 INJECTION, SOLUTION INTRAMUSCULAR; INTRAVENOUS ONCE
Status: COMPLETED | OUTPATIENT
Start: 2025-05-13 | End: 2025-05-13

## 2025-05-13 RX ORDER — IOPAMIDOL 755 MG/ML
75 INJECTION, SOLUTION INTRAVASCULAR
Status: COMPLETED | OUTPATIENT
Start: 2025-05-13 | End: 2025-05-13

## 2025-05-13 RX ADMIN — WATER 60 MG: 1 INJECTION INTRAMUSCULAR; INTRAVENOUS; SUBCUTANEOUS at 19:40

## 2025-05-13 RX ADMIN — IOPAMIDOL 75 ML: 755 INJECTION, SOLUTION INTRAVENOUS at 17:48

## 2025-05-13 RX ADMIN — KETOROLAC TROMETHAMINE 15 MG: 30 INJECTION, SOLUTION INTRAMUSCULAR at 19:41

## 2025-05-13 ASSESSMENT — PAIN SCALES - GENERAL: PAINLEVEL_OUTOF10: 5

## 2025-05-13 ASSESSMENT — PAIN - FUNCTIONAL ASSESSMENT: PAIN_FUNCTIONAL_ASSESSMENT: 0-10

## 2025-05-13 NOTE — DISCHARGE INSTRUCTIONS
XR CHEST (2 VW)   Final Result   No acute process.         XR KNEE LEFT (1-2 VIEWS)   Final Result      Severe medial compartment narrowing/osteoarthritis with bone-on-bone contact   and resultant varus angulation.  Lesser degenerative changes noted within the   patellofemoral compartment. There is some degenerative lateral subluxation of   the tibia in relation of the femur.         XR KNEE RIGHT (1-2 VIEWS)   Final Result      1.  Severe medial compartment narrowing with marginal osteophyte formation   from all compartments of the knee consistent with osteoarthritis.  There is   lateral degenerative subluxation of the tibia in relation of the femur as   well as degenerative mild varus angulation of the knee.      2.  No evidence of fracture.         CT ABDOMEN PELVIS W IV CONTRAST Additional Contrast? None   Final Result   1. Fluid within the colon may be seen in the setting of diarrhea.   2. Large amount of stool within the rectum.  Recommend correlation for fecal   impaction.   3. Infrarenal abdominal aortic aneurysm, measuring up to 3.8 cm.   4. 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      RECOMMENDATIONS:   For management of fusiform AAA:      3.5-3.9 cm AAA, recommend follow-up every 2 years.      Note: Recommend Vascular consultation if a fusiform AAA enlarges by >0.5 cm   in 6 months or >1 cm in 1 year or for a saccular AAA of any size.      References: J Am Aure Radiol 2013; 10(10):789-794; J Vasc Surg. 2018; 67:2-77         CT HEAD WO CONTRAST   Final Result   1. No CT evidence of acute intracranial abnormality.   2. Mild-to-moderate chronic microvascular ischemic changes.   3. Mild-to-moderate cerebral volume loss.           Return to the emergency department if you start having issues with strokelike symptoms, fevers, chills, or you are having falls at home.

## 2025-05-13 NOTE — ED PROVIDER NOTES
[facility] EMERGENCY DEPARTMENT  EMERGENCY DEPARTMENT ENCOUNTER        Pt Name: Regi Chacon  MRN: [id number]  Birthdate 1945  Date of evaluation: 5/13/2025  Provider: Des Powell MD  PCP: No primary care provider on file.  Note Started: 3:01 PM EDT 5/13/25    CHIEF COMPLAINT       Chief Complaint   Patient presents with    Joint Pain     Pt states she wants pain pills for her arthritis- sees ortho who recommended cortisone injections.        HISTORY OF PRESENT ILLNESS: 1 or more Elements        Limitations to history : None    Regi Chacon is a 80 y.o. female who presents to the emergency room for increased fatigue and generalized weakness as well as arthritis in her knees and right hip.  States she has been using Tylenol and ibuprofen, but this is not helping anymore.  She is having difficulty ambulating.  Denies any falls or injuries.  Is alert and oriented x 3.  Denies any fevers or chills or urinary symptoms.    Nursing Notes were all reviewed and agreed with or any disagreements were addressed in the HPI.      REVIEW OF EXTERNAL NOTE :       Admission 3/1/2023 patient is admitted for closed right hip fracture    REVIEW OF SYSTEMS :      Positives and Pertinent negatives as per HPI.     SURGICAL HISTORY     Past Surgical History:   Procedure Laterality Date    DENTAL SURGERY      full mouth with dentures    HIP FRACTURE SURGERY Right 3/2/2023    RIGHT HIP CEPHALOMEDULLARY NAIL FOR RIGHT INTERTROCHANTERIC HIP FRACTURE performed by Mkiie Brown MD at [facility] OR    TONSILLECTOMY      age 7     TUBAL LIGATION      43 years ago     TUBAL LIGATION         CURRENTMEDICATIONS       Previous Medications    ACETAMINOPHEN (TYLENOL) 500 MG TABLET    Take 2 tablets by mouth every 6 hours as needed for Fever or Pain    ALBUTEROL SULFATE HFA (PROVENTIL;VENTOLIN;PROAIR) 108 (90 BASE) MCG/ACT INHALER    Inhale 2 puffs into the lungs every 4 hours as needed for Wheezing or 
Status: She is alert.      Sensory: No sensory deficit.      Motor: No weakness.   Psychiatric:         Mood and Affect: Mood normal.         Behavior: Behavior normal.          Procedures     MDM     Amount and/or Complexity of Data Reviewed  Clinical lab tests: reviewed  Tests in the radiology section of CPT®: reviewed         72-year-old female with chronic knee pain and hip pain present emerged part for concerns of fatigue and generalized weakness.  She is also been reporting suprapubic pain and loose watery stools.  See HPI for information.  Patient is alert and oriented x 4.  Fortunately her EKG and troponin without evidence of type I ischemic changes no arrhythmias.  Ammonia level less than 10 with normal liver function unlikely hepatic encephalopathy.  Electrolytes were without any derangements.  Her blood work was quite unremarkable with normal white blood cell count no anemia.  Patient was having some diarrheal illness but no bowel obstruction no stones no bowel abscess no free air no hematic peritoneum on CT imaging of the abdomen pelvis.  X-ray of the left or right knee showed arthritic changes but no acute fracture or dislocation.  CT scan of the head did not show brain bleed skull fracture or brain mass.  Chest x-ray did not show any evidence of consolidations or pneumothorax.  The patient was able to ambulate after receiving IV Toradol.  She is also given IM methylprednisone injection as well has symptomatic improvement in her pain.  Instructed to the patient to follow-up with her orthopedic doctor for her chronic knee pain.  She was given strict return precaution come back to emerged part if her symptoms were to worsen.        ED Course as of 05/14/25 0014   Tue May 13, 2025   1656 EKG rate 57, sinus bradycardia, P interval 170, QRS 90, QTc 439, stable compared to EKG from 3/1/2023 [JG]   1825 My interpretation of patient's knee x-ray no visualized fracture, appears to have significant arthritis [JG]

## 2025-05-15 LAB
EKG ATRIAL RATE: 57 BPM
EKG P AXIS: 96 DEGREES
EKG P-R INTERVAL: 170 MS
EKG Q-T INTERVAL: 452 MS
EKG QRS DURATION: 90 MS
EKG QTC CALCULATION (BAZETT): 439 MS
EKG R AXIS: 32 DEGREES
EKG T AXIS: 62 DEGREES
EKG VENTRICULAR RATE: 57 BPM

## 2025-05-15 PROCEDURE — 93010 ELECTROCARDIOGRAM REPORT: CPT | Performed by: INTERNAL MEDICINE

## 2025-06-11 ENCOUNTER — APPOINTMENT (OUTPATIENT)
Dept: GENERAL RADIOLOGY | Age: 80
DRG: 069 | End: 2025-06-11
Payer: MEDICARE

## 2025-06-11 ENCOUNTER — HOSPITAL ENCOUNTER (INPATIENT)
Age: 80
LOS: 1 days | Discharge: HOME | DRG: 069 | End: 2025-06-15
Attending: EMERGENCY MEDICINE | Admitting: INTERNAL MEDICINE
Payer: MEDICARE

## 2025-06-11 ENCOUNTER — APPOINTMENT (OUTPATIENT)
Dept: CT IMAGING | Age: 80
DRG: 069 | End: 2025-06-11
Payer: MEDICARE

## 2025-06-11 DIAGNOSIS — R41.82 ALTERED MENTAL STATUS, UNSPECIFIED ALTERED MENTAL STATUS TYPE: ICD-10-CM

## 2025-06-11 DIAGNOSIS — I63.9 CEREBROVASCULAR ACCIDENT (CVA), UNSPECIFIED MECHANISM (HCC): ICD-10-CM

## 2025-06-11 DIAGNOSIS — G45.9 TIA (TRANSIENT ISCHEMIC ATTACK): ICD-10-CM

## 2025-06-11 DIAGNOSIS — I73.9 PVD (PERIPHERAL VASCULAR DISEASE) WITH CLAUDICATION: Primary | ICD-10-CM

## 2025-06-11 PROBLEM — R60.0 EDEMA OF LOWER EXTREMITY: Status: ACTIVE | Noted: 2025-06-11

## 2025-06-11 PROBLEM — R55 PRE-SYNCOPE: Status: ACTIVE | Noted: 2025-06-11

## 2025-06-11 PROBLEM — R29.90 STROKE-LIKE SYMPTOMS: Status: ACTIVE | Noted: 2025-06-11

## 2025-06-11 LAB
25(OH)D3 SERPL-MCNC: 10.4 NG/ML (ref 30–100)
ALBUMIN SERPL-MCNC: 3.2 G/DL (ref 3.5–5.2)
ALP SERPL-CCNC: 74 U/L (ref 35–104)
ALT SERPL-CCNC: 13 U/L (ref 0–35)
ANION GAP SERPL CALCULATED.3IONS-SCNC: 10 MMOL/L (ref 7–16)
AST SERPL-CCNC: 27 U/L (ref 0–35)
BASOPHILS # BLD: 0.04 K/UL (ref 0–0.2)
BASOPHILS NFR BLD: 0 % (ref 0–2)
BILIRUB SERPL-MCNC: 0.2 MG/DL (ref 0–1.2)
BNP SERPL-MCNC: 691 PG/ML (ref 0–450)
BUN SERPL-MCNC: 25 MG/DL (ref 8–23)
CALCIUM SERPL-MCNC: 8 MG/DL (ref 8.8–10.2)
CHLORIDE SERPL-SCNC: 108 MMOL/L (ref 98–107)
CHOLEST SERPL-MCNC: 151 MG/DL
CO2 SERPL-SCNC: 20 MMOL/L (ref 22–29)
CREAT SERPL-MCNC: 0.7 MG/DL (ref 0.5–1)
EKG ATRIAL RATE: 50 BPM
EKG P AXIS: 80 DEGREES
EKG P-R INTERVAL: 184 MS
EKG Q-T INTERVAL: 454 MS
EKG QRS DURATION: 86 MS
EKG QTC CALCULATION (BAZETT): 413 MS
EKG R AXIS: 69 DEGREES
EKG T AXIS: 61 DEGREES
EKG VENTRICULAR RATE: 50 BPM
EOSINOPHIL # BLD: 0.19 K/UL (ref 0.05–0.5)
EOSINOPHILS RELATIVE PERCENT: 2 % (ref 0–6)
ERYTHROCYTE [DISTWIDTH] IN BLOOD BY AUTOMATED COUNT: 14.8 % (ref 11.5–15)
FLUAV RNA RESP QL NAA+PROBE: NOT DETECTED
FLUBV RNA RESP QL NAA+PROBE: NOT DETECTED
GFR, ESTIMATED: 89 ML/MIN/1.73M2
GLUCOSE SERPL-MCNC: 157 MG/DL (ref 74–99)
HBA1C MFR BLD: 5.7 % (ref 4–5.6)
HCT VFR BLD AUTO: 45 % (ref 34–48)
HDLC SERPL-MCNC: 50 MG/DL
HGB BLD-MCNC: 13.8 G/DL (ref 11.5–15.5)
IMM GRANULOCYTES # BLD AUTO: 0.03 K/UL (ref 0–0.58)
IMM GRANULOCYTES NFR BLD: 0 % (ref 0–5)
INR PPP: 1
LDLC SERPL CALC-MCNC: 77 MG/DL
LYMPHOCYTES NFR BLD: 1.07 K/UL (ref 1.5–4)
LYMPHOCYTES RELATIVE PERCENT: 10 % (ref 20–42)
MAGNESIUM SERPL-MCNC: 1.9 MG/DL (ref 1.6–2.4)
MCH RBC QN AUTO: 28.9 PG (ref 26–35)
MCHC RBC AUTO-ENTMCNC: 30.7 G/DL (ref 32–34.5)
MCV RBC AUTO: 94.1 FL (ref 80–99.9)
MONOCYTES NFR BLD: 0.4 K/UL (ref 0.1–0.95)
MONOCYTES NFR BLD: 4 % (ref 2–12)
NEUTROPHILS NFR BLD: 85 % (ref 43–80)
NEUTS SEG NFR BLD: 9.48 K/UL (ref 1.8–7.3)
PLATELET # BLD AUTO: 185 K/UL (ref 130–450)
PMV BLD AUTO: 10.6 FL (ref 7–12)
POTASSIUM SERPL-SCNC: 4.4 MMOL/L (ref 3.5–5.1)
PROT SERPL-MCNC: 5.6 G/DL (ref 6.4–8.3)
PROTHROMBIN TIME: 11.4 SEC (ref 9.3–12.4)
RBC # BLD AUTO: 4.78 M/UL (ref 3.5–5.5)
SARS-COV-2 RNA RESP QL NAA+PROBE: NOT DETECTED
SODIUM SERPL-SCNC: 138 MMOL/L (ref 136–145)
SOURCE: NORMAL
SPECIMEN DESCRIPTION: NORMAL
TRIGL SERPL-MCNC: 124 MG/DL
TROPONIN I SERPL HS-MCNC: 12 NG/L (ref 0–14)
TROPONIN I SERPL HS-MCNC: 14 NG/L (ref 0–14)
TSH SERPL DL<=0.05 MIU/L-ACNC: 2.63 UIU/ML (ref 0.27–4.2)
VLDLC SERPL CALC-MCNC: 25 MG/DL
WBC OTHER # BLD: 11.2 K/UL (ref 4.5–11.5)

## 2025-06-11 PROCEDURE — 82306 VITAMIN D 25 HYDROXY: CPT

## 2025-06-11 PROCEDURE — 83036 HEMOGLOBIN GLYCOSYLATED A1C: CPT

## 2025-06-11 PROCEDURE — 99285 EMERGENCY DEPT VISIT HI MDM: CPT

## 2025-06-11 PROCEDURE — 84484 ASSAY OF TROPONIN QUANT: CPT

## 2025-06-11 PROCEDURE — 83735 ASSAY OF MAGNESIUM: CPT

## 2025-06-11 PROCEDURE — 6360000004 HC RX CONTRAST MEDICATION: Performed by: RADIOLOGY

## 2025-06-11 PROCEDURE — 85610 PROTHROMBIN TIME: CPT

## 2025-06-11 PROCEDURE — 70450 CT HEAD/BRAIN W/O DYE: CPT

## 2025-06-11 PROCEDURE — 80053 COMPREHEN METABOLIC PANEL: CPT

## 2025-06-11 PROCEDURE — 96361 HYDRATE IV INFUSION ADD-ON: CPT

## 2025-06-11 PROCEDURE — 84443 ASSAY THYROID STIM HORMONE: CPT

## 2025-06-11 PROCEDURE — 6360000002 HC RX W HCPCS: Performed by: NURSE PRACTITIONER

## 2025-06-11 PROCEDURE — G0378 HOSPITAL OBSERVATION PER HR: HCPCS

## 2025-06-11 PROCEDURE — 96360 HYDRATION IV INFUSION INIT: CPT

## 2025-06-11 PROCEDURE — 87636 SARSCOV2 & INF A&B AMP PRB: CPT

## 2025-06-11 PROCEDURE — 85025 COMPLETE CBC W/AUTO DIFF WBC: CPT

## 2025-06-11 PROCEDURE — 93005 ELECTROCARDIOGRAM TRACING: CPT

## 2025-06-11 PROCEDURE — 4A03X5D MEASUREMENT OF ARTERIAL FLOW, INTRACRANIAL, EXTERNAL APPROACH: ICD-10-PCS | Performed by: INTERNAL MEDICINE

## 2025-06-11 PROCEDURE — 71275 CT ANGIOGRAPHY CHEST: CPT

## 2025-06-11 PROCEDURE — 70498 CT ANGIOGRAPHY NECK: CPT

## 2025-06-11 PROCEDURE — 93010 ELECTROCARDIOGRAM REPORT: CPT | Performed by: INTERNAL MEDICINE

## 2025-06-11 PROCEDURE — 2580000003 HC RX 258: Performed by: EMERGENCY MEDICINE

## 2025-06-11 PROCEDURE — 2580000003 HC RX 258

## 2025-06-11 PROCEDURE — 2500000003 HC RX 250 WO HCPCS: Performed by: NURSE PRACTITIONER

## 2025-06-11 PROCEDURE — 99223 1ST HOSP IP/OBS HIGH 75: CPT | Performed by: NURSE PRACTITIONER

## 2025-06-11 PROCEDURE — 74174 CTA ABD&PLVS W/CONTRAST: CPT

## 2025-06-11 PROCEDURE — 94640 AIRWAY INHALATION TREATMENT: CPT

## 2025-06-11 PROCEDURE — 70496 CT ANGIOGRAPHY HEAD: CPT

## 2025-06-11 PROCEDURE — 83880 ASSAY OF NATRIURETIC PEPTIDE: CPT

## 2025-06-11 PROCEDURE — 80061 LIPID PANEL: CPT

## 2025-06-11 PROCEDURE — 6370000000 HC RX 637 (ALT 250 FOR IP): Performed by: NURSE PRACTITIONER

## 2025-06-11 PROCEDURE — 71045 X-RAY EXAM CHEST 1 VIEW: CPT

## 2025-06-11 RX ORDER — ONDANSETRON 4 MG/1
4 TABLET, ORALLY DISINTEGRATING ORAL EVERY 8 HOURS PRN
Status: DISCONTINUED | OUTPATIENT
Start: 2025-06-11 | End: 2025-06-15 | Stop reason: HOSPADM

## 2025-06-11 RX ORDER — SODIUM CHLORIDE 9 MG/ML
INJECTION, SOLUTION INTRAVENOUS PRN
Status: DISCONTINUED | OUTPATIENT
Start: 2025-06-11 | End: 2025-06-15 | Stop reason: HOSPADM

## 2025-06-11 RX ORDER — SODIUM CHLORIDE 0.9 % (FLUSH) 0.9 %
5-40 SYRINGE (ML) INJECTION EVERY 12 HOURS SCHEDULED
Status: DISCONTINUED | OUTPATIENT
Start: 2025-06-11 | End: 2025-06-15 | Stop reason: HOSPADM

## 2025-06-11 RX ORDER — ACETAMINOPHEN 325 MG/1
650 TABLET ORAL EVERY 4 HOURS PRN
Status: DISCONTINUED | OUTPATIENT
Start: 2025-06-11 | End: 2025-06-15 | Stop reason: HOSPADM

## 2025-06-11 RX ORDER — GLUCAGON 1 MG/ML
1 KIT INJECTION PRN
Status: DISCONTINUED | OUTPATIENT
Start: 2025-06-11 | End: 2025-06-15 | Stop reason: HOSPADM

## 2025-06-11 RX ORDER — HYDROXYZINE HYDROCHLORIDE 25 MG/1
25 TABLET, FILM COATED ORAL 3 TIMES DAILY PRN
Status: DISCONTINUED | OUTPATIENT
Start: 2025-06-11 | End: 2025-06-15 | Stop reason: HOSPADM

## 2025-06-11 RX ORDER — LACTOBACILLUS RHAMNOSUS GG 10B CELL
2 CAPSULE ORAL 2 TIMES DAILY
Status: DISCONTINUED | OUTPATIENT
Start: 2025-06-11 | End: 2025-06-15 | Stop reason: HOSPADM

## 2025-06-11 RX ORDER — ATORVASTATIN CALCIUM 80 MG/1
80 TABLET, FILM COATED ORAL NIGHTLY
Status: DISCONTINUED | OUTPATIENT
Start: 2025-06-11 | End: 2025-06-15 | Stop reason: HOSPADM

## 2025-06-11 RX ORDER — ASPIRIN 325 MG
325 TABLET ORAL ONCE
Status: COMPLETED | OUTPATIENT
Start: 2025-06-11 | End: 2025-06-11

## 2025-06-11 RX ORDER — MECOBALAMIN 5000 MCG
5 TABLET,DISINTEGRATING ORAL NIGHTLY PRN
Status: DISCONTINUED | OUTPATIENT
Start: 2025-06-11 | End: 2025-06-15 | Stop reason: HOSPADM

## 2025-06-11 RX ORDER — CALCIUM CARBONATE 500 MG/1
500 TABLET, CHEWABLE ORAL 3 TIMES DAILY PRN
Status: DISCONTINUED | OUTPATIENT
Start: 2025-06-11 | End: 2025-06-15 | Stop reason: HOSPADM

## 2025-06-11 RX ORDER — DEXTROSE MONOHYDRATE 100 MG/ML
INJECTION, SOLUTION INTRAVENOUS CONTINUOUS PRN
Status: DISCONTINUED | OUTPATIENT
Start: 2025-06-11 | End: 2025-06-15 | Stop reason: HOSPADM

## 2025-06-11 RX ORDER — 0.9 % SODIUM CHLORIDE 0.9 %
1000 INTRAVENOUS SOLUTION INTRAVENOUS ONCE
Status: COMPLETED | OUTPATIENT
Start: 2025-06-11 | End: 2025-06-11

## 2025-06-11 RX ORDER — ENOXAPARIN SODIUM 100 MG/ML
40 INJECTION SUBCUTANEOUS DAILY
Status: DISCONTINUED | OUTPATIENT
Start: 2025-06-11 | End: 2025-06-11

## 2025-06-11 RX ORDER — ACETAMINOPHEN 650 MG/1
650 SUPPOSITORY RECTAL EVERY 4 HOURS PRN
Status: DISCONTINUED | OUTPATIENT
Start: 2025-06-11 | End: 2025-06-15 | Stop reason: HOSPADM

## 2025-06-11 RX ORDER — IOPAMIDOL 755 MG/ML
70 INJECTION, SOLUTION INTRAVASCULAR
Status: COMPLETED | OUTPATIENT
Start: 2025-06-11 | End: 2025-06-11

## 2025-06-11 RX ORDER — LABETALOL HYDROCHLORIDE 5 MG/ML
10 INJECTION, SOLUTION INTRAVENOUS EVERY 10 MIN PRN
Status: DISCONTINUED | OUTPATIENT
Start: 2025-06-11 | End: 2025-06-15 | Stop reason: HOSPADM

## 2025-06-11 RX ORDER — BENZONATATE 100 MG/1
100 CAPSULE ORAL 3 TIMES DAILY PRN
Status: DISCONTINUED | OUTPATIENT
Start: 2025-06-11 | End: 2025-06-15 | Stop reason: HOSPADM

## 2025-06-11 RX ORDER — FUROSEMIDE 40 MG/1
40 TABLET ORAL DAILY
Status: DISCONTINUED | OUTPATIENT
Start: 2025-06-12 | End: 2025-06-14

## 2025-06-11 RX ORDER — ASPIRIN 81 MG/1
81 TABLET, CHEWABLE ORAL DAILY
Status: DISCONTINUED | OUTPATIENT
Start: 2025-06-12 | End: 2025-06-15 | Stop reason: HOSPADM

## 2025-06-11 RX ORDER — POLYETHYLENE GLYCOL 3350 17 G/17G
17 POWDER, FOR SOLUTION ORAL DAILY PRN
Status: DISCONTINUED | OUTPATIENT
Start: 2025-06-11 | End: 2025-06-15 | Stop reason: HOSPADM

## 2025-06-11 RX ORDER — SODIUM CHLORIDE 0.9 % (FLUSH) 0.9 %
5-40 SYRINGE (ML) INJECTION PRN
Status: DISCONTINUED | OUTPATIENT
Start: 2025-06-11 | End: 2025-06-15 | Stop reason: HOSPADM

## 2025-06-11 RX ORDER — IOPAMIDOL 755 MG/ML
75 INJECTION, SOLUTION INTRAVASCULAR
Status: COMPLETED | OUTPATIENT
Start: 2025-06-11 | End: 2025-06-11

## 2025-06-11 RX ORDER — MECOBALAMIN 5000 MCG
5 TABLET,DISINTEGRATING ORAL EVERY EVENING
Status: DISCONTINUED | OUTPATIENT
Start: 2025-06-11 | End: 2025-06-15 | Stop reason: HOSPADM

## 2025-06-11 RX ORDER — ASPIRIN 300 MG/1
300 SUPPOSITORY RECTAL DAILY
Status: DISCONTINUED | OUTPATIENT
Start: 2025-06-12 | End: 2025-06-15 | Stop reason: HOSPADM

## 2025-06-11 RX ORDER — BISACODYL 10 MG
10 SUPPOSITORY, RECTAL RECTAL DAILY
Status: DISCONTINUED | OUTPATIENT
Start: 2025-06-11 | End: 2025-06-15 | Stop reason: HOSPADM

## 2025-06-11 RX ORDER — ONDANSETRON 2 MG/ML
4 INJECTION INTRAMUSCULAR; INTRAVENOUS EVERY 6 HOURS PRN
Status: DISCONTINUED | OUTPATIENT
Start: 2025-06-11 | End: 2025-06-15 | Stop reason: HOSPADM

## 2025-06-11 RX ORDER — FUROSEMIDE 20 MG/1
20 TABLET ORAL DAILY
Status: DISCONTINUED | OUTPATIENT
Start: 2025-06-11 | End: 2025-06-11

## 2025-06-11 RX ADMIN — SODIUM CHLORIDE, PRESERVATIVE FREE 10 ML: 5 INJECTION INTRAVENOUS at 21:25

## 2025-06-11 RX ADMIN — ASPIRIN 325 MG: 325 TABLET ORAL at 14:19

## 2025-06-11 RX ADMIN — IOPAMIDOL 70 ML: 755 INJECTION, SOLUTION INTRAVENOUS at 14:50

## 2025-06-11 RX ADMIN — SODIUM CHLORIDE 1000 ML: 9 INJECTION, SOLUTION INTRAVENOUS at 16:08

## 2025-06-11 RX ADMIN — ARFORMOTEROL TARTRATE: 15 SOLUTION RESPIRATORY (INHALATION) at 20:38

## 2025-06-11 RX ADMIN — IOPAMIDOL 75 ML: 755 INJECTION, SOLUTION INTRAVENOUS at 17:34

## 2025-06-11 RX ADMIN — SODIUM CHLORIDE 1000 ML: 0.9 INJECTION, SOLUTION INTRAVENOUS at 11:02

## 2025-06-11 RX ADMIN — ATORVASTATIN CALCIUM 80 MG: 80 TABLET, FILM COATED ORAL at 22:21

## 2025-06-11 ASSESSMENT — LIFESTYLE VARIABLES
HOW MANY STANDARD DRINKS CONTAINING ALCOHOL DO YOU HAVE ON A TYPICAL DAY: PATIENT DOES NOT DRINK
HOW OFTEN DO YOU HAVE A DRINK CONTAINING ALCOHOL: NEVER

## 2025-06-11 NOTE — H&P
Hospitalist History & Physical      PCP: Henri Luong MD    Date of Service: Pt seen/examined on 6/11/2025     Chief Complaint:  had concerns including Transient Ischemic Attack (Patient coming from home, family called for Altered mental status, upon arrival EMS noted left sided facial droop, left sided weakness, slurred speech. Symptoms subsided in EMS squad. Hx of CVA  ).    History of Present Illness:    Ms. Regi Chacon, a 80 y.o. year old female  who  has a past medical history of Fluid retention in legs.     Patient presented to the ED w/ complaints of AMS.  Per EMS she was also exhibiting stroke like sx on their arrival w/ left-sided facial droop, left-sided weakness, and slurred speech.  The symptoms resolved prior to arrival.  She does have a history of CVA. The patient herself states she had a typical morning and got up and went to the bathroom and then got some coffee.  She sat down at the table to drink her coffee when she became extremely lightheaded and diaphoretic and was incontinent of bowel.  She denies any chest pain, palpitations, SOB, N/V.  This has never happened to her before.  She started feeling better once she got to ER. She is compliant with her home medications and tells me she's on a baby aspirin, a blood thinner, and a cholesterol medicine every since her stroke last year.  She has no deficits from her previous stroke.     ER COURSE:  In ED patient was bradycardic in the 50s.  Vitals otherwise stable on baseline 2 L nasal cannula. Laboratory workup significant for elevated BNP.  EKG reviewed and ST without signs of ischemia.  CXR reviewed without any obvious infiltrate, effusion, pulmonary edema.  CTh without acute abnormalities.  She was given 1 L IVFB in ED.    PREVIOUS HOSPITALIZATION:  Admission from 10/16/24-10/19/24 at [facility] reviewed.  Patient admitted for strokelike symptoms and evaluated by neurology who believe patient to be having TIA.  She was  TABS tablet Take 1 tablet by mouth daily 11/7/22   Aarti Andre MD   Ergocalciferol (VITAMIN D) 50135 units CAPS Take 50,000 Units by mouth once a week 11/10/22   Aarti Andre MD       Allergies:  Patient has no known allergies.    Social History:    RESIDENCE: Private w/ dtr   TOBACCO:   reports that she has been smoking. She has a 17.5 pack-year smoking history. She has never used smokeless tobacco.  ETOH:   reports no history of alcohol use.    Family History:      No family history on file.    Review of Systems:  All bolded are positive; please see HPI  General:  Fever, chills, diaphoresis, fatigue, malaise, night sweats, weight loss  Psychological:  Anxiety, disorientation, hallucinations.  ENT:  Epistaxis, headaches, vertigo, visual changes.  Cardiovascular:  Chest pain, irregular heartbeats, palpitations, orthopnea, HARTMAN  Respiratory:  Shortness of breath, coughing, sputum production, hemoptysis, wheezing  Gastrointestinal:  Nausea, vomiting, diarrhea, heartburn, constipation, abdominal pain, hematemesis, hematochezia, melena  Genito-Urinary:  Dysuria, urgency, frequency, hematuria  Musculoskeletal:  Joint pain, joint stiffness, joint swelling, muscle pain, back pain  Neurology:  Headache, focal neurological deficits, weakness, numbness, paresthesia, dizziness, lightheadedness   Derm:  Rashes, ulcers, excoriations, bruising  Extremities:  Decreased ROM, peripheral edema, mottling    Physical Exam:  BP (!) 161/67   Pulse 56   Temp 97.2 °F (36.2 °C)   Resp 18   SpO2 98%   General appearance: Pleasant elderly female in no apparent distress, appears stated age and cooperative.  HEENT: Conjunctivae/corneas clear. Mucous membranes moist.  Respiratory:  Clear to auscultation though diminished in the bases bilaterally.  Normal respiratory effort.   Cardiovascular:  RRR. S1, S2 without MRG.  PV: Pulses palpable. +3 pitting edema of BLE.   Abdomen: Soft, non-tender, non-distended. +BS  Musculoskeletal:

## 2025-06-11 NOTE — ED NOTES
Patient arrived covered in dry urine and stool. Staff cleaned patient ans clean linens provided. Patient adjusted in bed. Purewick in place.

## 2025-06-11 NOTE — ED PROVIDER NOTES
[facility] EMERGENCY DEPARTMENT  EMERGENCY DEPARTMENT ENCOUNTER        Pt Name: Regi Chacon  MRN: [id number]  Birthdate 1945  Date of evaluation: 6/11/2025  Provider: Carol Martinez MD  PCP: Henri Luong MD  Note Started: 11:04 AM EDT 6/11/25    CHIEF COMPLAINT       Chief Complaint   Patient presents with    Transient Ischemic Attack     Patient coming from home, family called for Altered mental status, upon arrival EMS noted left sided facial droop, left sided weakness, slurred speech. Symptoms subsided in EMS squad. Hx of CVA         HISTORY OF PRESENT ILLNESS: 1 or more Elements   History From: Patient, EMS    Limitations to history : None    Regi Chacon is a 80 y.o. female who presents via EMS from home for left-sided weakness. Pt has past medical history of asthma/chronic respiratory failure, COPD, on home O2 2 L, hard of hearing, smoker, CVA.  Last known well was 9 AM.  Patient lives with daughter.  Patient states she woke up and felt fine this morning.  Patient states she had her coffee and had a cigarette.  Patient reports going to the restroom when she suddenly felt lightheaded.  Patient states she had bowel bladder incontinence.  Patient denies fall.  Daughter had stated that patient was experiencing a left-sided facial droop and left-sided weakness.  Per EMS, patient had left sided weakness on their arrival.  Symptoms resolved en route.  Patient notes that she still feels slightly lightheaded otherwise has no complaints at this time.  Not on any anticoagulants.    Patient denies fever, chills, headache, shortness of breath, chest pain, abdominal pain, nausea, vomiting, diarrhea, dysuria, hematuria, hematochezia, and melena.    Nursing Notes were all reviewed and agreed with or any disagreements were addressed in the HPI.      REVIEW OF EXTERNAL NOTES :      10/16/2024: Pt had an acute CVA at .  Patient appeared to have similar symptoms at that  time.    REVIEW OF SYSTEMS :      Positives and Pertinent negatives as per HPI.     SURGICAL HISTORY     Past Surgical History:   Procedure Laterality Date    DENTAL SURGERY      full mouth with dentures    HIP FRACTURE SURGERY Right 3/2/2023    RIGHT HIP CEPHALOMEDULLARY NAIL FOR RIGHT INTERTROCHANTERIC HIP FRACTURE performed by Mikie Brown MD at [facility] OR    TONSILLECTOMY      age 7     TUBAL LIGATION      43 years ago     TUBAL LIGATION         CURRENTMEDICATIONS       Previous Medications    ACETAMINOPHEN (TYLENOL) 500 MG TABLET    Take 2 tablets by mouth every 6 hours as needed for Fever or Pain    ALBUTEROL SULFATE HFA (PROVENTIL;VENTOLIN;PROAIR) 108 (90 BASE) MCG/ACT INHALER    Inhale 2 puffs into the lungs every 4 hours as needed for Wheezing or Shortness of Breath    ASPIRIN 325 MG EC TABLET    Take 1 tablet by mouth 2 times daily for 28 days    BISACODYL (DULCOLAX) 10 MG SUPPOSITORY    Place 1 suppository rectally daily    DICLOFENAC SODIUM (VOLTAREN) 1 % GEL    Apply 2 g topically 2 times daily    ERGOCALCIFEROL (VITAMIN D) 50630 UNITS CAPS    Take 50,000 Units by mouth once a week    FLUTICASONE-VILANTEROL (BREO ELLIPTA) 100-25 MCG/INH AEPB INHALER    Inhale 2 puffs into the lungs daily    FUROSEMIDE (LASIX) 20 MG TABLET    Take 1 tablet by mouth daily    LACTOBACILLUS (CULTURELLE) CAPS CAPSULE    Take 2 capsules by mouth 2 times daily    MAGNESIUM HYDROXIDE (MILK OF MAGNESIA) 400 MG/5ML SUSPENSION    Take by mouth daily as needed for Constipation    MULTIPLE VITAMIN (MULTIVITAMIN) TABS TABLET    Take 1 tablet by mouth daily    VITAMIN D (CHOLECALCIFEROL) 50 MCG (2000 UT) TABS TABLET    Take 1 tablet by mouth daily       ALLERGIES     Patient has no known allergies.    FAMILYHISTORY     No family history on file.     SOCIAL HISTORY       Social History     Tobacco Use    Smoking status: Every Day     Current packs/day: 0.50     Average packs/day: 0.5 packs/day for 35.0 years (17.5 ttl pk-yrs)  distal to   the origins of the ascending great vessels.   CTA HEAD:      1. Small caliber right posterior communicating artery with small caliber P1   and P2 segments of the right PCA.  This may represent atherosclerosis or   vasculitis.   2. No additional stenoses or occlusions of the rest of the proximal   intracranial circulation.         CTA NECK W CONTRAST   Final Result   CTA NECK:      1. No significant stenosis of the carotid or vertebral arteries in the neck.   2. Moderate thrombus along the superior portion of the aortic arch distal to   the origins of the ascending great vessels.   CTA HEAD:      1. Small caliber right posterior communicating artery with small caliber P1   and P2 segments of the right PCA.  This may represent atherosclerosis or   vasculitis.   2. No additional stenoses or occlusions of the rest of the proximal   intracranial circulation.         CT Head W/O Contrast   Final Result   No acute intracranial abnormality.         XR CHEST PORTABLE   Final Result   No acute process.           No results found.    No results found.    PROCEDURES   Unless otherwise noted below, none        PAST MEDICAL HISTORY/Chronic Conditions Affecting Care      has a past medical history of Fluid retention in legs.     EMERGENCY DEPARTMENT COURSE    Vitals:    Vitals:    06/11/25 2018 06/11/25 2019 06/11/25 2039 06/11/25 2040   BP:       Pulse:  55 56 57   Resp:  17 12 (!) 6   Temp:       SpO2:  98% 99% 100%   Weight: 70.8 kg (156 lb)      Height: 1.702 m (5' 7\")          Patient was given the following medications:  Medications   bisacodyl (DULCOLAX) suppository 10 mg (10 mg Rectal Not Given 6/11/25 1443)   lactobacillus (CULTURELLE) capsule 2 capsule (2 capsules Oral Not Given 6/11/25 1553)   melatonin disintegrating tablet 5 mg (5 mg Oral Not Given 6/11/25 1813)   melatonin disintegrating tablet 5 mg (has no administration in time range)   magnesium-glycerin-water (1-2-3) enema (has no administration in

## 2025-06-12 ENCOUNTER — APPOINTMENT (OUTPATIENT)
Age: 80
DRG: 069 | End: 2025-06-12
Payer: MEDICARE

## 2025-06-12 PROBLEM — I73.9 PVD (PERIPHERAL VASCULAR DISEASE) WITH CLAUDICATION: Status: ACTIVE | Noted: 2025-06-12

## 2025-06-12 LAB
ANION GAP SERPL CALCULATED.3IONS-SCNC: 9 MMOL/L (ref 7–16)
BASOPHILS # BLD: 0 K/UL (ref 0–0.2)
BASOPHILS NFR BLD: 0 % (ref 0–2)
BUN SERPL-MCNC: 18 MG/DL (ref 8–23)
CALCIUM SERPL-MCNC: 8.4 MG/DL (ref 8.8–10.2)
CHLORIDE SERPL-SCNC: 109 MMOL/L (ref 98–107)
CO2 SERPL-SCNC: 20 MMOL/L (ref 22–29)
CREAT SERPL-MCNC: 0.6 MG/DL (ref 0.5–1)
ECHO AO ASC DIAM: 2.3 CM
ECHO AO ASCENDING AORTA INDEX: 1.26 CM/M2
ECHO AV AREA PEAK VELOCITY: 1.7 CM2
ECHO AV AREA VTI: 1.8 CM2
ECHO AV AREA/BSA PEAK VELOCITY: 0.9 CM2/M2
ECHO AV AREA/BSA VTI: 1 CM2/M2
ECHO AV MEAN GRADIENT: 9 MMHG
ECHO AV MEAN VELOCITY: 1.4 M/S
ECHO AV PEAK GRADIENT: 18 MMHG
ECHO AV PEAK VELOCITY: 2.1 M/S
ECHO AV VELOCITY RATIO: 0.52
ECHO AV VTI: 47.9 CM
ECHO BSA: 1.84 M2
ECHO EST RA PRESSURE: 3 MMHG
ECHO LA DIAMETER INDEX: 2.51 CM/M2
ECHO LA DIAMETER: 4.6 CM
ECHO LA VOL A-L A2C: 66 ML (ref 22–52)
ECHO LA VOL A-L A4C: 66 ML (ref 22–52)
ECHO LA VOL BP: 64 ML (ref 22–52)
ECHO LA VOL MOD A2C: 65 ML (ref 22–52)
ECHO LA VOL MOD A4C: 63 ML (ref 22–52)
ECHO LA VOL/BSA BIPLANE: 35 ML/M2 (ref 16–34)
ECHO LA VOLUME AREA LENGTH: 68 ML
ECHO LA VOLUME INDEX A-L A2C: 36 ML/M2 (ref 16–34)
ECHO LA VOLUME INDEX A-L A4C: 36 ML/M2 (ref 16–34)
ECHO LA VOLUME INDEX AREA LENGTH: 37 ML/M2 (ref 16–34)
ECHO LA VOLUME INDEX MOD A2C: 36 ML/M2 (ref 16–34)
ECHO LA VOLUME INDEX MOD A4C: 34 ML/M2 (ref 16–34)
ECHO LV EF PHYSICIAN: 65 %
ECHO LV FRACTIONAL SHORTENING: 37 % (ref 28–44)
ECHO LV INTERNAL DIMENSION DIASTOLE INDEX: 2.68 CM/M2
ECHO LV INTERNAL DIMENSION DIASTOLIC: 4.9 CM (ref 3.9–5.3)
ECHO LV INTERNAL DIMENSION SYSTOLIC INDEX: 1.69 CM/M2
ECHO LV INTERNAL DIMENSION SYSTOLIC: 3.1 CM
ECHO LV ISOVOLUMETRIC RELAXATION TIME (IVRT): 99 MS
ECHO LV IVSD: 0.9 CM (ref 0.6–0.9)
ECHO LV IVSS: 1.5 CM
ECHO LV MASS 2D: 164.3 G (ref 67–162)
ECHO LV MASS INDEX 2D: 89.8 G/M2 (ref 43–95)
ECHO LV POSTERIOR WALL DIASTOLIC: 1 CM (ref 0.6–0.9)
ECHO LV POSTERIOR WALL SYSTOLIC: 1.5 CM
ECHO LV RELATIVE WALL THICKNESS RATIO: 0.41
ECHO LVOT AREA: 3.5 CM2
ECHO LVOT AV VTI INDEX: 0.55
ECHO LVOT DIAM: 2.1 CM
ECHO LVOT MEAN GRADIENT: 3 MMHG
ECHO LVOT PEAK GRADIENT: 5 MMHG
ECHO LVOT PEAK VELOCITY: 1.1 M/S
ECHO LVOT STROKE VOLUME INDEX: 50.1 ML/M2
ECHO LVOT SV: 91.7 ML
ECHO LVOT VTI: 26.5 CM
ECHO MV "A" WAVE DURATION: 114.2 MSEC
ECHO MV A VELOCITY: 0.86 M/S
ECHO MV AREA PHT: 2.6 CM2
ECHO MV AREA VTI: 2.8 CM2
ECHO MV E DECELERATION TIME (DT): 351.1 MS
ECHO MV E VELOCITY: 0.83 M/S
ECHO MV E/A RATIO: 0.97
ECHO MV LVOT VTI INDEX: 1.22
ECHO MV MAX VELOCITY: 1.1 M/S
ECHO MV MEAN GRADIENT: 2 MMHG
ECHO MV MEAN VELOCITY: 0.6 M/S
ECHO MV PEAK GRADIENT: 4 MMHG
ECHO MV PRESSURE HALF TIME (PHT): 86.2 MS
ECHO MV VTI: 32.3 CM
ECHO PV MAX VELOCITY: 1.1 M/S
ECHO PV MEAN GRADIENT: 3 MMHG
ECHO PV MEAN VELOCITY: 0.8 M/S
ECHO PV PEAK GRADIENT: 5 MMHG
ECHO PV VTI: 23.4 CM
ECHO PVEIN A DURATION: 163.7 MS
ECHO PVEIN A VELOCITY: 0.3 M/S
ECHO PVEIN PEAK D VELOCITY: 0.4 M/S
ECHO PVEIN PEAK S VELOCITY: 0.6 M/S
ECHO PVEIN S/D RATIO: 1.5
ECHO RIGHT VENTRICULAR SYSTOLIC PRESSURE (RVSP): 33 MMHG
ECHO RV INTERNAL DIMENSION: 4.1 CM
ECHO RV LONGITUDINAL DIMENSION: 6.2 CM
ECHO RV MID DIMENSION: 3.5 CM
ECHO TV REGURGITANT MAX VELOCITY: 2.75 M/S
ECHO TV REGURGITANT PEAK GRADIENT: 30 MMHG
EOSINOPHIL # BLD: 0.72 K/UL (ref 0.05–0.5)
EOSINOPHILS RELATIVE PERCENT: 6 % (ref 0–6)
ERYTHROCYTE [DISTWIDTH] IN BLOOD BY AUTOMATED COUNT: 15.1 % (ref 11.5–15)
GFR, ESTIMATED: >90 ML/MIN/1.73M2
GLUCOSE SERPL-MCNC: 118 MG/DL (ref 74–99)
HCT VFR BLD AUTO: 42.7 % (ref 34–48)
HGB BLD-MCNC: 13.4 G/DL (ref 11.5–15.5)
LYMPHOCYTES NFR BLD: 0.31 K/UL (ref 1.5–4)
LYMPHOCYTES RELATIVE PERCENT: 3 % (ref 20–42)
MCH RBC QN AUTO: 28.6 PG (ref 26–35)
MCHC RBC AUTO-ENTMCNC: 31.4 G/DL (ref 32–34.5)
MCV RBC AUTO: 91.2 FL (ref 80–99.9)
MONOCYTES NFR BLD: 0.1 K/UL (ref 0.1–0.95)
MONOCYTES NFR BLD: 1 % (ref 2–12)
NEUTROPHILS NFR BLD: 90 % (ref 43–80)
NEUTS SEG NFR BLD: 10.57 K/UL (ref 1.8–7.3)
PLATELET # BLD AUTO: 173 K/UL (ref 130–450)
PMV BLD AUTO: 10.9 FL (ref 7–12)
POTASSIUM SERPL-SCNC: 4.2 MMOL/L (ref 3.5–5.1)
RBC # BLD AUTO: 4.68 M/UL (ref 3.5–5.5)
RBC # BLD: ABNORMAL 10*6/UL
SODIUM SERPL-SCNC: 139 MMOL/L (ref 136–145)
WBC OTHER # BLD: 11.7 K/UL (ref 4.5–11.5)

## 2025-06-12 PROCEDURE — G0378 HOSPITAL OBSERVATION PER HR: HCPCS | Performed by: INTERNAL MEDICINE

## 2025-06-12 PROCEDURE — 96372 THER/PROPH/DIAG INJ SC/IM: CPT

## 2025-06-12 PROCEDURE — 93306 TTE W/DOPPLER COMPLETE: CPT

## 2025-06-12 PROCEDURE — 97165 OT EVAL LOW COMPLEX 30 MIN: CPT

## 2025-06-12 PROCEDURE — 2500000003 HC RX 250 WO HCPCS: Performed by: NURSE PRACTITIONER

## 2025-06-12 PROCEDURE — G0378 HOSPITAL OBSERVATION PER HR: HCPCS

## 2025-06-12 PROCEDURE — 6360000002 HC RX W HCPCS: Performed by: NURSE PRACTITIONER

## 2025-06-12 PROCEDURE — 6370000000 HC RX 637 (ALT 250 FOR IP): Performed by: INTERNAL MEDICINE

## 2025-06-12 PROCEDURE — 97161 PT EVAL LOW COMPLEX 20 MIN: CPT

## 2025-06-12 PROCEDURE — 99223 1ST HOSP IP/OBS HIGH 75: CPT | Performed by: INTERNAL MEDICINE

## 2025-06-12 PROCEDURE — 99232 SBSQ HOSP IP/OBS MODERATE 35: CPT | Performed by: INTERNAL MEDICINE

## 2025-06-12 PROCEDURE — 36415 COLL VENOUS BLD VENIPUNCTURE: CPT

## 2025-06-12 PROCEDURE — 80048 BASIC METABOLIC PNL TOTAL CA: CPT

## 2025-06-12 PROCEDURE — 99204 OFFICE O/P NEW MOD 45 MIN: CPT

## 2025-06-12 PROCEDURE — 93306 TTE W/DOPPLER COMPLETE: CPT | Performed by: INTERNAL MEDICINE

## 2025-06-12 PROCEDURE — 92523 SPEECH SOUND LANG COMPREHEN: CPT

## 2025-06-12 PROCEDURE — 94640 AIRWAY INHALATION TREATMENT: CPT

## 2025-06-12 PROCEDURE — 94664 DEMO&/EVAL PT USE INHALER: CPT

## 2025-06-12 PROCEDURE — 97530 THERAPEUTIC ACTIVITIES: CPT

## 2025-06-12 PROCEDURE — 97535 SELF CARE MNGMENT TRAINING: CPT

## 2025-06-12 PROCEDURE — 92610 EVALUATE SWALLOWING FUNCTION: CPT

## 2025-06-12 PROCEDURE — 2700000000 HC OXYGEN THERAPY PER DAY

## 2025-06-12 PROCEDURE — 6370000000 HC RX 637 (ALT 250 FOR IP): Performed by: NURSE PRACTITIONER

## 2025-06-12 PROCEDURE — 85025 COMPLETE CBC W/AUTO DIFF WBC: CPT

## 2025-06-12 PROCEDURE — 6360000002 HC RX W HCPCS: Performed by: INTERNAL MEDICINE

## 2025-06-12 PROCEDURE — 99222 1ST HOSP IP/OBS MODERATE 55: CPT | Performed by: PSYCHIATRY & NEUROLOGY

## 2025-06-12 PROCEDURE — APPSS30 APP SPLIT SHARED TIME 16-30 MINUTES

## 2025-06-12 RX ORDER — CLOPIDOGREL BISULFATE 75 MG/1
75 TABLET ORAL DAILY
COMMUNITY

## 2025-06-12 RX ORDER — CLOPIDOGREL BISULFATE 75 MG/1
75 TABLET ORAL DAILY
Status: DISCONTINUED | OUTPATIENT
Start: 2025-06-12 | End: 2025-06-15 | Stop reason: HOSPADM

## 2025-06-12 RX ORDER — ASPIRIN 81 MG/1
81 TABLET, CHEWABLE ORAL DAILY
COMMUNITY

## 2025-06-12 RX ORDER — ENOXAPARIN SODIUM 100 MG/ML
40 INJECTION SUBCUTANEOUS DAILY
Status: DISCONTINUED | OUTPATIENT
Start: 2025-06-12 | End: 2025-06-15 | Stop reason: HOSPADM

## 2025-06-12 RX ADMIN — Medication 2 CAPSULE: at 20:04

## 2025-06-12 RX ADMIN — ARFORMOTEROL TARTRATE: 15 SOLUTION RESPIRATORY (INHALATION) at 08:15

## 2025-06-12 RX ADMIN — ARFORMOTEROL TARTRATE: 15 SOLUTION RESPIRATORY (INHALATION) at 19:51

## 2025-06-12 RX ADMIN — ENOXAPARIN SODIUM 40 MG: 100 INJECTION SUBCUTANEOUS at 13:46

## 2025-06-12 RX ADMIN — SODIUM CHLORIDE, PRESERVATIVE FREE 10 ML: 5 INJECTION INTRAVENOUS at 10:34

## 2025-06-12 RX ADMIN — ATORVASTATIN CALCIUM 80 MG: 80 TABLET, FILM COATED ORAL at 20:04

## 2025-06-12 RX ADMIN — SODIUM CHLORIDE, PRESERVATIVE FREE 10 ML: 5 INJECTION INTRAVENOUS at 20:03

## 2025-06-12 RX ADMIN — Medication 5 MG: at 17:37

## 2025-06-12 RX ADMIN — CLOPIDOGREL BISULFATE 75 MG: 75 TABLET, FILM COATED ORAL at 12:11

## 2025-06-12 RX ADMIN — Medication 2 CAPSULE: at 11:22

## 2025-06-12 RX ADMIN — ASPIRIN 81 MG CHEWABLE TABLET 81 MG: 81 TABLET CHEWABLE at 10:34

## 2025-06-12 NOTE — CONSULTS
Inpatient Cardiology Consultation      Reason for Consult: Near syncopal    Consulting Physician: Dr. Calel    Requesting Physician: Dr. Ayala    Date of Consultation: 6/12/2025    History of Present Illness:   Regi Chacon  is a 80 y.o. year old female previously unknown to [facility] cardiology.    Patient presented to the ED on 6/11/2025 with complaints of altered mental status, left-sided facial droop, left-sided weakness, slurred speech.  Symptoms subsided en route to the hospital.  Patient reportedly lives with her daughter.  Daughter states that patient woke up and felt fine that morning and had her coffee and cigarette however when she went to the restroom she felt suddenly lightheaded and had an episode of bowel/bladder incontinence and then began experiencing left-sided facial droop and left-sided weakness.  Patient had previous symptoms in October 2024 when she had a CVA at [facility].  CTA chest reveals infrarenal abdominal aortic aneurysm measuring up to 4.1 cm however not significantly changed from previous study.  CTA head/neck reveals moderate thrombus along the superior portion of the aortic arch distal to the origins of the ascending great vessels with small caliber right posterior communicating artery with small caliber P1 and P2 segments of the right PCA which may represent atherosclerosis or vasculitis.  Vascular surgery consulted for findings of thrombus in the aortic arch.  In the ED she had positive orthostatic blood pressures.  In the ED she received 2 L normal saline bolus and 325 mg aspirin.  Cardiology consulted for orthostatic hypotension    Patient is lying in bed and appears to be in no acute distress.  She reports that yesterday she had sudden onset of left-sided facial droop, weakness with loss of bowel function.  She denies any syncope.  Patient reports that she has not seen a cardiologist in the past.  She denies chest pain, palpitations, shortness of breath,  PRN  dextrose bolus 10% 125 mL, 125 mL, IntraVENous, PRN **OR** dextrose bolus 10% 250 mL, 250 mL, IntraVENous, PRN  glucagon injection 1 mg, 1 mg, SubCUTAneous, PRN  dextrose 10 % infusion, , IntraVENous, Continuous PRN  sodium chloride flush 0.9 % injection 5-40 mL, 5-40 mL, IntraVENous, 2 times per day  sodium chloride flush 0.9 % injection 5-40 mL, 5-40 mL, IntraVENous, PRN  0.9 % sodium chloride infusion, , IntraVENous, PRN  ondansetron (ZOFRAN-ODT) disintegrating tablet 4 mg, 4 mg, Oral, Q8H PRN **OR** ondansetron (ZOFRAN) injection 4 mg, 4 mg, IntraVENous, Q6H PRN  polyethylene glycol (GLYCOLAX) packet 17 g, 17 g, Oral, Daily PRN  acetaminophen (TYLENOL) tablet 650 mg, 650 mg, Oral, Q4H PRN **OR** acetaminophen (TYLENOL) suppository 650 mg, 650 mg, Rectal, Q4H PRN  atorvastatin (LIPITOR) tablet 80 mg, 80 mg, Oral, Nightly  aspirin chewable tablet 81 mg, 81 mg, Oral, Daily **OR** aspirin suppository 300 mg, 300 mg, Rectal, Daily  labetalol (NORMODYNE;TRANDATE) injection 10 mg, 10 mg, IntraVENous, Q10 Min PRN  furosemide (LASIX) tablet 40 mg, 40 mg, Oral, Daily  arformoterol 15 mcg-budesonide 0.25 mg neb solution, , Nebulization, BID RT    Allergies:  Patient has no known allergies.    Social History:    Patient lives at home with her daughter.  She reports smoking approximately 8 cigarettes daily (0.5 PPD.  Patient reports that she does plan on stopping after this admission.  Denies alcohol use or illicit drug use.    Family History:   No family history on file.    Review of Systems:   As per HPI    PHYSICAL EXAM:   /64   Pulse 56   Temp 98.4 °F (36.9 °C) (Oral)   Resp 20   Ht 1.702 m (5' 7\")   Wt 71.9 kg (158 lb 9 oz)   SpO2 100%   BMI 24.83 kg/m²   CONST:  Awake, alert and cooperative. No apparent distress.   HEENT: Normocephalic, atraumatic. PERRLA. No JVD. No carotid bruit.   CHEST: Chest symmetrical and non-tender to palpation.   RESPIRATORY: Clear to auscultation bilaterally. No accessory

## 2025-06-12 NOTE — CONSULTS
Vascular Surgery Consultation Note    Reason for Consult:  AAA, Thrombus to aortic arch distal to ascending great vessels     HPI : This is a 80 y.o. female admitted on 6/11/2025 10:46 AM with weakness and loss of bowel and bladder function.  Pt states she was up walking when she became very weak and loss function of her bowel and bladder.  It was body wide weakness and she was sweating with chills.  Pt with a history of CVA in the past.    In the ED a CTA abdomen was done showing a 4.1 cm AAA with intraluminal thrombus.  Pt was unaware she had an aneurysm.  CT scan from 2022 reviewed showing a 3.5 cm AAA.  She has never seen vascular surgery before or had interventions.  She denies abdominal pain.  She has chronic back pain.     Pt is not diabetic.  She is a lifelong smoker, down to 8 cigarettes per day.  Pt is chronically on 2L O2 continuously.  She ambulates with a walker.  She states she can walk about 50 feet before having to stop due to cramping in the right calf.  She rests and can walk again.     Vascular surgery is consulted in regards to AAA.      ROS : All others Negative if blank [], Positive if [x]  General Urinary   [] Fevers [] Hematuria   [] Chills [] Dysuria   [] Weight Loss Vascular   Skin [] Claudication   [] Tissue Loss [] Rest Pain   Eyes Neurologic   [] Wears Glasses/Contacts [] Stroke/TIA   [] Vision Changes [] Focal weakness   Respiratory [] Slurred Speech    [] Shortness of breath ENT   Cardiovascular [] Difficulty swallowing   [] Chest Pain Endocrine    [] Shortness of breath with exertion [] Increased Thirst   Gastrointestinal    [] Abdominal Pain    [] Melena   [] Hematochezia         Past Medical History:   Diagnosis Date    Fluid retention in legs         Past Surgical History:   Procedure Laterality Date    DENTAL SURGERY      full mouth with dentures    HIP FRACTURE SURGERY Right 3/2/2023    RIGHT HIP CEPHALOMEDULLARY NAIL FOR RIGHT INTERTROCHANTERIC HIP FRACTURE performed by Mikie  CHRISTINA Talamantes - CNP    Pt seen and examined  Agree with above  Gabe Rogers MD

## 2025-06-12 NOTE — PROGRESS NOTES
SPEECH/LANGUAGE PATHOLOGY  SPEECH/LANGUAGE/COGNITIVE EVALUATION   and PLAN OF CARE      PATIENT NAME:  Regi Chacon  (female)     MRN:  [id number]    :  1945  (80 y.o.)  STATUS:  Emergency visit:  Room 18A  TODAY'S DATE:  2025  ORDER DATE, DESCRIPTION AND REFERRING PROVIDER:   25 1430  Speech Language Pathology (SLP) eval and treat  Start:  25 1430,   End:  25 1430,   ONE TIME,   Standing Count:  1 Occurrences,   R         Tessy, Melissa Elias, APRN - NP     REASON FOR REFERRAL: CVA  EVALUATING THERAPIST: MIRNA Dela Cruz    ADMITTING DIAGNOSIS: TIA (transient ischemic attack) [G45.9]  Stroke-like symptoms [R29.90]  Altered mental status, unspecified altered mental status type [R41.82]  Cerebrovascular accident (CVA), unspecified mechanism (HCC) [I63.9]    VISIT DIAGNOSIS:   Visit Diagnoses         Codes      TIA (transient ischemic attack)     G45.9      Altered mental status, unspecified altered mental status type     R41.82      Cerebrovascular accident (CVA), unspecified mechanism (HCC)     I63.9             SPEECH THERAPY  PLAN OF CARE   The speech therapy  POC is established based on physician order, speech pathology diagnosis and results of clinical assessment     SPEECH PATHOLOGY DIAGNOSIS:    Within function limits    Speech Pathology intervention is not warranted at this time.     Conditions Requiring Skilled Therapeutic Intervention for speech, language and/or cognition  Not applicable     Specific Speech Therapy Interventions to Include:   Not applicable    Specific instructions for next treatment:    Not applicable    SHORT/LONG TERM GOALS  Not applicable      Patient goals: Patient/family involved in developing goals and treatment plan:   Treatment goals discussed with Patient                CLINICAL ASSESSMENT:  MOTOR SPEECH       Oral Peripheral Examination   Adequate lingual/labial strength     Parameters of Speech Production  Respiration:  Adequate for speech

## 2025-06-12 NOTE — PROGRESS NOTES
[facility] Hospitalist Progress Note    Admitting Date and Time: 6/11/2025 10:46 AM  Admit Dx: TIA (transient ischemic attack) [G45.9]  Stroke-like symptoms [R29.90]  Altered mental status, unspecified altered mental status type [R41.82]  Cerebrovascular accident (CVA), unspecified mechanism (HCC) [I63.9]    Synopsis: Patient is a 80-year-old lady who has prior history of CVA presented to ED with chief complaint of altered mental status.  She was exhibiting strokelike symptoms on arrival with left-sided facial droop, left-sided weakness and slurred speech.  The symptoms resolved prior to arrival.  CT head in the ED is unremarkable.    Subjective:  Patient is being followed for TIA (transient ischemic attack) [G45.9]  Stroke-like symptoms [R29.90]  Altered mental status, unspecified altered mental status type [R41.82]  Cerebrovascular accident (CVA), unspecified mechanism (HCC) [I63.9]     She reports being hungry.  States she has not eaten food since yesterday.  Otherwise she denies any acute complaints..      ROS: denies fever, chills, cp, sob, n/v, HA unless stated above.      bisacodyl  10 mg Rectal Daily    lactobacillus  2 capsule Oral BID    melatonin  5 mg Oral QPM    sodium chloride flush  5-40 mL IntraVENous 2 times per day    atorvastatin  80 mg Oral Nightly    aspirin  81 mg Oral Daily    Or    aspirin  300 mg Rectal Daily    furosemide  40 mg Oral Daily    arformoterol 15 mcg-budesonide 0.25 mg neb solution   Nebulization BID RT     melatonin, 5 mg, Nightly PRN  magnesium - glycerin - water, , Once PRN  calcium carbonate, 500 mg, TID PRN  Polyvinyl Alcohol-Povidone PF, 1 drop, PRN  sodium chloride, 1 spray, PRN  magnesium hydroxide, 30 mL, Daily PRN  benzocaine-menthol, 1 lozenge, Q2H PRN  hydrOXYzine HCl, 25 mg, TID PRN  benzonatate, 100 mg, TID PRN  glucose, 4 tablet, PRN  dextrose bolus, 125 mL, PRN   Or  dextrose bolus, 250 mL, PRN  glucagon (rDNA), 1 mg, PRN  dextrose, , Continuous

## 2025-06-12 NOTE — PROGRESS NOTES
Physical Therapy  Physical Therapy Initial Assessment    Name: Regi Chacon  : 1945  MRN: [id number]      Date of Service: 2025    Evaluating PT:  Brian Reilly, PT, DPT [id number]    Room #:  8503/8503-A  Diagnosis:  TIA (transient ischemic attack) [G45.9]  Stroke-like symptoms [R29.90]  Altered mental status, unspecified altered mental status type [R41.82]  Cerebrovascular accident (CVA), unspecified mechanism (HCC) [I63.9]  PMHx/PSHx:   has a past medical history of Fluid retention in legs.   has a past surgical history that includes Tubal ligation; Tonsillectomy; Dental surgery; Tubal ligation; and Hip fracture surgery (Right, 3/2/2023).  Procedure/Surgery:  N/A  Precautions:  falls, O2, [place]   Equipment Owned:  Cane, Rollator  Equipment Needs:  TBD     SUBJECTIVE:    Pt lives between granddaughter and daughter's mobile homes. Granddaughter's mobile home has 4 steps and 1 handrail to enter. Daughter's mobile home has 4+1 step and 2 handrails to enter.  Pt ambulated with cane for short distances and rollator for longer distances. Pt is on 2L/min O2 at home.    OBJECTIVE:   Initial Evaluation  Date: 25 Treatment Short Term/ Long Term   Goals   AM-PAC 6 Clicks      Was pt agreeable to Eval/treatment? Yes     Does pt have pain? Yes, R ankle     Bed Mobility  Rolling: SBA  Supine to sit: SBA  Sit to supine: SBA  Scooting: SBA  Rolling: Independent  Supine to sit: Independent  Sit to supine: Independent  Scooting: Independent   Transfers Sit to stand: SBA with FWW  Stand to sit: SBA with FWW  Stand pivot: SBA with FWW  Sit to stand: Mod I  Stand to sit: Mod I  Stand pivot: Mod I with AAD   Ambulation    45 feet x2 with FWW SBA  150 feet with AAD Mod I   Stair negotiation: ascended and descended  4 steps with 2 rails SBA  12 steps with 1 rail Mod I   ROM BUE:  See OT note  BLE:  WFL     Strength BUE:  See OT note  BLE:  5/5 right, 4/5 left     Balance Sitting EOB:  SBA  Dynamic Standing:  SBA

## 2025-06-12 NOTE — PROGRESS NOTES
Occupational Therapy  OCCUPATIONAL THERAPY INITIAL EVALUATION    [facility]  [address], [place]      Date:2025                                                Patient Name: Regi Chacon  MRN: [id number]  : 1945  Room: [address] [place] [address]-A    Evaluating OT:Kasey Arteaga OTR/L #[id number]    Referring Provider: Melissa Still APRN - NP   Specific Provider Orders/Date: OT eval and treat 25     Diagnosis: TIA (transient ischemic attack) [G45.9]  Stroke-like symptoms [R29.90]  Altered mental status, unspecified altered mental status type [R41.82]  Cerebrovascular accident (CVA), unspecified mechanism (HCC) [I63.9]   Pt admitted to hospital on 25 for AMS, L sided weakness    Pertinent Medical History:  has a past medical history of Fluid retention in legs.       Precautions:  Fall Risk, [place] (speak in R ear), +alarms    Assessment of current deficits    [x] Functional mobility  [x]ADLs  [x] Strength               []Cognition    [x] Functional transfers   [x] IADLs         [x] Safety Awareness   [x]Endurance    [] Fine Coordination              [x] Balance      [] Vision/perception   []Sensation     []Gross Motor Coordination  [] ROM  [] Delirium                   [] Motor Control     OT PLAN OF CARE   OT POC based on physician orders, patient diagnosis and results of clinical assessment    Frequency/Duration 1-3 days/wk for 2 weeks PRN   Specific OT Treatment Interventions to include:   * Instruction/training on adapted ADL techniques and AE recommendations to increase functional independence within precautions       * Training on energy conservation strategies, correct breathing pattern and techniques to improve independence/tolerance for self-care routine  * Functional transfer/mobility training/DME recommendations for increased independence, safety, and fall prevention  * Patient/Family education to increase follow through with

## 2025-06-12 NOTE — PROCEDURES
PROCEDURE NOTE  Date: 6/12/2025   Name: Regi Chacon  YOB: 1945    Procedures    Attempted echo, patient was busy with other staff. Echo will be attempted again later.

## 2025-06-12 NOTE — PROGRESS NOTES
SPEECH/LANGUAGE PATHOLOGY  CLINICAL ASSESSMENT OF SWALLOWING FUNCTION   and PLAN OF CARE      PATIENT NAME:  Regi Chacon  (female)     MRN:  [id number]    :  1945  (80 y.o.)  STATUS:  Emergency visit:  Room 18A  TODAY'S DATE:  2025  ORDER DATE, DESCRIPTION AND REFERRING PROVIDER:    25 1430  Speech Language Pathology (SLP) eval and treat  Start:  25,   End:  25 143,   ONE TIME,   Standing Count:  1 Occurrences,   R         Tessy, Melissa Elias, APRN - NP     REASON FOR REFERRAL: CVA   EVALUATING THERAPIST: MIRNA Dela Cruz                 ASSESSMENT:    DYSPHAGIA DIAGNOSIS:   functional oropharyngeal swallow for age/premorbid functioning      DIET RECOMMENDATIONS:  Regular consistency solids (IDDSI level 7) with  thin liquids (IDDSI level 0)     FEEDING RECOMMENDATIONS:     Assistance level:  No assistance needed      Compensatory strategies recommended: No strategies are recommended at this time      Discussed recommendations with:  Patient     SPEECH THERAPY  PLAN OF CARE   The dysphagia POC is established based on physician order, dysphagia diagnosis and results of clinical assessment     Dysphagia therapy is not recommended     Conditions Requiring Skilled Therapeutic Intervention for dysphagia:    not applicable    Specific dysphagia interventions to include:     Not applicable    Specific instructions for next treatment:  not applicable   Patient Treatment Goals:    Short Term Goals:  Not applicable no therapy warranted     Long Term Goals:   Not applicable no therapy warranted      Patient/family Goal:    not applicable                    ADMITTING DIAGNOSIS: TIA (transient ischemic attack) [G45.9]  Stroke-like symptoms [R29.90]  Altered mental status, unspecified altered mental status type [R41.82]  Cerebrovascular accident (CVA), unspecified mechanism (HCC) [I63.9]    VISIT DIAGNOSIS:   Visit Diagnoses         Codes      TIA (transient ischemic attack)     G45.9       Altered mental status, unspecified altered mental status type     R41.82      Cerebrovascular accident (CVA), unspecified mechanism (HCC)     I63.9             PATIENT REPORT/COMPLAINT: denies difficulty swallowing  nursing not available at time of evaluation chart reviewed and patient is not NPO for any procedures per current documentation.     PRIOR LEVEL OF SWALLOW FUNCTION:    PAST HISTORY OF DYSPHAGIA?: none reported    Home diet: Regular consistency solids (IDDSI level 7) with  thin liquids (IDDSI level 0)    Current Diet Order:  ADULT DIET; Regular; Low Sodium (2 gm)    PROCEDURE:  Consistencies Administered During the Evaluation   Liquids: thin liquid   Solids:  pureed foods and soft solid foods      Method of Intake:   cup, straw, spoon  Self fed      Position:   Seated, upright    CLINICAL ASSESSMENT  Oral Stage:       The oral stage of swallowing was within functional limits      Pharyngeal Stage:    No signs of aspiration were noted during this evaluation however, silent aspiration cannot be ruled out at bedside.  If silent aspiration is suspected, a Videofluoroscopic Study of Swallowing (MBS) is recommended and requires a physician order.    Cognition:   Within functional limits for this exam    Oral Peripheral Examination   Adequate lingual/labial strength     Current Respiratory Status     10 liters O2 via nasal cannula     Parameters of Speech Production  Respiration:  Adequate for speech production  Quality:   Within functional limits  Intensity: Within functional limits    Volitional Swallow: Present    Volitional Cough:    Present    Pain: No pain reported.     EDUCATION:   The Speech Language Pathologist (SLP) completed education regarding results of evaluation and that intervention is not warranted at this time.  Learner: Patient  Education:  Reviewed results and recommendations of this evaluation  Evaluation of Education: Verbalizes understanding    This plan will be re-evaluated and revised in  1 week  if warranted.      CPT code:  53367  bedside swallow eval      [x]The admitting diagnosis and active problem list, have been reviewed prior to initiation of this evaluation.        ACTIVE PROBLEM LIST:   Patient Active Problem List   Diagnosis    Acute respiratory failure (HCC)    Pneumonia due to COVID-19 virus    Tobacco abuse    Vitamin D deficiency    Teller (hard of hearing)    Noncompliance    Nocturnal oxygen desaturation    Closed displaced intertrochanteric fracture of right femur with routine healing    History of femur fracture    Stroke-like symptoms    Pre-syncope    Edema of lower extremity    PVD (peripheral vascular disease) with claudication

## 2025-06-12 NOTE — PROGRESS NOTES
4 Eyes Skin Assessment     NAME:  Regi Chacon  YOB: 1945  MEDICAL RECORD NUMBER:  [id number]    The patient is being assessed for  Admission    I agree that at least one RN has performed a thorough Head to Toe Skin Assessment on the patient. ALL assessment sites listed below have been assessed.      Areas assessed by both nurses:    Head, Face, Ears, Shoulders, Back, Chest, Arms, Elbows, Hands, Sacrum. Buttock, Coccyx, Ischium, Legs. Feet and Heels, Under Medical Devices , and Other          Does the Patient have a Wound? No noted wound(s)       Gonzalo Prevention initiated by RN: Yes  Wound Care Orders initiated by RN: No    Pressure Injury (Stage 3,4, Unstageable, DTI, NWPT, and Complex wounds) if present, place Wound referral order by RN under : No    New Ostomies, if present place, Ostomy referral order under : No     Nurse 1 eSignature: Electronically signed by Shyanne Arias RN on 6/12/25 at 1:35 PM EDT    **SHARE this note so that the co-signing nurse can place an eSignature**    Nurse 2 eSignature: {Esignature:[id number]}

## 2025-06-13 ENCOUNTER — APPOINTMENT (OUTPATIENT)
Dept: INTERVENTIONAL RADIOLOGY/VASCULAR | Age: 80
DRG: 069 | End: 2025-06-13
Payer: MEDICARE

## 2025-06-13 ENCOUNTER — APPOINTMENT (OUTPATIENT)
Dept: MRI IMAGING | Age: 80
DRG: 069 | End: 2025-06-13
Payer: MEDICARE

## 2025-06-13 LAB
ANION GAP SERPL CALCULATED.3IONS-SCNC: 9 MMOL/L (ref 7–16)
BASOPHILS # BLD: 0.03 K/UL (ref 0–0.2)
BASOPHILS NFR BLD: 0 % (ref 0–2)
BUN SERPL-MCNC: 19 MG/DL (ref 8–23)
CALCIUM SERPL-MCNC: 8.3 MG/DL (ref 8.8–10.2)
CHLORIDE SERPL-SCNC: 109 MMOL/L (ref 98–107)
CO2 SERPL-SCNC: 21 MMOL/L (ref 22–29)
CREAT SERPL-MCNC: 0.6 MG/DL (ref 0.5–1)
ECHO BSA: 1.84 M2
EOSINOPHIL # BLD: 1.76 K/UL (ref 0.05–0.5)
EOSINOPHILS RELATIVE PERCENT: 22 % (ref 0–6)
ERYTHROCYTE [DISTWIDTH] IN BLOOD BY AUTOMATED COUNT: 15 % (ref 11.5–15)
GFR, ESTIMATED: >90 ML/MIN/1.73M2
GLUCOSE SERPL-MCNC: 107 MG/DL (ref 74–99)
HCT VFR BLD AUTO: 37.7 % (ref 34–48)
HGB BLD-MCNC: 11.8 G/DL (ref 11.5–15.5)
IMM GRANULOCYTES # BLD AUTO: <0.03 K/UL (ref 0–0.58)
IMM GRANULOCYTES NFR BLD: 0 % (ref 0–5)
LYMPHOCYTES NFR BLD: 0.98 K/UL (ref 1.5–4)
LYMPHOCYTES RELATIVE PERCENT: 13 % (ref 20–42)
MCH RBC QN AUTO: 28.9 PG (ref 26–35)
MCHC RBC AUTO-ENTMCNC: 31.3 G/DL (ref 32–34.5)
MCV RBC AUTO: 92.4 FL (ref 80–99.9)
MONOCYTES NFR BLD: 0.3 K/UL (ref 0.1–0.95)
MONOCYTES NFR BLD: 4 % (ref 2–12)
NEUTROPHILS NFR BLD: 61 % (ref 43–80)
NEUTS SEG NFR BLD: 4.77 K/UL (ref 1.8–7.3)
PLATELET # BLD AUTO: 156 K/UL (ref 130–450)
PMV BLD AUTO: 11.1 FL (ref 7–12)
POTASSIUM SERPL-SCNC: 3.8 MMOL/L (ref 3.5–5.1)
RBC # BLD AUTO: 4.08 M/UL (ref 3.5–5.5)
SODIUM SERPL-SCNC: 140 MMOL/L (ref 136–145)
WBC OTHER # BLD: 7.9 K/UL (ref 4.5–11.5)

## 2025-06-13 PROCEDURE — 36415 COLL VENOUS BLD VENIPUNCTURE: CPT

## 2025-06-13 PROCEDURE — 6370000000 HC RX 637 (ALT 250 FOR IP): Performed by: NURSE PRACTITIONER

## 2025-06-13 PROCEDURE — 85025 COMPLETE CBC W/AUTO DIFF WBC: CPT

## 2025-06-13 PROCEDURE — 6370000000 HC RX 637 (ALT 250 FOR IP): Performed by: INTERNAL MEDICINE

## 2025-06-13 PROCEDURE — 80048 BASIC METABOLIC PNL TOTAL CA: CPT

## 2025-06-13 PROCEDURE — 93923 UPR/LXTR ART STDY 3+ LVLS: CPT | Performed by: SURGERY

## 2025-06-13 PROCEDURE — G0378 HOSPITAL OBSERVATION PER HR: HCPCS

## 2025-06-13 PROCEDURE — 6370000000 HC RX 637 (ALT 250 FOR IP)

## 2025-06-13 PROCEDURE — 99232 SBSQ HOSP IP/OBS MODERATE 35: CPT | Performed by: INTERNAL MEDICINE

## 2025-06-13 PROCEDURE — 99232 SBSQ HOSP IP/OBS MODERATE 35: CPT | Performed by: SURGERY

## 2025-06-13 PROCEDURE — 2700000000 HC OXYGEN THERAPY PER DAY

## 2025-06-13 PROCEDURE — 93923 UPR/LXTR ART STDY 3+ LVLS: CPT

## 2025-06-13 PROCEDURE — 94640 AIRWAY INHALATION TREATMENT: CPT

## 2025-06-13 PROCEDURE — 2500000003 HC RX 250 WO HCPCS: Performed by: NURSE PRACTITIONER

## 2025-06-13 PROCEDURE — 6360000002 HC RX W HCPCS: Performed by: NURSE PRACTITIONER

## 2025-06-13 PROCEDURE — 70551 MRI BRAIN STEM W/O DYE: CPT

## 2025-06-13 PROCEDURE — 96372 THER/PROPH/DIAG INJ SC/IM: CPT

## 2025-06-13 PROCEDURE — 6360000002 HC RX W HCPCS: Performed by: INTERNAL MEDICINE

## 2025-06-13 RX ORDER — NICOTINE 21 MG/24HR
1 PATCH, TRANSDERMAL 24 HOURS TRANSDERMAL DAILY
Status: DISCONTINUED | OUTPATIENT
Start: 2025-06-13 | End: 2025-06-15 | Stop reason: HOSPADM

## 2025-06-13 RX ORDER — NICOTINE 21 MG/24HR
1 PATCH, TRANSDERMAL 24 HOURS TRANSDERMAL EVERY 24 HOURS
Qty: 30 PATCH | Refills: 3 | Status: SHIPPED | OUTPATIENT
Start: 2025-06-13 | End: 2025-10-11

## 2025-06-13 RX ADMIN — ATORVASTATIN CALCIUM 80 MG: 80 TABLET, FILM COATED ORAL at 22:19

## 2025-06-13 RX ADMIN — SODIUM CHLORIDE, PRESERVATIVE FREE 10 ML: 5 INJECTION INTRAVENOUS at 09:07

## 2025-06-13 RX ADMIN — Medication 2 CAPSULE: at 22:19

## 2025-06-13 RX ADMIN — SODIUM CHLORIDE, PRESERVATIVE FREE 10 ML: 5 INJECTION INTRAVENOUS at 22:19

## 2025-06-13 RX ADMIN — CLOPIDOGREL BISULFATE 75 MG: 75 TABLET, FILM COATED ORAL at 09:06

## 2025-06-13 RX ADMIN — ENOXAPARIN SODIUM 40 MG: 100 INJECTION SUBCUTANEOUS at 09:06

## 2025-06-13 RX ADMIN — FUROSEMIDE 40 MG: 40 TABLET ORAL at 11:39

## 2025-06-13 RX ADMIN — Medication 5 MG: at 17:27

## 2025-06-13 RX ADMIN — ARFORMOTEROL TARTRATE: 15 SOLUTION RESPIRATORY (INHALATION) at 18:59

## 2025-06-13 RX ADMIN — Medication 2 CAPSULE: at 09:06

## 2025-06-13 RX ADMIN — BISACODYL 10 MG: 10 SUPPOSITORY RECTAL at 09:08

## 2025-06-13 RX ADMIN — ARFORMOTEROL TARTRATE: 15 SOLUTION RESPIRATORY (INHALATION) at 08:41

## 2025-06-13 RX ADMIN — ASPIRIN 81 MG CHEWABLE TABLET 81 MG: 81 TABLET CHEWABLE at 09:06

## 2025-06-13 NOTE — ACP (ADVANCE CARE PLANNING)
Advance Care Planning   Healthcare Decision Maker:    Primary Decision Maker: LauramathewDavida wolff - Child - [phone]    Primary Decision Maker: Jada Lew - Child - [phone]    Click here to complete Healthcare Decision Makers including selection of the Healthcare Decision Maker Relationship (ie \"Primary\").          offered and declined. .HEMALATHA Clifton  6/13/2025

## 2025-06-13 NOTE — PLAN OF CARE
Problem: Safety - Adult  Goal: Free from fall injury  Outcome: Progressing     Problem: Chronic Conditions and Co-morbidities  Goal: Patient's chronic conditions and co-morbidity symptoms are monitored and maintained or improved  Outcome: Progressing      Home

## 2025-06-13 NOTE — CARE COORDINATION
Pt lives with her daughter, Jada, who is not working. Pt is retired and not a . No hhc pta. Her pcp is Dr. DIANA Kurtz whom pt saw about 6 months ago in the office. Pt resides in a mobile home with 4 steps from the side to enter. She no longer drives since her stroke but her daughter does. Pt said she is independent with bathing, meds  and dressing and  they share in the cooking. Pt uses [facility] as her pharmacy. Pt has a fww and cane that she uses, shower seat and is on 2l nc with portables. PT and OT saw pt with ampac of 18 and 19. I offered hhc and pt declined. Cardio signed off. Vascular is following and neurology to see. MRI of the brain is pending and a BLE vasc pressures. Pt is on 2l with that as her baseline. No speech needs. .HEMALATHA Clifton  6/13/2025    Case Management Assessment  Initial Evaluation    Date/Time of Evaluation: 6/13/2025 9:57 AM  Assessment Completed by: HEAMLATHA Clifton    If patient is discharged prior to next notation, then this note serves as note for discharge by case management.    Patient Name: Regi Chacon                   YOB: 1945  Diagnosis: TIA (transient ischemic attack) [G45.9]  Stroke-like symptoms [R29.90]  Altered mental status, unspecified altered mental status type [R41.82]  Cerebrovascular accident (CVA), unspecified mechanism (HCC) [I63.9]                   Date / Time: 6/11/2025 10:46 AM    Patient Admission Status: Observation   Readmission Risk (Low < 19, Mod (19-27), High > 27): No data recorded  Current PCP: Henri Luong MD  PCP verified by ? Yes    Chart Reviewed: Yes      History Provided by: Patient  Patient Orientation: Alert and Oriented    Patient Cognition: Alert    Hospitalization in the last 30 days (Readmission):  No    If yes, Readmission Assessment in  Navigator will be completed.    Advance Directives:      Code Status: Full Code   Patient's Primary Decision Maker is: Legal Next of Kin    Primary Decision Maker:

## 2025-06-13 NOTE — PROGRESS NOTES
[facility] Hospitalist Progress Note    Admitting Date and Time: 6/11/2025 10:46 AM  Admit Dx: TIA (transient ischemic attack) [G45.9]  Stroke-like symptoms [R29.90]  Altered mental status, unspecified altered mental status type [R41.82]  Cerebrovascular accident (CVA), unspecified mechanism (HCC) [I63.9]    Synopsis: Patient is a 80-year-old lady who has prior history of CVA presented to ED with chief complaint of altered mental status.  She was exhibiting strokelike symptoms on arrival with left-sided facial droop, left-sided weakness and slurred speech.  The symptoms resolved prior to arrival.  CT head in the ED is unremarkable.  He also reported dizziness, orthostatics were initially positive, she received IV fluids.    Subjective:  Patient is being followed for TIA (transient ischemic attack) [G45.9]  Stroke-like symptoms [R29.90]  Altered mental status, unspecified altered mental status type [R41.82]  Cerebrovascular accident (CVA), unspecified mechanism (HCC) [I63.9]     No overnight events reported.  She states she feels somewhat better.      ROS: denies fever, chills, cp, sob, n/v, HA unless stated above.      nicotine  1 patch TransDERmal Daily    clopidogrel  75 mg Oral Daily    enoxaparin  40 mg SubCUTAneous Daily    bisacodyl  10 mg Rectal Daily    lactobacillus  2 capsule Oral BID    melatonin  5 mg Oral QPM    sodium chloride flush  5-40 mL IntraVENous 2 times per day    atorvastatin  80 mg Oral Nightly    aspirin  81 mg Oral Daily    Or    aspirin  300 mg Rectal Daily    furosemide  40 mg Oral Daily    arformoterol 15 mcg-budesonide 0.25 mg neb solution   Nebulization BID RT     melatonin, 5 mg, Nightly PRN  magnesium - glycerin - water, , Once PRN  calcium carbonate, 500 mg, TID PRN  Polyvinyl Alcohol-Povidone PF, 1 drop, PRN  sodium chloride, 1 spray, PRN  magnesium hydroxide, 30 mL, Daily PRN  benzocaine-menthol, 1 lozenge, Q2H PRN  hydrOXYzine HCl, 25 mg, TID PRN  benzonatate, 100 mg,

## 2025-06-13 NOTE — CONSULTS
cells demonstrate no acute abnormality. SOFT TISSUES/SKULL:  No acute abnormality of the visualized skull or soft tissues.     No acute intracranial abnormality.     XR CHEST PORTABLE  Result Date: 6/11/2025  EXAMINATION: ONE XRAY VIEW OF THE CHEST 6/11/2025 11:13 am COMPARISON: 05/13/2025 HISTORY: ORDERING SYSTEM PROVIDED HISTORY: r/o pna TECHNOLOGIST PROVIDED HISTORY: Reason for exam:->r/o pna FINDINGS: The lungs are without acute focal process.  There is no effusion or pneumothorax. The cardiomediastinal silhouette is without acute process. The osseous structures are without acute process.     No acute process.       The patient's records from referring provider and available information in the EHR was reviewed.    I have personally reviewed the following studies: CT head and CTA head and neck from 2/11/25         It was my pleasure to evaluate Regi Chacon today.  Please call with questions.      Electronically signed by Chuckie Randolph MD on 6/13/2025 at 11:11 AM

## 2025-06-14 PROBLEM — R41.82 ALTERED MENTAL STATUS: Status: ACTIVE | Noted: 2025-06-14

## 2025-06-14 PROBLEM — G45.9 TIA (TRANSIENT ISCHEMIC ATTACK): Status: ACTIVE | Noted: 2025-06-14

## 2025-06-14 LAB
ANION GAP SERPL CALCULATED.3IONS-SCNC: 9 MMOL/L (ref 7–16)
BASOPHILS # BLD: 0.02 K/UL (ref 0–0.2)
BASOPHILS NFR BLD: 0 % (ref 0–2)
BUN SERPL-MCNC: 15 MG/DL (ref 8–23)
CALCIUM SERPL-MCNC: 8.7 MG/DL (ref 8.8–10.2)
CHLORIDE SERPL-SCNC: 107 MMOL/L (ref 98–107)
CO2 SERPL-SCNC: 26 MMOL/L (ref 22–29)
CREAT SERPL-MCNC: 0.6 MG/DL (ref 0.5–1)
EOSINOPHIL # BLD: 1.95 K/UL (ref 0.05–0.5)
EOSINOPHILS RELATIVE PERCENT: 24 % (ref 0–6)
ERYTHROCYTE [DISTWIDTH] IN BLOOD BY AUTOMATED COUNT: 14.7 % (ref 11.5–15)
GFR, ESTIMATED: >90 ML/MIN/1.73M2
GLUCOSE SERPL-MCNC: 102 MG/DL (ref 74–99)
HCT VFR BLD AUTO: 39.4 % (ref 34–48)
HGB BLD-MCNC: 12.4 G/DL (ref 11.5–15.5)
IMM GRANULOCYTES # BLD AUTO: <0.03 K/UL (ref 0–0.58)
IMM GRANULOCYTES NFR BLD: 0 % (ref 0–5)
LYMPHOCYTES NFR BLD: 1.41 K/UL (ref 1.5–4)
LYMPHOCYTES RELATIVE PERCENT: 17 % (ref 20–42)
MCH RBC QN AUTO: 29.3 PG (ref 26–35)
MCHC RBC AUTO-ENTMCNC: 31.5 G/DL (ref 32–34.5)
MCV RBC AUTO: 93.1 FL (ref 80–99.9)
MONOCYTES NFR BLD: 0.5 K/UL (ref 0.1–0.95)
MONOCYTES NFR BLD: 6 % (ref 2–12)
NEUTROPHILS NFR BLD: 53 % (ref 43–80)
NEUTS SEG NFR BLD: 4.39 K/UL (ref 1.8–7.3)
PLATELET # BLD AUTO: 162 K/UL (ref 130–450)
PMV BLD AUTO: 10.7 FL (ref 7–12)
POTASSIUM SERPL-SCNC: 5 MMOL/L (ref 3.5–5.1)
RBC # BLD AUTO: 4.23 M/UL (ref 3.5–5.5)
SODIUM SERPL-SCNC: 141 MMOL/L (ref 136–145)
WBC OTHER # BLD: 8.3 K/UL (ref 4.5–11.5)

## 2025-06-14 PROCEDURE — 80048 BASIC METABOLIC PNL TOTAL CA: CPT

## 2025-06-14 PROCEDURE — G0378 HOSPITAL OBSERVATION PER HR: HCPCS

## 2025-06-14 PROCEDURE — 1200000000 HC SEMI PRIVATE

## 2025-06-14 PROCEDURE — 2700000000 HC OXYGEN THERAPY PER DAY

## 2025-06-14 PROCEDURE — 6360000002 HC RX W HCPCS: Performed by: INTERNAL MEDICINE

## 2025-06-14 PROCEDURE — 6370000000 HC RX 637 (ALT 250 FOR IP): Performed by: NURSE PRACTITIONER

## 2025-06-14 PROCEDURE — 6370000000 HC RX 637 (ALT 250 FOR IP): Performed by: INTERNAL MEDICINE

## 2025-06-14 PROCEDURE — 94640 AIRWAY INHALATION TREATMENT: CPT

## 2025-06-14 PROCEDURE — 2500000003 HC RX 250 WO HCPCS: Performed by: NURSE PRACTITIONER

## 2025-06-14 PROCEDURE — 6360000002 HC RX W HCPCS: Performed by: NURSE PRACTITIONER

## 2025-06-14 PROCEDURE — 96372 THER/PROPH/DIAG INJ SC/IM: CPT

## 2025-06-14 PROCEDURE — 36415 COLL VENOUS BLD VENIPUNCTURE: CPT

## 2025-06-14 PROCEDURE — 99232 SBSQ HOSP IP/OBS MODERATE 35: CPT | Performed by: INTERNAL MEDICINE

## 2025-06-14 PROCEDURE — 99232 SBSQ HOSP IP/OBS MODERATE 35: CPT

## 2025-06-14 PROCEDURE — 85025 COMPLETE CBC W/AUTO DIFF WBC: CPT

## 2025-06-14 PROCEDURE — 6370000000 HC RX 637 (ALT 250 FOR IP)

## 2025-06-14 RX ORDER — NICOTINE 21 MG/24HR
1 PATCH, TRANSDERMAL 24 HOURS TRANSDERMAL DAILY
Qty: 30 PATCH | Refills: 3 | Status: SHIPPED | OUTPATIENT
Start: 2025-06-15

## 2025-06-14 RX ORDER — FUROSEMIDE 40 MG/1
40 TABLET ORAL DAILY
Qty: 60 TABLET | Refills: 3 | Status: SHIPPED | OUTPATIENT
Start: 2025-06-15 | End: 2025-06-14 | Stop reason: HOSPADM

## 2025-06-14 RX ORDER — ATORVASTATIN CALCIUM 80 MG/1
80 TABLET, FILM COATED ORAL NIGHTLY
Qty: 30 TABLET | Refills: 3 | Status: SHIPPED | OUTPATIENT
Start: 2025-06-14

## 2025-06-14 RX ADMIN — Medication 5 MG: at 20:13

## 2025-06-14 RX ADMIN — Medication 2 CAPSULE: at 20:13

## 2025-06-14 RX ADMIN — SODIUM CHLORIDE, PRESERVATIVE FREE 10 ML: 5 INJECTION INTRAVENOUS at 20:14

## 2025-06-14 RX ADMIN — ASPIRIN 81 MG CHEWABLE TABLET 81 MG: 81 TABLET CHEWABLE at 08:29

## 2025-06-14 RX ADMIN — FUROSEMIDE 40 MG: 40 TABLET ORAL at 08:29

## 2025-06-14 RX ADMIN — CLOPIDOGREL BISULFATE 75 MG: 75 TABLET, FILM COATED ORAL at 08:29

## 2025-06-14 RX ADMIN — ENOXAPARIN SODIUM 40 MG: 100 INJECTION SUBCUTANEOUS at 08:28

## 2025-06-14 RX ADMIN — SODIUM CHLORIDE, PRESERVATIVE FREE 10 ML: 5 INJECTION INTRAVENOUS at 08:29

## 2025-06-14 RX ADMIN — ATORVASTATIN CALCIUM 80 MG: 80 TABLET, FILM COATED ORAL at 20:14

## 2025-06-14 RX ADMIN — ARFORMOTEROL TARTRATE: 15 SOLUTION RESPIRATORY (INHALATION) at 21:26

## 2025-06-14 RX ADMIN — Medication 2 CAPSULE: at 08:29

## 2025-06-14 RX ADMIN — ARFORMOTEROL TARTRATE: 15 SOLUTION RESPIRATORY (INHALATION) at 08:22

## 2025-06-14 ASSESSMENT — PAIN SCALES - GENERAL: PAINLEVEL_OUTOF10: 0

## 2025-06-14 NOTE — DISCHARGE SUMMARY
[facility] Hospitalist Physician Discharge Summary       Gabe Rogers MD  [address] [facility] [id number] [phone]    Schedule an appointment as soon as possible for a visit in 3 month(s)  discuss R leg arterial intervention.  need to stop smoking    Henri Luong MD  [address]  [facility] [address] [phone]    Follow up      Venancio Calle MD  [address] [facility] [id number] [phone]    Follow up      Henri Luong MD  [address]  [facility] [address] [phone]    Follow up in 1 week(s)        Activity level: As tolerated     Dispo: Home      Condition on discharge: Stable     Patient ID:  Regi Chacon  [id number]  80 y.o.  1945    Admit date: 6/11/2025    Discharge date and time:  6/14/2025  11:12 AM    Admission Diagnoses: Principal Problem:    Stroke-like symptoms  Active Problems:    Pre-syncope    Edema of lower extremity    PVD (peripheral vascular disease) with claudication  Resolved Problems:    * No resolved hospital problems. *      Discharge Diagnoses: Principal Problem:    Stroke-like symptoms  Active Problems:    Pre-syncope    Edema of lower extremity    PVD (peripheral vascular disease) with claudication  Resolved Problems:    * No resolved hospital problems. *      Consults:  IP CONSULT TO HOSPITALIST  IP CONSULT TO NEUROLOGY  IP CONSULT TO CASE MANAGEMENT  IP CONSULT TO VASCULAR SURGERY  IP CONSULT TO CARDIOLOGY    Hospital Course:     Patient is a 80-year-old lady who has prior history of CVA presented to ED with chief complaint of altered mental status.  She was exhibiting strokelike symptoms on arrival with left-sided facial droop, left-sided weakness and slurred speech.  The symptoms resolved prior to arrival.  CT head in the ED is unremarkable.  He also reported dizziness, orthostatics were initially positive, she received IV fluids.  CTA neck-no significant stenosis of carotid or vertebral arteries and neck,    CREATININE 0.6 0.6 0.6   GLUCOSE 118* 107* 102*   CALCIUM 8.4* 8.3* 8.7*       Recent Labs     06/12/25  0700 06/13/25  0502 06/14/25  0516   WBC 11.7* 7.9 8.3   RBC 4.68 4.08 4.23   HGB 13.4 11.8 12.4   HCT 42.7 37.7 39.4   MCV 91.2 92.4 93.1   MCH 28.6 28.9 29.3   MCHC 31.4* 31.3* 31.5*   RDW 15.1* 15.0 14.7    156 162   MPV 10.9 11.1 10.7       No results for input(s): \"POCGLU\" in the last 72 hours.    Imaging:  Echo (TTE) complete (PRN contrast/bubble/strain/3D)  Result Date: 6/12/2025    Left Ventricle: Normal left ventricular systolic function with a visually estimated EF of 60 - 65%. Left ventricle size is normal. Normal wall thickness. Normal wall motion.   Right Ventricle: Right ventricle size is normal. Normal systolic function.   Aortic Valve: Mildly calcified cusps.   Tricuspid Valve: RVSP is 33 mmHg.   Left Atrium: Left atrium is mildly dilated.   Image quality is adequate.     CTA CHEST W CONTRAST  Result Date: 6/11/2025  EXAMINATION: CTA OF THE CHEST; CTA OF THE ABDOMEN AND PELVIS WITH CONTRAST 6/11/2025 5:27 pm: TECHNIQUE: CTA of the chest was performed after the administration of intravenous contrast.  Multiplanar reformatted images are provided for review.  MIP images are provided for review. Automated exposure control, iterative reconstruction, and/or weight based adjustment of the mA/kV was utilized to reduce the radiation dose to as low as reasonably achievable.; CTA of the abdomen and pelvis was performed with the administration of intravenous contrast. Multiplanar reformatted images are provided for review.  MIP images are provided for review. Automated exposure control, iterative reconstruction, and/or weight based adjustment of the mA/kV was utilized to reduce the radiation dose to as low as reasonably achievable. COMPARISON: CT abdomen/pelvis 05/15/2025 and CT chest 11/02/2022 HISTORY: ORDERING SYSTEM PROVIDED HISTORY: aortic thrombosis TECHNOLOGIST PROVIDED HISTORY: Reason for  exam:->aortic thrombosis Additional Contrast?->1 What reading provider will be dictating this exam?->CRC FINDINGS: CTA CHEST: Thoracic aorta: Thoracic aorta is normal in caliber with homogeneous enhancement.  No evidence of thoracic aortic aneurysm, intramural hematoma or dissection.  Calcified plaque in the aortic arch.  Proximal great vessels are patent.  Descending thoracic aorta is tortuous.  No acute abnormality of the aorta. Mediastinum: No evidence of mediastinal lymphadenopathy.  The heart size is normal.  The heart and pericardium demonstrate no acute abnormality. Lungs/Pleura: The lungs are without acute process.  No focal consolidation or pulmonary edema.  No evidence of pleural effusion or pneumothorax. Soft Tissues/Bones: No acute bone or soft tissue abnormality. CTA ABDOMEN and PELVIS: Abdominal aorta/Branches: Bulky calcific aorto iliac atherosclerotic disease. The abdominal aorta is tortuous with aneurysmal dilatation of the infrarenal abdominal aorta measuring up to 4.1 cm not significantly changed from the prior study.  There is slightly increased intraluminal low-attenuation thrombus.  No evidence of aortic dissection.  Abdominal aortic branch vessels are patent.  There is mild aneurysmal dilatation of the right greater than left common iliac arteries.  Calcified plaque in the bilateral iliac and femoral arteries with no evidence of dissection. Organs: There is a small volume of right perihepatic fluid new from 05/13/2025.  The liver, spleen, pancreas and kidneys are grossly normal.  The pancreas is atrophic.  There are renal artery calcifications.  There are no calcified gallstones. GI/Bowel: Moderate formed stool load in the rectosigmoid colon.  Small bowel loops are normal in caliber.  No bowel obstruction. Peritoneum/Retroperitoneum: Bones/Soft Tissues: No adenopathy or free air.  Small volume of free fluid in the right perihepatic space.  Multilevel degenerative changes with no acute bone

## 2025-06-14 NOTE — PROGRESS NOTES
CLINICAL PHARMACY NOTE: MEDS TO BEDS    Total # of Prescriptions Filled: 2   The following medications were delivered to the patient:  Atorvastatin 80  Nicotine 14mg/24 patch     Additional Documentation:  Delivered to pt

## 2025-06-14 NOTE — PROGRESS NOTES
Both Jolanta (Grandchild) and Jada (daughter) listed in chart, have been called multiple times and voicemail left that patient is discharged and waiting to be picked up.

## 2025-06-14 NOTE — PROGRESS NOTES
Pts daughter Paloma who does not live near here called to check on pt. She will try to get in touch with Momo about discharge.

## 2025-06-14 NOTE — PROGRESS NOTES
Regi Chacon is a 80 y.o. right handed female     Neurology following for TIA    PMH of smoking    Assessment:     TIA  Patient with left facial droop, left-sided weakness, and slurred speech  CTA and MRI negative for acute infarct, severe stenosis, or LVO  Stroke risk factors include smoking and age    Plan:     Continue DAPT x 21 days then aspirin monotherapy  Continue statin with an LDL goal <70  Smoking cessation  Stroke education  Neurology will sign off call if needed  Follow-up with neurology after discharge    History of Present Illness:     Patient presented to the ER in 6/13/2025 after experiencing altered mental status, left facial droop, left-sided weakness and slurred speech.  EMS was called and her symptoms resolved on the way to the ER.    Her NIH was 0 and she was not a candidate for TNK    Her CTA revealed thrombus in the aortic arch and vascular surgery was consulted.    Neurology was consulted for TIA    Subjective:     Patient sitting up in bed awake and alert.  She is able to follow all commands without difficulty.    She has been smoking since she was 14 years old and is now down to 8 cigarettes/day.  She said she has had nicotine patch on since admission and is doing well.  Encourage patient to quit smoking altogether    There is no family at the bedside    Objective:       BP (!) 159/65   Pulse 56   Temp 97.6 °F (36.4 °C) (Temporal)   Resp 16   Ht 1.702 m (5' 7\")   Wt 71.9 kg (158 lb 9 oz)   SpO2 96%   BMI 24.83 kg/m²       General appearance: alert, appears stated age, cooperative and no distress  Head: normocephalic, without obvious abnormality, atraumatic  Eyes: conjunctivae/corneas clear  Neck: no adenopathy,  supple, symmetrical, trachea midline and thyroid not enlarged, symmetric, no tenderness/mass/nodules  Lungs: Regular respirations on room air  Heart: No chest pain or palpitations  Abdomen: soft, non-tender; bowel sounds normal; no masses,  no organomegaly  Extremities:

## 2025-06-14 NOTE — PROGRESS NOTES
4 Eyes Skin Assessment     NAME:  Regi Chacon  YOB: 1945  MEDICAL RECORD NUMBER:  [id number]    The patient is being assessed for  Transfer to new unit    I agree that at least one RN has performed a thorough Head to Toe Skin Assessment on the patient. ALL assessment sites listed below have been assessed.      Areas assessed by both nurses:    Head, Face, Ears, Shoulders, Back, Chest, Arms, Elbows, Hands, Sacrum. Buttock, Coccyx, Ischium, and Legs. Feet and Heels        Does the Patient have a Wound? No noted wound(s)       Gonzalo Prevention initiated by RN: Yes  Wound Care Orders initiated by RN: Yes    Pressure Injury (Stage 3,4, Unstageable, DTI, NWPT, and Complex wounds) if present, place Wound referral order by RN under : No    New Ostomies, if present place, Ostomy referral order under : No     Nurse 1 eSignature: Electronically signed by Roya Vasquez RN on 6/14/25 at 6:09 PM EDT    **SHARE this note so that the co-signing nurse can place an eSignature**    Nurse 2 eSignature: {Esignature:[id number]}4 Eyes Skin Assessment     NAME:  Regi Chacon  YOB: 1945  MEDICAL RECORD NUMBER:  [id number]    The patient is being assessed for  {Reason for Assessment:79727}    I agree that at least one RN has performed a thorough Head to Toe Skin Assessment on the patient. ALL assessment sites listed below have been assessed.      Areas assessed by both nurses:    {Pressure Areas Assessed:78258}        Does the Patient have a Wound? {Action Wound:53217}       Gonzalo Prevention initiated by RN: {YES/NO:19726}  Wound Care Orders initiated by RN: {YES/NO:19726}    Pressure Injury (Stage 3,4, Unstageable, DTI, NWPT, and Complex wounds) if present, place Wound referral order by RN under : {YES/NO:19726}    New Ostomies, if present place, Ostomy referral order under : {YES/NO:19726}     Nurse 1 eSignature: {Esignature:[id number]}    **SHARE this note so

## 2025-06-14 NOTE — PLAN OF CARE
Problem: Safety - Adult  Goal: Free from fall injury  6/14/2025 0420 by Trudy Zamudio RN  Outcome: Progressing  6/13/2025 1720 by Shyanne Arias RN  Outcome: Progressing     Problem: Discharge Planning  Goal: Discharge to home or other facility with appropriate resources  Outcome: Progressing     Problem: Chronic Conditions and Co-morbidities  Goal: Patient's chronic conditions and co-morbidity symptoms are monitored and maintained or improved  6/14/2025 0420 by Trudy Zamudio RN  Outcome: Progressing  6/13/2025 1720 by Shyanne Arias RN  Outcome: Progressing

## 2025-06-14 NOTE — PROGRESS NOTES
Attempted to call patients daughter, Jada to notify pt is discharged and needs clothes to return home with no answer. Will attempt at a later time

## 2025-06-15 VITALS
HEART RATE: 66 BPM | SYSTOLIC BLOOD PRESSURE: 125 MMHG | RESPIRATION RATE: 18 BRPM | TEMPERATURE: 98.2 F | BODY MASS INDEX: 24.89 KG/M2 | OXYGEN SATURATION: 96 % | HEIGHT: 67 IN | DIASTOLIC BLOOD PRESSURE: 75 MMHG | WEIGHT: 158.56 LBS

## 2025-06-15 LAB
ANION GAP SERPL CALCULATED.3IONS-SCNC: 8 MMOL/L (ref 7–16)
BASOPHILS # BLD: 0.04 K/UL (ref 0–0.2)
BASOPHILS NFR BLD: 1 % (ref 0–2)
BUN SERPL-MCNC: 17 MG/DL (ref 8–23)
CALCIUM SERPL-MCNC: 8.4 MG/DL (ref 8.8–10.2)
CHLORIDE SERPL-SCNC: 105 MMOL/L (ref 98–107)
CO2 SERPL-SCNC: 26 MMOL/L (ref 22–29)
CREAT SERPL-MCNC: 0.7 MG/DL (ref 0.5–1)
EOSINOPHIL # BLD: 1.94 K/UL (ref 0.05–0.5)
EOSINOPHILS RELATIVE PERCENT: 24 % (ref 0–6)
ERYTHROCYTE [DISTWIDTH] IN BLOOD BY AUTOMATED COUNT: 14.7 % (ref 11.5–15)
GFR, ESTIMATED: 90 ML/MIN/1.73M2
GLUCOSE SERPL-MCNC: 102 MG/DL (ref 74–99)
HCT VFR BLD AUTO: 38.3 % (ref 34–48)
HGB BLD-MCNC: 11.9 G/DL (ref 11.5–15.5)
IMM GRANULOCYTES # BLD AUTO: <0.03 K/UL (ref 0–0.58)
IMM GRANULOCYTES NFR BLD: 0 % (ref 0–5)
LYMPHOCYTES NFR BLD: 1.76 K/UL (ref 1.5–4)
LYMPHOCYTES RELATIVE PERCENT: 22 % (ref 20–42)
MCH RBC QN AUTO: 28.5 PG (ref 26–35)
MCHC RBC AUTO-ENTMCNC: 31.1 G/DL (ref 32–34.5)
MCV RBC AUTO: 91.8 FL (ref 80–99.9)
MONOCYTES NFR BLD: 0.48 K/UL (ref 0.1–0.95)
MONOCYTES NFR BLD: 6 % (ref 2–12)
NEUTROPHILS NFR BLD: 48 % (ref 43–80)
NEUTS SEG NFR BLD: 3.88 K/UL (ref 1.8–7.3)
PLATELET # BLD AUTO: 174 K/UL (ref 130–450)
PMV BLD AUTO: 10.8 FL (ref 7–12)
POTASSIUM SERPL-SCNC: 4.1 MMOL/L (ref 3.5–5.1)
RBC # BLD AUTO: 4.17 M/UL (ref 3.5–5.5)
SODIUM SERPL-SCNC: 140 MMOL/L (ref 136–145)
WBC OTHER # BLD: 8.1 K/UL (ref 4.5–11.5)

## 2025-06-15 PROCEDURE — G0378 HOSPITAL OBSERVATION PER HR: HCPCS

## 2025-06-15 PROCEDURE — 80048 BASIC METABOLIC PNL TOTAL CA: CPT

## 2025-06-15 PROCEDURE — 6360000002 HC RX W HCPCS: Performed by: NURSE PRACTITIONER

## 2025-06-15 PROCEDURE — 36415 COLL VENOUS BLD VENIPUNCTURE: CPT

## 2025-06-15 PROCEDURE — 2500000003 HC RX 250 WO HCPCS: Performed by: NURSE PRACTITIONER

## 2025-06-15 PROCEDURE — 94640 AIRWAY INHALATION TREATMENT: CPT

## 2025-06-15 PROCEDURE — 6370000000 HC RX 637 (ALT 250 FOR IP): Performed by: NURSE PRACTITIONER

## 2025-06-15 PROCEDURE — 6360000002 HC RX W HCPCS: Performed by: INTERNAL MEDICINE

## 2025-06-15 PROCEDURE — 6370000000 HC RX 637 (ALT 250 FOR IP): Performed by: INTERNAL MEDICINE

## 2025-06-15 PROCEDURE — 96372 THER/PROPH/DIAG INJ SC/IM: CPT

## 2025-06-15 PROCEDURE — 6370000000 HC RX 637 (ALT 250 FOR IP)

## 2025-06-15 PROCEDURE — 85025 COMPLETE CBC W/AUTO DIFF WBC: CPT

## 2025-06-15 PROCEDURE — 99239 HOSP IP/OBS DSCHRG MGMT >30: CPT | Performed by: INTERNAL MEDICINE

## 2025-06-15 RX ADMIN — SODIUM CHLORIDE, PRESERVATIVE FREE 10 ML: 5 INJECTION INTRAVENOUS at 08:27

## 2025-06-15 RX ADMIN — ENOXAPARIN SODIUM 40 MG: 100 INJECTION SUBCUTANEOUS at 08:24

## 2025-06-15 RX ADMIN — ARFORMOTEROL TARTRATE: 15 SOLUTION RESPIRATORY (INHALATION) at 08:39

## 2025-06-15 RX ADMIN — ASPIRIN 81 MG CHEWABLE TABLET 81 MG: 81 TABLET CHEWABLE at 08:24

## 2025-06-15 RX ADMIN — CLOPIDOGREL BISULFATE 75 MG: 75 TABLET, FILM COATED ORAL at 08:24

## 2025-06-15 RX ADMIN — Medication 2 CAPSULE: at 08:24

## 2025-06-15 NOTE — CARE COORDINATION
6/15. Placed call to Jolanta. She will be here at the hospital around 5 to  the pt nursing notified. Cecelia Pacheco RN

## 2025-06-15 NOTE — DISCHARGE SUMMARY
[facility] Hospitalist Physician Discharge Summary       Gabe Rogers MD  [address] [facility] [id number] [phone]    Schedule an appointment as soon as possible for a visit in 3 month(s)  discuss R leg arterial intervention.  need to stop smoking    Henri Luong MD  [address]  [facility] [address] [phone]    Follow up      Venancio Calle MD  [address] [facility] [id number] [phone]    Follow up      Henri Luong MD  [address]  [facility] [address] [phone]    Follow up in 1 week(s)        Activity level: As tolerated     Dispo: Home      Condition on discharge: Stable     Patient ID:  Regi Chacon  [id number]  80 y.o.  1945    Admit date: 6/11/2025    Discharge date and time:  6/15/2025  11:21 AM    Admission Diagnoses: Principal Problem:    Stroke-like symptoms  Active Problems:    Pre-syncope    Edema of lower extremity    PVD (peripheral vascular disease) with claudication    Altered mental status    TIA (transient ischemic attack)  Resolved Problems:    * No resolved hospital problems. *      Discharge Diagnoses: Principal Problem:    Stroke-like symptoms  Active Problems:    Pre-syncope    Edema of lower extremity    PVD (peripheral vascular disease) with claudication    Altered mental status    TIA (transient ischemic attack)  Resolved Problems:    * No resolved hospital problems. *      Consults:  IP CONSULT TO HOSPITALIST  IP CONSULT TO CASE MANAGEMENT  IP CONSULT TO VASCULAR SURGERY  IP CONSULT TO CARDIOLOGY    Hospital Course:     Patient is a 80-year-old lady who has prior history of CVA presented to ED with chief complaint of altered mental status.  She was exhibiting strokelike symptoms on arrival with left-sided facial droop, left-sided weakness and slurred speech.  The symptoms resolved prior to arrival.  CT head in the ED is unremarkable.  He also reported dizziness, orthostatics were initially positive, she  dissection.  Calcified plaque in the aortic arch.  Proximal great vessels are patent.  Descending thoracic aorta is tortuous.  No acute abnormality of the aorta. Mediastinum: No evidence of mediastinal lymphadenopathy.  The heart size is normal.  The heart and pericardium demonstrate no acute abnormality. Lungs/Pleura: The lungs are without acute process.  No focal consolidation or pulmonary edema.  No evidence of pleural effusion or pneumothorax. Soft Tissues/Bones: No acute bone or soft tissue abnormality. CTA ABDOMEN and PELVIS: Abdominal aorta/Branches: Bulky calcific aorto iliac atherosclerotic disease. The abdominal aorta is tortuous with aneurysmal dilatation of the infrarenal abdominal aorta measuring up to 4.1 cm not significantly changed from the prior study.  There is slightly increased intraluminal low-attenuation thrombus.  No evidence of aortic dissection.  Abdominal aortic branch vessels are patent.  There is mild aneurysmal dilatation of the right greater than left common iliac arteries.  Calcified plaque in the bilateral iliac and femoral arteries with no evidence of dissection. Organs: There is a small volume of right perihepatic fluid new from 05/13/2025.  The liver, spleen, pancreas and kidneys are grossly normal.  The pancreas is atrophic.  There are renal artery calcifications.  There are no calcified gallstones. GI/Bowel: Moderate formed stool load in the rectosigmoid colon.  Small bowel loops are normal in caliber.  No bowel obstruction. Peritoneum/Retroperitoneum: Bones/Soft Tissues: No adenopathy or free air.  Small volume of free fluid in the right perihepatic space.  Multilevel degenerative changes with no acute bone finding.     1. No evidence of thoracic aortic aneurysm, intramural hematoma or dissection. 2. Infrarenal abdominal aortic aneurysm measuring up to 4.1 cm not significantly changed from the prior study. There is slightly increased intraluminal low-attenuation thrombus. No

## 2025-06-15 NOTE — PROGRESS NOTES
Message left for Jada Stover, and Davida at each of their numbers regarding pt discharge and need for transportation home. Number left to return call.

## 2025-06-15 NOTE — CARE COORDINATION
6/15. Placed call to Yunior,. Left messages to inform that the pt is discharged and will need family to provide transportation home. Cecelia Pacheco RN

## 2025-06-15 NOTE — PROGRESS NOTES
Pts granddaughter Jolanta here to get pt. Pt given discharge instructions, all questions answered. Pt thankful for care.

## 2025-06-18 ENCOUNTER — FOLLOWUP TELEPHONE ENCOUNTER (OUTPATIENT)
Dept: AUDIOLOGY | Age: 80
End: 2025-06-18

## 2025-06-18 NOTE — TELEPHONE ENCOUNTER
Called and lvm for family to call back for scheduling. Spoke the the patient's daughter mouna who lives out of town. She will try to have her family call in for scheduling as well.     Electronically signed by Yanelis Fofana on 6/18/2025 at 3:36 PM

## 2025-06-25 ENCOUNTER — FOLLOWUP TELEPHONE ENCOUNTER (OUTPATIENT)
Dept: AUDIOLOGY | Age: 80
End: 2025-06-25

## 2025-06-25 NOTE — TELEPHONE ENCOUNTER
Called and lvm to schedule a hearing aid fitting. Hearing aids are in.  LVM at 2 different numbers.    Electronically signed by Yanelis Fofana on 6/25/2025 at 11:00 AM

## 2025-06-27 ENCOUNTER — FOLLOWUP TELEPHONE ENCOUNTER (OUTPATIENT)
Dept: AUDIOLOGY | Age: 80
End: 2025-06-27

## 2025-06-27 NOTE — TELEPHONE ENCOUNTER
Called to schedule patient for HAF.  No answer left voicemail. Will have to return hearing aids soon if no call back for scheduling.   Electronically signed by Yanelis Fofana on 6/27/2025 at 4:23 PM

## 2025-06-28 NOTE — PROGRESS NOTES
Physician Progress Note      PATIENT:               DANIEL SHAFER  CSN #:                  [id number]  :                       1945  ADMIT DATE:       2025 10:46 AM  DISCH DATE:        6/15/2025 5:10 PM  RESPONDING  PROVIDER #:        Gayla Ayala MD          QUERY TEXT:    TIA is documented in the medical record on progress notes dated 25 by   CHRISTINA Ceja. Please further specify the etiology of the TIA    The clinical indicators include:  - \"TIA Patient with left facial droop, left-sided weakness, and slurred   speech, CTA and MRI negative for acute infarct, severe stenosis, or LVO,   Stroke risk factors include smoking and age\" per progress notes dated 25   by CHRISTINA Ceja.  - \"Her CTA revealed thrombus in the aortic arch and vascular surgery was   consulted.\" per progress notes dated 25 by CHRISTINA Ceja  -\" She was exhibiting strokelike symptoms on arrival with left-sided facial   droop, left-sided weakness and slurred speech.  The symptoms resolved prior to arrival. CT head in the ED is unremarkable.\"   per progress notes dated 25 by Dr. Ayala  -\"Pt is being seen in f/u today regarding AAA, descending thoracic aortic   atherosclerosis\" progress notes dated 25 by Dr. Rogers.  -\"  Aortic thrombus not likely the contributing factor to her stroke like   symptoms\" progress notes dated 25 by Dr. Rogers.  Options provided:  -- TIA symptoms related to CVA, TIA ruled out after study  -- TIA symptoms related to thrombus in the aortic arch  -- TIA symptoms related to previous CVA.  -- Other - I will add my own diagnosis  -- Disagree - Not applicable / Not valid  -- Disagree - Clinically unable to determine / Unknown  -- Refer to Clinical Documentation Reviewer    PROVIDER RESPONSE TEXT:    TIA symptoms are related to CVA, TIA ruled out after study.    Query created by: Tory Rocha on 2025 3:26 PM      Electronically signed by:  Gayla Ayala MD

## 2025-07-02 ENCOUNTER — FOLLOWUP TELEPHONE ENCOUNTER (OUTPATIENT)
Dept: AUDIOLOGY | Age: 80
End: 2025-07-02

## 2025-07-02 NOTE — TELEPHONE ENCOUNTER
Called and lvm for patient to call for scheduling. Called Davida and spoke to her. Told her that we have to get her scheduled for a hearing aid fitting or I will have to send the hearing aids back at this point. She will try to call her family regarding an appointment and if they can't bring her she will take off work to bring her in.    Electronically signed by Yanelis Fofana on 7/2/2025 at 9:19 AM

## 2025-07-02 NOTE — TELEPHONE ENCOUNTER
Davida called back and she contacted her family. They are to get a hold of me for scheduling.  Hopefully they can get in for an appointment on Wednesday. Will await a call from the patient's family.    Electronically signed by Yanelis Fofana on 7/2/2025 at 4:09 PM

## 2025-07-08 ENCOUNTER — TELEPHONE (OUTPATIENT)
Dept: AUDIOLOGY | Age: 80
End: 2025-07-08

## 2025-07-08 NOTE — TELEPHONE ENCOUNTER
The patient's family called and lvm that they will take the appointment tomorrow for a 9:00 arrival time. Patient scheduled. Called by request to confirm, however there was no answer and voicemail was not set up. Will try to call again later to confirm.     Jada campbell phone: [phone]    Electronically signed by Yanelis Fofana on 7/8/2025 at 8:13 AM     no

## 2025-07-09 ENCOUNTER — HOSPITAL ENCOUNTER (OUTPATIENT)
Dept: AUDIOLOGY | Age: 80
Discharge: HOME | End: 2025-07-09
Payer: MEDICARE

## 2025-07-09 PROCEDURE — V5160 DISPENSING FEE BINAURAL: HCPCS | Performed by: AUDIOLOGIST

## 2025-07-09 PROCEDURE — V5260 HEARING AID, DIGIT, BIN, ITE: HCPCS | Performed by: AUDIOLOGIST

## 2025-07-09 NOTE — PROGRESS NOTES
Fit with binaural  ITE  hearing aids. Instructed in use and care. Gave  battery charger and warranty information.  30 day follow-up declined. Patient will call as needed for appointments. Made following adjustments: Overall gain decreased. Hearing aid contract/battery warning form reviewed and signed.  Patient was able to take the hearing aids in and out independently. Instructed on wax guard and volume control usage.     Patient was satisfied and will follow up as needed.     Billing to be done today as patient declined 30 day trial appointment.     Electronically signed by Yanelis Fofana on 7/9/2025 at 11:08 AM

## 2025-08-24 ENCOUNTER — APPOINTMENT (OUTPATIENT)
Dept: CT IMAGING | Age: 80
End: 2025-08-24
Payer: MEDICARE

## 2025-08-24 ENCOUNTER — HOSPITAL ENCOUNTER (EMERGENCY)
Age: 80
Discharge: HOME | End: 2025-08-25
Payer: MEDICARE

## 2025-08-24 ENCOUNTER — APPOINTMENT (OUTPATIENT)
Dept: GENERAL RADIOLOGY | Age: 80
End: 2025-08-24
Payer: MEDICARE

## 2025-08-24 DIAGNOSIS — R93.5 ABNORMAL CT OF THE ABDOMEN: ICD-10-CM

## 2025-08-24 DIAGNOSIS — R42 LIGHT HEADEDNESS: Primary | ICD-10-CM

## 2025-08-24 DIAGNOSIS — I71.43 INFRARENAL ABDOMINAL AORTIC ANEURYSM (AAA) WITHOUT RUPTURE: ICD-10-CM

## 2025-08-24 DIAGNOSIS — I95.1 ORTHOSTATIC HYPOTENSION: ICD-10-CM

## 2025-08-24 LAB
ALBUMIN SERPL-MCNC: 3.8 G/DL (ref 3.5–5.2)
ALP SERPL-CCNC: 84 U/L (ref 35–104)
ALT SERPL-CCNC: 7 U/L (ref 0–35)
ANION GAP SERPL CALCULATED.3IONS-SCNC: 10 MMOL/L (ref 7–16)
AST SERPL-CCNC: 20 U/L (ref 0–35)
BASOPHILS # BLD: 0.11 K/UL (ref 0–0.2)
BASOPHILS NFR BLD: 1 % (ref 0–2)
BILIRUB SERPL-MCNC: 0.3 MG/DL (ref 0–1.2)
BUN SERPL-MCNC: 19 MG/DL (ref 8–23)
CALCIUM SERPL-MCNC: 9.4 MG/DL (ref 8.8–10.2)
CHLORIDE SERPL-SCNC: 107 MMOL/L (ref 98–107)
CO2 SERPL-SCNC: 25 MMOL/L (ref 22–29)
CREAT SERPL-MCNC: 0.7 MG/DL (ref 0.5–1)
EOSINOPHIL # BLD: 0.49 K/UL (ref 0.05–0.5)
EOSINOPHILS RELATIVE PERCENT: 5 % (ref 0–6)
ERYTHROCYTE [DISTWIDTH] IN BLOOD BY AUTOMATED COUNT: 14.8 % (ref 11.5–15)
GFR, ESTIMATED: 89 ML/MIN/1.73M2
GLUCOSE SERPL-MCNC: 92 MG/DL (ref 74–99)
HCT VFR BLD AUTO: 38.9 % (ref 34–48)
HGB BLD-MCNC: 12.2 G/DL (ref 11.5–15.5)
IMM GRANULOCYTES # BLD AUTO: 0.03 K/UL (ref 0–0.58)
IMM GRANULOCYTES NFR BLD: 0 % (ref 0–5)
LIPASE SERPL-CCNC: 12 U/L (ref 13–60)
LYMPHOCYTES NFR BLD: 3.33 K/UL (ref 1.5–4)
LYMPHOCYTES RELATIVE PERCENT: 36 % (ref 20–42)
MAGNESIUM SERPL-MCNC: 2.1 MG/DL (ref 1.6–2.4)
MCH RBC QN AUTO: 28.4 PG (ref 26–35)
MCHC RBC AUTO-ENTMCNC: 31.4 G/DL (ref 32–34.5)
MCV RBC AUTO: 90.5 FL (ref 80–99.9)
MONOCYTES NFR BLD: 0.57 K/UL (ref 0.1–0.95)
MONOCYTES NFR BLD: 6 % (ref 2–12)
NEUTROPHILS NFR BLD: 51 % (ref 43–80)
NEUTS SEG NFR BLD: 4.71 K/UL (ref 1.8–7.3)
PLATELET # BLD AUTO: 211 K/UL (ref 130–450)
PMV BLD AUTO: 10.1 FL (ref 7–12)
POTASSIUM SERPL-SCNC: 3.9 MMOL/L (ref 3.5–5.1)
PROT SERPL-MCNC: 6.7 G/DL (ref 6.4–8.3)
RBC # BLD AUTO: 4.3 M/UL (ref 3.5–5.5)
SODIUM SERPL-SCNC: 142 MMOL/L (ref 136–145)
TROPONIN I SERPL HS-MCNC: 15 NG/L (ref 0–14)
WBC OTHER # BLD: 9.2 K/UL (ref 4.5–11.5)

## 2025-08-24 PROCEDURE — 87086 URINE CULTURE/COLONY COUNT: CPT

## 2025-08-24 PROCEDURE — 99285 EMERGENCY DEPT VISIT HI MDM: CPT

## 2025-08-24 PROCEDURE — 96360 HYDRATION IV INFUSION INIT: CPT

## 2025-08-24 PROCEDURE — 2580000003 HC RX 258

## 2025-08-24 PROCEDURE — 84484 ASSAY OF TROPONIN QUANT: CPT

## 2025-08-24 PROCEDURE — 74177 CT ABD & PELVIS W/CONTRAST: CPT

## 2025-08-24 PROCEDURE — 87077 CULTURE AEROBIC IDENTIFY: CPT

## 2025-08-24 PROCEDURE — 83690 ASSAY OF LIPASE: CPT

## 2025-08-24 PROCEDURE — 85025 COMPLETE CBC W/AUTO DIFF WBC: CPT

## 2025-08-24 PROCEDURE — 70450 CT HEAD/BRAIN W/O DYE: CPT

## 2025-08-24 PROCEDURE — 81001 URINALYSIS AUTO W/SCOPE: CPT

## 2025-08-24 PROCEDURE — 6360000004 HC RX CONTRAST MEDICATION: Performed by: RADIOLOGY

## 2025-08-24 PROCEDURE — 80053 COMPREHEN METABOLIC PANEL: CPT

## 2025-08-24 PROCEDURE — 71046 X-RAY EXAM CHEST 2 VIEWS: CPT

## 2025-08-24 PROCEDURE — 93005 ELECTROCARDIOGRAM TRACING: CPT

## 2025-08-24 PROCEDURE — 83735 ASSAY OF MAGNESIUM: CPT

## 2025-08-24 RX ORDER — 0.9 % SODIUM CHLORIDE 0.9 %
1000 INTRAVENOUS SOLUTION INTRAVENOUS ONCE
Status: COMPLETED | OUTPATIENT
Start: 2025-08-24 | End: 2025-08-25

## 2025-08-24 RX ORDER — IOPAMIDOL 755 MG/ML
75 INJECTION, SOLUTION INTRAVASCULAR
Status: COMPLETED | OUTPATIENT
Start: 2025-08-24 | End: 2025-08-24

## 2025-08-24 RX ADMIN — IOPAMIDOL 75 ML: 755 INJECTION, SOLUTION INTRAVENOUS at 21:57

## 2025-08-24 RX ADMIN — SODIUM CHLORIDE 1000 ML: 0.9 INJECTION, SOLUTION INTRAVENOUS at 23:42

## 2025-08-25 VITALS
SYSTOLIC BLOOD PRESSURE: 150 MMHG | DIASTOLIC BLOOD PRESSURE: 73 MMHG | RESPIRATION RATE: 21 BRPM | HEART RATE: 68 BPM | OXYGEN SATURATION: 93 % | TEMPERATURE: 97.8 F

## 2025-08-25 LAB
BILIRUB UR QL STRIP: NEGATIVE
CLARITY UR: CLEAR
COLOR UR: YELLOW
EKG ATRIAL RATE: 57 BPM
EKG P AXIS: 77 DEGREES
EKG P-R INTERVAL: 162 MS
EKG Q-T INTERVAL: 446 MS
EKG QRS DURATION: 90 MS
EKG QTC CALCULATION (BAZETT): 434 MS
EKG R AXIS: 16 DEGREES
EKG T AXIS: 53 DEGREES
EKG VENTRICULAR RATE: 57 BPM
GLUCOSE UR STRIP-MCNC: NEGATIVE MG/DL
HGB UR QL STRIP.AUTO: ABNORMAL
KETONES UR STRIP-MCNC: NEGATIVE MG/DL
LEUKOCYTE ESTERASE UR QL STRIP: NEGATIVE
NITRITE UR QL STRIP: NEGATIVE
PH UR STRIP: 7 [PH] (ref 5–8)
PROT UR STRIP-MCNC: NEGATIVE MG/DL
RBC #/AREA URNS HPF: ABNORMAL /HPF
SP GR UR STRIP: 1.01 (ref 1–1.03)
TROPONIN I SERPL HS-MCNC: 15 NG/L (ref 0–14)
UROBILINOGEN UR STRIP-ACNC: 0.2 EU/DL (ref 0–1)
WBC #/AREA URNS HPF: ABNORMAL /HPF

## 2025-08-25 PROCEDURE — 97165 OT EVAL LOW COMPLEX 30 MIN: CPT

## 2025-08-25 PROCEDURE — 97161 PT EVAL LOW COMPLEX 20 MIN: CPT | Performed by: PHYSICAL THERAPIST

## 2025-08-25 PROCEDURE — 93010 ELECTROCARDIOGRAM REPORT: CPT | Performed by: INTERNAL MEDICINE

## 2025-08-25 PROCEDURE — 96361 HYDRATE IV INFUSION ADD-ON: CPT

## 2025-08-27 LAB
MICROORGANISM SPEC CULT: ABNORMAL
SERVICE CMNT-IMP: ABNORMAL
SPECIMEN DESCRIPTION: ABNORMAL

## 2026-01-13 ENCOUNTER — APPOINTMENT (OUTPATIENT)
Dept: PRIMARY CARE | Facility: CLINIC | Age: 81
End: 2026-01-13
Payer: COMMERCIAL

## (undated) DEVICE — GOWN,AURORA,BRTHSLV,2XL,18/CS: Brand: MEDLINE

## (undated) DEVICE — FRACTURE TABLE: Brand: MEDLINE INDUSTRIES, INC.

## (undated) DEVICE — GLOVE ORANGE PI 8   MSG9080

## (undated) DEVICE — GLOVE ORTHO 8   MSG9480

## (undated) DEVICE — DRESSING HYDROFIBER AQUACEL AG ADVANTAGE 3.5X6 IN

## (undated) DEVICE — BIT DRL L330MM DIA4.2MM CALIB L100MM ST 3 FLUT QUIK CPL FOR

## (undated) DEVICE — GUIDEWIRE ORTH L400MM DIA3.2MM FOR TFN